# Patient Record
Sex: FEMALE | Race: WHITE | Employment: OTHER | URBAN - NONMETROPOLITAN AREA
[De-identification: names, ages, dates, MRNs, and addresses within clinical notes are randomized per-mention and may not be internally consistent; named-entity substitution may affect disease eponyms.]

---

## 2017-01-25 ENCOUNTER — TELEPHONE (OUTPATIENT)
Dept: CARDIOLOGY | Age: 82
End: 2017-01-25

## 2017-01-25 RX ORDER — IRBESARTAN 300 MG/1
300 TABLET ORAL NIGHTLY
Qty: 30 TABLET | Refills: 5 | Status: SHIPPED | OUTPATIENT
Start: 2017-01-25 | End: 2017-11-22 | Stop reason: ALTCHOICE

## 2017-02-07 ENCOUNTER — TELEPHONE (OUTPATIENT)
Dept: CARDIOLOGY | Age: 82
End: 2017-02-07

## 2017-02-16 RX ORDER — HYDROCHLOROTHIAZIDE 25 MG/1
TABLET ORAL
Qty: 30 TABLET | Refills: 3 | Status: ON HOLD | OUTPATIENT
Start: 2017-02-16 | End: 2018-02-13 | Stop reason: HOSPADM

## 2017-03-02 RX ORDER — ATENOLOL 25 MG/1
TABLET ORAL
Qty: 90 TABLET | Refills: 5 | Status: SHIPPED | OUTPATIENT
Start: 2017-03-02 | End: 2017-06-14 | Stop reason: ALTCHOICE

## 2017-05-11 ENCOUNTER — TELEPHONE (OUTPATIENT)
Dept: CARDIOLOGY | Age: 82
End: 2017-05-11

## 2017-05-11 ENCOUNTER — OFFICE VISIT (OUTPATIENT)
Dept: CARDIOLOGY | Age: 82
End: 2017-05-11
Payer: MEDICARE

## 2017-05-11 VITALS
WEIGHT: 180 LBS | DIASTOLIC BLOOD PRESSURE: 80 MMHG | BODY MASS INDEX: 26.66 KG/M2 | SYSTOLIC BLOOD PRESSURE: 138 MMHG | HEART RATE: 62 BPM | HEIGHT: 69 IN

## 2017-05-11 DIAGNOSIS — R06.02 SOB (SHORTNESS OF BREATH): ICD-10-CM

## 2017-05-11 DIAGNOSIS — R06.02 SOB (SHORTNESS OF BREATH): Primary | ICD-10-CM

## 2017-05-11 DIAGNOSIS — R94.2 ABNORMAL PFT: ICD-10-CM

## 2017-05-11 DIAGNOSIS — I35.1 MILD AORTIC INSUFFICIENCY: ICD-10-CM

## 2017-05-11 DIAGNOSIS — I10 ESSENTIAL HYPERTENSION: Primary | ICD-10-CM

## 2017-05-11 PROCEDURE — 4040F PNEUMOC VAC/ADMIN/RCVD: CPT | Performed by: NURSE PRACTITIONER

## 2017-05-11 PROCEDURE — 99213 OFFICE O/P EST LOW 20 MIN: CPT | Performed by: NURSE PRACTITIONER

## 2017-05-11 PROCEDURE — G8427 DOCREV CUR MEDS BY ELIG CLIN: HCPCS | Performed by: NURSE PRACTITIONER

## 2017-05-11 PROCEDURE — 1036F TOBACCO NON-USER: CPT | Performed by: NURSE PRACTITIONER

## 2017-05-11 PROCEDURE — 1123F ACP DISCUSS/DSCN MKR DOCD: CPT | Performed by: NURSE PRACTITIONER

## 2017-05-11 PROCEDURE — G8420 CALC BMI NORM PARAMETERS: HCPCS | Performed by: NURSE PRACTITIONER

## 2017-05-11 PROCEDURE — 1090F PRES/ABSN URINE INCON ASSESS: CPT | Performed by: NURSE PRACTITIONER

## 2017-05-11 PROCEDURE — G8399 PT W/DXA RESULTS DOCUMENT: HCPCS | Performed by: NURSE PRACTITIONER

## 2017-05-15 ENCOUNTER — TELEPHONE (OUTPATIENT)
Dept: CARDIOLOGY | Age: 82
End: 2017-05-15

## 2017-05-30 ENCOUNTER — HOSPITAL ENCOUNTER (EMERGENCY)
Age: 82
Discharge: LEFT BEFORE DISCHARGE | End: 2017-05-30
Payer: MEDICARE

## 2017-05-30 VITALS
DIASTOLIC BLOOD PRESSURE: 84 MMHG | OXYGEN SATURATION: 99 % | RESPIRATION RATE: 18 BRPM | TEMPERATURE: 98.4 F | HEART RATE: 80 BPM | SYSTOLIC BLOOD PRESSURE: 142 MMHG

## 2017-05-30 PROCEDURE — 4500000002 HC ER NO CHARGE

## 2017-06-14 ENCOUNTER — OFFICE VISIT (OUTPATIENT)
Dept: CARDIOLOGY | Age: 82
End: 2017-06-14
Payer: MEDICARE

## 2017-06-14 VITALS
HEIGHT: 69 IN | HEART RATE: 54 BPM | DIASTOLIC BLOOD PRESSURE: 92 MMHG | WEIGHT: 180 LBS | SYSTOLIC BLOOD PRESSURE: 150 MMHG | BODY MASS INDEX: 26.66 KG/M2

## 2017-06-14 DIAGNOSIS — I10 ESSENTIAL HYPERTENSION: Primary | ICD-10-CM

## 2017-06-14 PROCEDURE — 1090F PRES/ABSN URINE INCON ASSESS: CPT | Performed by: NURSE PRACTITIONER

## 2017-06-14 PROCEDURE — G8399 PT W/DXA RESULTS DOCUMENT: HCPCS | Performed by: NURSE PRACTITIONER

## 2017-06-14 PROCEDURE — 99213 OFFICE O/P EST LOW 20 MIN: CPT | Performed by: NURSE PRACTITIONER

## 2017-06-14 PROCEDURE — 1036F TOBACCO NON-USER: CPT | Performed by: NURSE PRACTITIONER

## 2017-06-14 PROCEDURE — G8427 DOCREV CUR MEDS BY ELIG CLIN: HCPCS | Performed by: NURSE PRACTITIONER

## 2017-06-14 PROCEDURE — 4040F PNEUMOC VAC/ADMIN/RCVD: CPT | Performed by: NURSE PRACTITIONER

## 2017-06-14 PROCEDURE — 93000 ELECTROCARDIOGRAM COMPLETE: CPT | Performed by: NURSE PRACTITIONER

## 2017-06-14 PROCEDURE — G8419 CALC BMI OUT NRM PARAM NOF/U: HCPCS | Performed by: NURSE PRACTITIONER

## 2017-06-14 PROCEDURE — 1123F ACP DISCUSS/DSCN MKR DOCD: CPT | Performed by: NURSE PRACTITIONER

## 2017-06-14 RX ORDER — CLONIDINE HYDROCHLORIDE 0.1 MG/1
0.1 TABLET ORAL 3 TIMES DAILY PRN
COMMUNITY
End: 2017-11-22 | Stop reason: ALTCHOICE

## 2017-06-14 RX ORDER — NEBIVOLOL 10 MG/1
10 TABLET ORAL 2 TIMES DAILY
Qty: 60 TABLET | Refills: 0
Start: 2017-06-14 | End: 2017-06-28 | Stop reason: SDUPTHER

## 2017-06-28 RX ORDER — NEBIVOLOL 10 MG/1
10 TABLET ORAL 2 TIMES DAILY
Qty: 4 TABLET | Refills: 0 | Status: ON HOLD | OUTPATIENT
Start: 2017-06-28 | End: 2018-02-13 | Stop reason: HOSPADM

## 2017-06-30 ENCOUNTER — TELEPHONE (OUTPATIENT)
Dept: CARDIOLOGY | Age: 82
End: 2017-06-30

## 2017-11-22 ENCOUNTER — OFFICE VISIT (OUTPATIENT)
Dept: CARDIOLOGY | Age: 82
End: 2017-11-22
Payer: MEDICARE

## 2017-11-22 VITALS
DIASTOLIC BLOOD PRESSURE: 70 MMHG | HEIGHT: 69 IN | WEIGHT: 178 LBS | SYSTOLIC BLOOD PRESSURE: 138 MMHG | BODY MASS INDEX: 26.36 KG/M2

## 2017-11-22 DIAGNOSIS — I10 ESSENTIAL HYPERTENSION: Primary | ICD-10-CM

## 2017-11-22 PROCEDURE — G8417 CALC BMI ABV UP PARAM F/U: HCPCS | Performed by: NURSE PRACTITIONER

## 2017-11-22 PROCEDURE — 99213 OFFICE O/P EST LOW 20 MIN: CPT | Performed by: NURSE PRACTITIONER

## 2017-11-22 PROCEDURE — 1123F ACP DISCUSS/DSCN MKR DOCD: CPT | Performed by: NURSE PRACTITIONER

## 2017-11-22 PROCEDURE — 1036F TOBACCO NON-USER: CPT | Performed by: NURSE PRACTITIONER

## 2017-11-22 PROCEDURE — G8484 FLU IMMUNIZE NO ADMIN: HCPCS | Performed by: NURSE PRACTITIONER

## 2017-11-22 PROCEDURE — 4040F PNEUMOC VAC/ADMIN/RCVD: CPT | Performed by: NURSE PRACTITIONER

## 2017-11-22 PROCEDURE — 1090F PRES/ABSN URINE INCON ASSESS: CPT | Performed by: NURSE PRACTITIONER

## 2017-11-22 PROCEDURE — G8427 DOCREV CUR MEDS BY ELIG CLIN: HCPCS | Performed by: NURSE PRACTITIONER

## 2017-11-22 PROCEDURE — G8399 PT W/DXA RESULTS DOCUMENT: HCPCS | Performed by: NURSE PRACTITIONER

## 2017-11-22 RX ORDER — AMLODIPINE BESYLATE 5 MG/1
5 TABLET ORAL DAILY
COMMUNITY
End: 2019-05-06 | Stop reason: DRUGHIGH

## 2017-11-22 NOTE — PROGRESS NOTES
[facility], [place]. [address], [address], [address] [facility]  [phone] office  [phone] fax      OFFICE VISIT:  11/22/2017    [address]: 3/26/1933    Reason For Visit:  Elaine Courtney is a 80 y.o. female who is here for 6 Month Follow-Up (Patient is here for a 6 month follow up, and recheck on Bystolic after starting on 06/14/2017) and Hypertension    The patient presents today for cardiology follow up. Overall, the patient is doing well from a cardiac standpoint without symptoms to suggest myocardial ischemia. BP is well controlled on current regimen. The patient's PCP monitors cholesterol. Francisca Wade denies exertional chest pain, shortness of breath, orthopnea, paroxysmal nocturnal dyspnea, syncope, presyncope, sustained arrythmia, edema and fatigue. The patient denies numbness or weakness to suggest cerebrovascular accident or transient ischemic attack.       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    Brain tumor ([address] D49.6    Irregular heart rate I49.9    Moderate mitral regurgitation I34.0    SOB (shortness of breath) R06.02    Pedal edema R60.0    Abnormal PFT R94.2    Essential hypertension I10    Mild aortic insufficiency I35.1     Past Medical History:   Diagnosis Date    Arthralgia     Brain tumor ([address]     meningioma removed 2009    COPD (chronic obstructive pulmonary disease) ([address]     Diverticulosis     Essential hypertension     Gout     Mild aortic regurgitation 01/04/2016    Moderate mitral regurgitation 3/13/2014    Polyarticular arthritis      Past Surgical History:   Procedure Laterality Date    BREAST BIOPSY  1992&2004    CATARACT REMOVAL      CHOLECYSTECTOMY  1996    CRANIOTOMY  2010    HYSTERECTOMY  1971    TUMOR REMOVAL  2009    brain tumor (meningioma)     Family History Problem Relation Age of Onset    Coronary Art Dis Father     Heart Attack Father     Cancer Sister     Cancer Brother     Heart Attack Mother     Stroke Mother      Social History   Substance Use Topics    Smoking status: Former Smoker     Packs/day: 1.00     Years: 8.00    Smokeless tobacco: Never Used      Comment: Quit 40+ years ago.  Alcohol use No      Comment: occasional      Current Outpatient Prescriptions   Medication Sig Dispense Refill    amLODIPine (NORVASC) 5 MG tablet Take 5 mg by mouth daily      nebivolol (BYSTOLIC) 10 MG tablet Take 1 tablet by mouth 2 times daily 4 tablet 0    hydrochlorothiazide (HYDRODIURIL) 25 MG tablet TAKE 1 TABLET BY MOUTH ONCE DAILY. 30 tablet 3    aspirin 81 MG tablet Take 81 mg by mouth daily      allopurinol (ZYLOPRIM) 100 MG tablet Take 100 mg by mouth daily. No current facility-administered medications for this visit. Allergies: Codeine and Sulfa antibiotics    Review of Systems  Constitutional  no appetite change, or unexpected weight change. No fever, chills or diaphoresis. No significant change in activity level or new onset of fatigue. HEENT  no significant rhinorrhea or epistaxis. No tinnitus or significant hearing loss. Eyes  no sudden vision change or amaurosis. No corneal arcus, xantholasma, subconjunctival hemorrhage or discharge. Respiratory  no significant wheezing, stridor, apnea or cough. No dyspnea on exertion or shortness of air. Cardiovascular  no exertional chest pain to suggest myocardial ischemia. No orthopnea or PND. No sensation of sustained arrythmia. No occurrence of slow heart rate. No palpitations. No claudication. No leg edema. Gastrointestinal  no abdominal swelling or pain. No blood in stool. No severe constipation, diarrhea, nausea, or vomiting. Genitourinary  no dysuria, frequency, or urgency. No flank pain or hematuria. Musculoskeletal  no back pain or myalgia.   No problems with

## 2017-11-22 NOTE — PATIENT INSTRUCTIONS
Continue current medications as prescribed. Continue to follow up with primary care provider for non cardiac medical problems. Call the office with any problems, questions or concerns at [phone]. Follow up as scheduled with your cardiologist.  The following educational material has been included in this after visit summary for your review: hypertension.     Additional instructions:  Coronary artery disease risk factors you can control: Smoking, high blood pressure, high cholesterol, diabetes, being overweight, lack of exercise and stress. Continue heart healthy diet. Take medications as directed. Exercise as tolerated. Strive for 15 minutes of exercise most days of the week. If asked to keep a blood pressure log, do so for 2 weeks. Call the office to report readings at [phone]. Blood pressure goal is 140/90 or less. If you are a diabetic, the goal is 130/80 or less. If you are taking cholesterol lowering medications, it is recommended that lab work be checked annually. Always keep a current medication list. Bring your medications to every office visit.        Patient Education        Learning About High Blood Pressure  What is high blood pressure? Blood pressure is a measure of how hard the blood pushes against the walls of your arteries. It's normal for blood pressure to go up and down throughout the day, but if it stays up, you have high blood pressure. Another name for high blood pressure is hypertension. Two numbers tell you your blood pressure. The first number is the systolic pressure. It shows how hard the blood pushes when your heart is pumping. The second number is the diastolic pressure. It shows how hard the blood pushes between heartbeats, when your heart is relaxed and filling with blood. A blood pressure of less than 120/80 (say \"120 over 80\") is ideal for an adult. High blood pressure is 140/90 or higher.  You have high blood pressure if your top number is 140 or higher or products. Eat less saturated and total fats. How is high blood pressure treated? · Your doctor will suggest making lifestyle changes. For example, your doctor may ask you to eat healthy foods, quit smoking, lose extra weight, and be more active. · If lifestyle changes don't help enough or your blood pressure is very high, you will have to take medicine every day. Follow-up care is a key part of your treatment and safety. Be sure to make and go to all appointments, and call your doctor if you are having problems. It's also a good idea to know your test results and keep a list of the medicines you take. Where can you learn more? Go to [email or web address] org and sign in to your [email or web address] account. Enter P501 in the [employer] box to learn more about \"Learning About High Blood Pressure. \"     If you do not have an account, please click on the \"Sign Up Now\" link. Current as of: March 23, 2016  Content Version: 11.3  © 0162-0473 [employer]. Care instructions adapted under license by [facility] ([facility]). If you have questions about a medical condition or this instruction, always ask your healthcare professional. [place] [address] any warranty or liability for your use of this information.

## 2018-02-10 ENCOUNTER — APPOINTMENT (OUTPATIENT)
Dept: GENERAL RADIOLOGY | Age: 83
DRG: 065 | End: 2018-02-10
Payer: MEDICARE

## 2018-02-10 ENCOUNTER — HOSPITAL ENCOUNTER (INPATIENT)
Age: 83
LOS: 3 days | Discharge: HOME | DRG: 065 | End: 2018-02-13
Attending: FAMILY MEDICINE | Admitting: INTERNAL MEDICINE
Payer: MEDICARE

## 2018-02-10 ENCOUNTER — APPOINTMENT (OUTPATIENT)
Dept: CT IMAGING | Age: 83
DRG: 065 | End: 2018-02-10
Payer: MEDICARE

## 2018-02-10 DIAGNOSIS — I63.9 STROKE WITH CEREBRAL ISCHEMIA (HCC): ICD-10-CM

## 2018-02-10 DIAGNOSIS — I10 ESSENTIAL HYPERTENSION: ICD-10-CM

## 2018-02-10 DIAGNOSIS — I63.9 CEREBROVASCULAR ACCIDENT (CVA), UNSPECIFIED MECHANISM (HCC): Primary | ICD-10-CM

## 2018-02-10 PROBLEM — I69.320 APHASIA DUE TO RECENT CEREBROVASCULAR ACCIDENT (CVA): Status: ACTIVE | Noted: 2018-02-10

## 2018-02-10 LAB
ALBUMIN SERPL-MCNC: 4.5 G/DL (ref 3.5–5.2)
ALP BLD-CCNC: 87 U/L (ref 35–104)
ALT SERPL-CCNC: 17 U/L (ref 5–33)
ANION GAP SERPL CALCULATED.3IONS-SCNC: 15 MMOL/L (ref 7–19)
AST SERPL-CCNC: 21 U/L (ref 5–32)
BACTERIA: ABNORMAL /HPF
BASOPHILS ABSOLUTE: 0.1 K/UL (ref 0–0.2)
BASOPHILS RELATIVE PERCENT: 0.7 % (ref 0–1)
BILIRUB SERPL-MCNC: 0.5 MG/DL (ref 0.2–1.2)
BILIRUBIN URINE: NEGATIVE
BLOOD, URINE: NEGATIVE
BUN BLDV-MCNC: 28 MG/DL (ref 8–23)
C-REACTIVE PROTEIN: 0.21 MG/DL (ref 0–0.5)
CALCIUM SERPL-MCNC: 9.3 MG/DL (ref 8.8–10.2)
CHLORIDE BLD-SCNC: 99 MMOL/L (ref 98–111)
CLARITY: ABNORMAL
CO2: 25 MMOL/L (ref 22–29)
COLOR: YELLOW
CREAT SERPL-MCNC: 0.9 MG/DL (ref 0.5–0.9)
EOSINOPHILS ABSOLUTE: 0.1 K/UL (ref 0–0.6)
EOSINOPHILS RELATIVE PERCENT: 1.2 % (ref 0–5)
EPITHELIAL CELLS, UA: 9 /HPF (ref 0–5)
GFR NON-AFRICAN AMERICAN: 60
GLUCOSE BLD-MCNC: 120 MG/DL (ref 74–109)
GLUCOSE URINE: NEGATIVE MG/DL
HCT VFR BLD CALC: 43.7 % (ref 37–47)
HEMOGLOBIN: 14.6 G/DL (ref 12–16)
HYALINE CASTS: 2 /HPF (ref 0–8)
KETONES, URINE: NEGATIVE MG/DL
LEUKOCYTE ESTERASE, URINE: ABNORMAL
LYMPHOCYTES ABSOLUTE: 1.1 K/UL (ref 1.1–4.5)
LYMPHOCYTES RELATIVE PERCENT: 14.9 % (ref 20–40)
MCH RBC QN AUTO: 30.8 PG (ref 27–31)
MCHC RBC AUTO-ENTMCNC: 33.4 G/DL (ref 33–37)
MCV RBC AUTO: 92.2 FL (ref 81–99)
MONOCYTES ABSOLUTE: 0.6 K/UL (ref 0–0.9)
MONOCYTES RELATIVE PERCENT: 7.7 % (ref 0–10)
NEUTROPHILS ABSOLUTE: 5.6 K/UL (ref 1.5–7.5)
NEUTROPHILS RELATIVE PERCENT: 75.1 % (ref 50–65)
NITRITE, URINE: POSITIVE
PDW BLD-RTO: 12.7 % (ref 11.5–14.5)
PERFORMED ON: NORMAL
PH UA: 5.5
PLATELET # BLD: 154 K/UL (ref 130–400)
PMV BLD AUTO: 9 FL (ref 9.4–12.3)
POC TROPONIN I: 0 NG/ML (ref 0–0.08)
POTASSIUM SERPL-SCNC: 3.9 MMOL/L (ref 3.5–5)
PRO-BNP: 571 PG/ML (ref 0–1800)
PROTEIN UA: NEGATIVE MG/DL
RBC # BLD: 4.74 M/UL (ref 4.2–5.4)
RBC UA: 3 /HPF (ref 0–4)
SODIUM BLD-SCNC: 139 MMOL/L (ref 136–145)
SPECIFIC GRAVITY UA: 1.01
TOTAL CK: 107 U/L (ref 26–192)
TOTAL PROTEIN: 7.3 G/DL (ref 6.6–8.7)
UROBILINOGEN, URINE: 0.2 E.U./DL
WBC # BLD: 7.5 K/UL (ref 4.8–10.8)
WBC UA: 9 /HPF (ref 0–5)

## 2018-02-10 PROCEDURE — 85025 COMPLETE CBC W/AUTO DIFF WBC: CPT

## 2018-02-10 PROCEDURE — 99285 EMERGENCY DEPT VISIT HI MDM: CPT

## 2018-02-10 PROCEDURE — 71045 X-RAY EXAM CHEST 1 VIEW: CPT

## 2018-02-10 PROCEDURE — 80053 COMPREHEN METABOLIC PANEL: CPT

## 2018-02-10 PROCEDURE — 2580000003 HC RX 258: Performed by: FAMILY MEDICINE

## 2018-02-10 PROCEDURE — 81001 URINALYSIS AUTO W/SCOPE: CPT

## 2018-02-10 PROCEDURE — 36415 COLL VENOUS BLD VENIPUNCTURE: CPT

## 2018-02-10 PROCEDURE — 70450 CT HEAD/BRAIN W/O DYE: CPT

## 2018-02-10 PROCEDURE — 93005 ELECTROCARDIOGRAM TRACING: CPT

## 2018-02-10 PROCEDURE — 83880 ASSAY OF NATRIURETIC PEPTIDE: CPT

## 2018-02-10 PROCEDURE — 1210000000 HC MED SURG R&B

## 2018-02-10 PROCEDURE — 86140 C-REACTIVE PROTEIN: CPT

## 2018-02-10 PROCEDURE — 84484 ASSAY OF TROPONIN QUANT: CPT

## 2018-02-10 PROCEDURE — 82550 ASSAY OF CK (CPK): CPT

## 2018-02-10 PROCEDURE — 6370000000 HC RX 637 (ALT 250 FOR IP): Performed by: FAMILY MEDICINE

## 2018-02-10 PROCEDURE — 99285 EMERGENCY DEPT VISIT HI MDM: CPT | Performed by: FAMILY MEDICINE

## 2018-02-10 RX ORDER — METOPROLOL TARTRATE 5 MG/5ML
5 INJECTION INTRAVENOUS ONCE
Status: DISCONTINUED | OUTPATIENT
Start: 2018-02-10 | End: 2018-02-13 | Stop reason: HOSPADM

## 2018-02-10 RX ORDER — AMLODIPINE BESYLATE 5 MG/1
5 TABLET ORAL ONCE
Status: COMPLETED | OUTPATIENT
Start: 2018-02-10 | End: 2018-02-10

## 2018-02-10 RX ORDER — SODIUM CHLORIDE 9 MG/ML
INJECTION, SOLUTION INTRAVENOUS CONTINUOUS
Status: DISCONTINUED | OUTPATIENT
Start: 2018-02-10 | End: 2018-02-11 | Stop reason: SDUPTHER

## 2018-02-10 RX ADMIN — SODIUM CHLORIDE: 9 INJECTION, SOLUTION INTRAVENOUS at 21:25

## 2018-02-10 RX ADMIN — AMLODIPINE BESYLATE 5 MG: 5 TABLET ORAL at 22:04

## 2018-02-10 ASSESSMENT — ENCOUNTER SYMPTOMS
SINUS PAIN: 0
SHORTNESS OF BREATH: 0
WHEEZING: 0
NAUSEA: 0
COUGH: 0
DIARRHEA: 0
VOMITING: 0
COLOR CHANGE: 0
CHEST TIGHTNESS: 0
ABDOMINAL PAIN: 0
BACK PAIN: 0
RHINORRHEA: 0
CONSTIPATION: 0

## 2018-02-11 ENCOUNTER — APPOINTMENT (OUTPATIENT)
Dept: MRI IMAGING | Age: 83
DRG: 065 | End: 2018-02-11
Payer: MEDICARE

## 2018-02-11 PROBLEM — N30.00 ACUTE CYSTITIS WITHOUT HEMATURIA: Status: ACTIVE | Noted: 2018-02-11

## 2018-02-11 PROBLEM — J44.9 COPD (CHRONIC OBSTRUCTIVE PULMONARY DISEASE) (HCC): Status: ACTIVE | Noted: 2018-02-11

## 2018-02-11 LAB
CHOLESTEROL, TOTAL: 201 MG/DL (ref 160–199)
HBA1C MFR BLD: 5.3 %
HCT VFR BLD CALC: 38.2 % (ref 37–47)
HDLC SERPL-MCNC: 49 MG/DL (ref 65–121)
HEMOGLOBIN: 12.7 G/DL (ref 12–16)
LDL CHOLESTEROL CALCULATED: 131 MG/DL
LV EF: 58 %
LVEF MODALITY: NORMAL
MCH RBC QN AUTO: 30.7 PG (ref 27–31)
MCHC RBC AUTO-ENTMCNC: 33.2 G/DL (ref 33–37)
MCV RBC AUTO: 92.3 FL (ref 81–99)
PDW BLD-RTO: 12.8 % (ref 11.5–14.5)
PLATELET # BLD: 140 K/UL (ref 130–400)
PMV BLD AUTO: 8.9 FL (ref 9.4–12.3)
RBC # BLD: 4.14 M/UL (ref 4.2–5.4)
TRIGL SERPL-MCNC: 105 MG/DL (ref 0–149)
TROPONIN: <0.01 NG/ML (ref 0–0.03)
TROPONIN: <0.01 NG/ML (ref 0–0.03)
WBC # BLD: 7.3 K/UL (ref 4.8–10.8)

## 2018-02-11 PROCEDURE — G8979 MOBILITY GOAL STATUS: HCPCS

## 2018-02-11 PROCEDURE — 36415 COLL VENOUS BLD VENIPUNCTURE: CPT

## 2018-02-11 PROCEDURE — 6360000004 HC RX CONTRAST MEDICATION: Performed by: INTERNAL MEDICINE

## 2018-02-11 PROCEDURE — 84484 ASSAY OF TROPONIN QUANT: CPT

## 2018-02-11 PROCEDURE — 1210000000 HC MED SURG R&B

## 2018-02-11 PROCEDURE — 99222 1ST HOSP IP/OBS MODERATE 55: CPT | Performed by: INTERNAL MEDICINE

## 2018-02-11 PROCEDURE — 6360000002 HC RX W HCPCS: Performed by: INTERNAL MEDICINE

## 2018-02-11 PROCEDURE — 99222 1ST HOSP IP/OBS MODERATE 55: CPT | Performed by: PSYCHIATRY & NEUROLOGY

## 2018-02-11 PROCEDURE — 2580000003 HC RX 258: Performed by: INTERNAL MEDICINE

## 2018-02-11 PROCEDURE — 97162 PT EVAL MOD COMPLEX 30 MIN: CPT

## 2018-02-11 PROCEDURE — 6370000000 HC RX 637 (ALT 250 FOR IP): Performed by: INTERNAL MEDICINE

## 2018-02-11 PROCEDURE — 93880 EXTRACRANIAL BILAT STUDY: CPT

## 2018-02-11 PROCEDURE — A9577 INJ MULTIHANCE: HCPCS | Performed by: INTERNAL MEDICINE

## 2018-02-11 PROCEDURE — 80061 LIPID PANEL: CPT

## 2018-02-11 PROCEDURE — 97750 PHYSICAL PERFORMANCE TEST: CPT

## 2018-02-11 PROCEDURE — 83036 HEMOGLOBIN GLYCOSYLATED A1C: CPT

## 2018-02-11 PROCEDURE — 97116 GAIT TRAINING THERAPY: CPT

## 2018-02-11 PROCEDURE — 93306 TTE W/DOPPLER COMPLETE: CPT

## 2018-02-11 PROCEDURE — 70553 MRI BRAIN STEM W/O & W/DYE: CPT

## 2018-02-11 PROCEDURE — G8978 MOBILITY CURRENT STATUS: HCPCS

## 2018-02-11 PROCEDURE — 85027 COMPLETE CBC AUTOMATED: CPT

## 2018-02-11 RX ORDER — BISACODYL 10 MG
10 SUPPOSITORY, RECTAL RECTAL DAILY PRN
Status: DISCONTINUED | OUTPATIENT
Start: 2018-02-11 | End: 2018-02-13 | Stop reason: HOSPADM

## 2018-02-11 RX ORDER — SODIUM CHLORIDE 9 MG/ML
INJECTION, SOLUTION INTRAVENOUS CONTINUOUS
Status: DISCONTINUED | OUTPATIENT
Start: 2018-02-11 | End: 2018-02-13 | Stop reason: HOSPADM

## 2018-02-11 RX ORDER — ACETAMINOPHEN 325 MG/1
650 TABLET ORAL EVERY 4 HOURS PRN
Status: DISCONTINUED | OUTPATIENT
Start: 2018-02-11 | End: 2018-02-13 | Stop reason: HOSPADM

## 2018-02-11 RX ORDER — ONDANSETRON 2 MG/ML
4 INJECTION INTRAMUSCULAR; INTRAVENOUS EVERY 6 HOURS PRN
Status: DISCONTINUED | OUTPATIENT
Start: 2018-02-11 | End: 2018-02-13 | Stop reason: HOSPADM

## 2018-02-11 RX ORDER — DOCUSATE SODIUM 100 MG/1
100 CAPSULE, LIQUID FILLED ORAL 2 TIMES DAILY
Status: DISCONTINUED | OUTPATIENT
Start: 2018-02-11 | End: 2018-02-13 | Stop reason: HOSPADM

## 2018-02-11 RX ORDER — ASPIRIN 325 MG
325 TABLET ORAL DAILY
Status: DISCONTINUED | OUTPATIENT
Start: 2018-02-11 | End: 2018-02-13 | Stop reason: HOSPADM

## 2018-02-11 RX ORDER — SODIUM CHLORIDE 0.9 % (FLUSH) 0.9 %
10 SYRINGE (ML) INJECTION PRN
Status: DISCONTINUED | OUTPATIENT
Start: 2018-02-11 | End: 2018-02-13 | Stop reason: HOSPADM

## 2018-02-11 RX ORDER — ALLOPURINOL 100 MG/1
100 TABLET ORAL DAILY
Status: DISCONTINUED | OUTPATIENT
Start: 2018-02-11 | End: 2018-02-13 | Stop reason: HOSPADM

## 2018-02-11 RX ORDER — ATORVASTATIN CALCIUM 40 MG/1
40 TABLET, FILM COATED ORAL NIGHTLY
Status: DISCONTINUED | OUTPATIENT
Start: 2018-02-11 | End: 2018-02-13 | Stop reason: HOSPADM

## 2018-02-11 RX ORDER — SODIUM CHLORIDE 0.9 % (FLUSH) 0.9 %
10 SYRINGE (ML) INJECTION EVERY 12 HOURS SCHEDULED
Status: DISCONTINUED | OUTPATIENT
Start: 2018-02-11 | End: 2018-02-13 | Stop reason: HOSPADM

## 2018-02-11 RX ADMIN — Medication 1 G: at 03:26

## 2018-02-11 RX ADMIN — ENOXAPARIN SODIUM 40 MG: 40 INJECTION SUBCUTANEOUS at 09:49

## 2018-02-11 RX ADMIN — ATORVASTATIN CALCIUM 40 MG: 40 TABLET, FILM COATED ORAL at 03:28

## 2018-02-11 RX ADMIN — DOCUSATE SODIUM 100 MG: 100 CAPSULE, LIQUID FILLED ORAL at 20:13

## 2018-02-11 RX ADMIN — DOCUSATE SODIUM 100 MG: 100 CAPSULE, LIQUID FILLED ORAL at 03:29

## 2018-02-11 RX ADMIN — ALLOPURINOL 100 MG: 100 TABLET ORAL at 09:49

## 2018-02-11 RX ADMIN — SODIUM CHLORIDE: 9 INJECTION, SOLUTION INTRAVENOUS at 01:03

## 2018-02-11 RX ADMIN — Medication 10 ML: at 09:49

## 2018-02-11 RX ADMIN — ATORVASTATIN CALCIUM 40 MG: 40 TABLET, FILM COATED ORAL at 20:13

## 2018-02-11 RX ADMIN — Medication 10 ML: at 20:13

## 2018-02-11 RX ADMIN — GADOBENATE DIMEGLUMINE 17 ML: 529 INJECTION, SOLUTION INTRAVENOUS at 12:32

## 2018-02-11 RX ADMIN — DOCUSATE SODIUM 100 MG: 100 CAPSULE, LIQUID FILLED ORAL at 09:49

## 2018-02-11 RX ADMIN — ASPIRIN 325 MG ORAL TABLET 325 MG: 325 PILL ORAL at 09:48

## 2018-02-11 NOTE — ED PROVIDER NOTES
PM              LABS:  Labs Reviewed   COMPREHENSIVE METABOLIC PANEL - Abnormal; Notable for the following:        Result Value    Glucose 120 (*)     BUN 28 (*)     GFR Non- 60 (*)     All other components within normal limits   URINALYSIS - Abnormal; Notable for the following:     Clarity, UA CLOUDY (*)     Nitrite, Urine POSITIVE (*)     Leukocyte Esterase, Urine SMALL (*)     All other components within normal limits   CBC WITH AUTO DIFFERENTIAL - Abnormal; Notable for the following:     MPV 9.0 (*)     Neutrophils % 75.1 (*)     Lymphocytes % 14.9 (*)     All other components within normal limits   MICROSCOPIC URINALYSIS - Abnormal; Notable for the following:     WBC, UA 9 (*)     All other components within normal limits   BRAIN NATRIURETIC PEPTIDE   CK   C-REACTIVE PROTEIN   POCT VENOUS       All other labs were within normal range or not returned as of this dictation. EMERGENCY DEPARTMENT COURSE and DIFFERENTIAL DIAGNOSIS/MDM:   Vitals:    Vitals:    02/10/18 2130 02/10/18 2202 02/10/18 2204 02/10/18 2232   BP: (!) 179/80 (!) 176/74 (!) 176/74 (!) 160/86   Pulse: 67 68  65   Resp:       Temp:       TempSrc:       SpO2: 92% 93%  96%   Weight:           MDM    Reassessment      CONSULTS:  IP CONSULT TO NEUROLOGY    PROCEDURES:  Unless otherwise noted below, none     Procedures    FINAL IMPRESSION      1. Cerebrovascular accident (CVA), unspecified mechanism ([address]    2.  Essential hypertension          DISPOSITION/PLAN   DISPOSITION Admitted 02/10/2018 10:39:00 PM      PATIENT REFERRED TO:  Dulce Holland MD  [address] [phone]            DISCHARGE MEDICATIONS:  New Prescriptions    No medications on file          (Please note that portions of this note were completed with a voice recognition program.  Efforts were made to edit the dictations but occasionally words are mis-transcribed.)    Shanelle Quispe MD (electronically signed)  Attending Emergency Physician        Phuc Babb MD  02/10/18 9855

## 2018-02-11 NOTE — ED NOTES
Pt daughters state that pt has been complaining for the past week about sharp pains in her head intermittently and that pt has hx of brain surgery.       Fco Sanches RN  02/10/18 2022

## 2018-02-11 NOTE — PROGRESS NOTES
Alone  Type of Home: House  Home Layout: One level  Home Access: Stairs to enter without rails  Entrance Stairs - Number of Steps: 2  Home Equipment:  (none)  Receives Help From: Family  ADL Assistance: Independent (does bathing and dressing indep)  Homemaking Assistance: Independent (does all her own cooking and cleaning)  Homemaking Responsibilities: Yes  Ambulation Assistance: Independent  Transfer Assistance: Independent  Occupation: Retired  Additional Comments: pt. with fall at home  Objective     Observation/Palpation  Posture: Fair  Observation: pt. with R sided lean    AROM RLE (degrees)  RLE AROM: WFL  AROM LLE (degrees)  LLE AROM : WFL  Strength RLE  Comment: grossly 3+/5  Strength LLE  Strength LLE: WFL  Comment: grossly 4+/5        Bed mobility  Supine to Sit: Contact guard assistance  Sit to Supine: Contact guard assistance  Comment: pt. sat on EOB x 5 mins prior to standing  Transfers  Sit to Stand: Minimal Assistance (R sided lean)  Stand to sit: Minimal Assistance  Ambulation  Ambulation?: Yes  WB Status: no restrictions  Ambulation 1  Surface: level tile  Device: Hand-Held Assist  Assistance: Minimal assistance  Quality of Gait: unsteady, narrow RED  Distance: 40'  Comments: pt. with R sided lean/LOB, holding onto railing in hallway on L, R knee buckling occasionally     Balance  Posture: Fair  Sitting - Static: Good;+  Sitting - Dynamic: Good  Standing - Static: Fair;+  Standing - Dynamic: Fair  Exercises  Comments: AP x 20, LAQ x 10 EOB     Assessment   Body structures, Functions, Activity limitations: Decreased functional mobility ; Decreased strength;Decreased safe awareness;Decreased cognition;Decreased endurance;Decreased balance;Decreased coordination  Assessment: Pt. will beenfit from skilled PT to decrease impairments. Pt. a fall risk with to decreased strength RLE, balance and safety awareness and should not attempt mobility on her own at this time. Pt. would benefit from use of RW.

## 2018-02-11 NOTE — ED NOTES
Jacobo Martins at [phone].     Dr. Renny Paniagua called back at [place] [id number]  02/10/18 6680

## 2018-02-11 NOTE — CONSULTS
Consult to Neurology  Consult performed by: [facility]  Consult ordered by: Gurpreet Cloud Neurology Consult      Patient:   Tanisha Arroyo  MR#:    [id number]  Account Number:                   [id number]      Room:    [address] [place] [address]   YOB: 1933  Date of Progress Note: 2/11/2018  Time of Note                           10:53 AM  Attending Physician:  Leopold Griffes, MD  Consulting Physician:  Justa Posadas D.O.       CHIEF COMPLAINT:  Speech difficulty, facial drooping    HISTORY OF PRESENT ILLNESS:   This is a 80 y.o. female who was admitted with worsening speech difficulty and left facial drooping. Time of symptom onset could not be readily established and she was not felt to be a thrombotic candidate emergency department. She has a prior history of a meningioma and status post craniotomy. She has no previous stroke history. No history of atrial fibrillation or carotid disease. She is noted difficulty with her speech. Some questionable dysarthria. Son noted left facial drooping. They deny any focal weakness in the upper or lower extremities. She currently denies headaches or visual changes. CT negative for anything acute. Postop changes were noted. Basic labs were largely unrevealing outside of an underlying urinary tract infection.     REVIEW OF SYSTEMS:  Limited given speech difficulty    PAST MEDICAL HISTORY:      Diagnosis Date    Arthralgia     Brain tumor [facility])     meningioma removed 2009    [facility] ([facility] disease) [address]     Diverticulosis     Essential hypertension     Gout     Mild aortic regurgitation 01/04/2016    Moderate mitral regurgitation 3/13/2014    Polyarticular arthritis        PAST SURGICAL HISTORY:      Procedure Laterality Date    BREAST BIOPSY  1992&2004    CATARACT REMOVAL      CHOLECYSTECTOMY  1996    CRANIOTOMY  2010    HYSTERECTOMY  1971    TUMOR REMOVAL  2009    brain tumor (meningioma)       SOCIAL HISTORY: TOBACCO:   reports that she has quit smoking. She has a 8.00 pack-year smoking history. She has never used smokeless tobacco.  ETOH:   reports that she does not drink alcohol.   DRUG:    History   Drug Use No       FAMILY HISTORY:       Problem Relation Age of Onset    Coronary Art Dis Father     Heart Attack Father     Cancer Sister     Cancer Brother     Heart Attack Mother     Stroke Mother        MEDICATIONS:      Current Facility-Administered Medications:     0.9 % sodium chloride infusion, , Intravenous, Continuous, Navneet De La Torre MD, Last Rate: 75 mL/hr at 02/11/18 0103    sodium chloride flush 0.9 % injection 10 mL, 10 mL, Intravenous, 2 times per day, Navneet De La Torre MD, 10 mL at 02/11/18 0949    sodium chloride flush 0.9 % injection 10 mL, 10 mL, Intravenous, PRN, Navneet De La Torre MD    acetaminophen (TYLENOL) tablet 650 mg, 650 mg, Oral, Q4H PRN, Melissa Crouch MD    docusate sodium (COLACE) capsule 100 mg, 100 mg, Oral, BID, Navneet De La Torre MD, 100 mg at 02/11/18 0949    bisacodyl (DULCOLAX) suppository 10 mg, 10 mg, Rectal, Daily PRN, Navneet De La Torre MD    ondansetron (ZOFRAN) injection 4 mg, 4 mg, Intravenous, Q6H PRN, Navneet De La Torre MD    enoxaparin (LOVENOX) injection 40 mg, 40 mg, Subcutaneous, Daily, Vergie Prudegildardo Crouch MD, 40 mg at 02/11/18 0949    aspirin tablet 325 mg, 325 mg, Oral, Daily, Navneet De La Torre MD, 325 mg at 02/11/18 0948    atorvastatin (LIPITOR) tablet 40 mg, 40 mg, Oral, Nightly, Navneet De La Torre MD, 40 mg at 02/11/18 0328    cefTRIAXone (ROCEPHIN) 1 g in sodium chloride (PF) 10 mL IV syringe, 1 g, Intravenous, Q24H, Navneet De La Torre MD, Last Rate: 0 mL/hr at 02/11/18 0326, 1 g at 02/11/18 0326    allopurinol (ZYLOPRIM) tablet 100 mg, 100 mg, Oral, Daily, Bay FANTASMA Crouch MD, 100 mg at 02/11/18 0949    metoprolol (LOPRESSOR) injection 5 mg, 5 mg, Intravenous, Once, Colby Oliva MD    ALLERGIES:    Codeine and Sulfa antibiotics    PHYSICAL EXAM:    Vitals: 2/10/2018  CT BRAIN without contrast 2/10/2018 7:45 PM HISTORY: Stroke COMPARISON: December 26, 2015 DLP: 941 mGy cm TECHNIQUE: Serial axial tomographic images of the brain were obtained without the use of intravenous contrast. FINDINGS: The midline structures are nondisplaced. There is mild cerebral and cerebellar volume loss. Postsurgical change from right frontal craniotomy and right frontal encephalomalacia is noted. This is stable and unchanged . The basilar cisterns are normal in size and configuration. There is no evidence of intracranial hemorrhage or mass-effect. There is low attenuation in the periventricular white matter, consistent with chronic ischemic change. There are no abnormal extra-axial fluid collections. There is no evidence of tonsillar herniation. The included orbits and their contents are unremarkable. The visualized paranasal sinuses, mastoid air cells and middle ear cavities are clear. Corene Loupe Postsurgical change from right frontal craniotomy. 2. Changes of aging. 3. No acute intracranial abnormality. Signed by Dr Naya Jama on 2/10/2018 8:54 PM    Xr Chest Portable    Result Date: 2/10/2018  XR CHEST PORTABLE 2/10/2018 7:45 PM HISTORY: Short of air COMPARISON: August 28, 2016. FINDINGS: The lungs are clear. The cardiomediastinal silhouette and pulmonary vascularity are within normal limits. The aorta is ectatic and tortuous The osseous structures and surrounding soft tissues demonstrate no acute abnormality. 1. No radiographic evidence of acute cardiopulmonary process.   Signed by Dr Naya Jama on 2/10/2018 8:45 PM      Recent Labs      02/10/18   2055  02/11/18   0625   WBC  7.5  7.3   HGB  14.6  12.7   PLT  154  140     Recent Labs      02/10/18   2055   NA  139   K  3.9   CL  99   CO2  25   BUN  28*   CREATININE  0.9   GLUCOSE  120*     Recent Labs      02/10/18   2055   AST  21   ALT  17   BILITOT  0.5   ALKPHOS  87     Recent Labs      02/11/18   0625   CHOL  201*   HDL  49* ASSESSMENT:  80 y.o. admitted with aphasia and facial drooping, suspect underlying stroke. Prior left frontal meningioma history as well, s/p craniotomy. UTI. PLAN:  1. MRI Brain  2. ECHO, CD, Tele  3. ASA, statin, DVT proph  4.  Permissive hypertension  5.  PT, OT, ST  6. Rocephin for UTI    Please feel free to call with any questions. [phone] (cell phone).     Frederick Rosenbaum DO  Board Certified Neurology

## 2018-02-11 NOTE — H&P
Hospital Medicine    History & Physical      CC: aphasia, fall, confusion    History Obtained From:  patient, electronic medical record    HISTORY OF PRESENT ILLNESS:    The patient is a 80 y.o. female who presents to ed with history of while she was eating they noticed she was more quiet than usual, she fell and could not speak, they noticed left facial droop, without weakness. Ct negative , and symptoms gradually improved. Neurology was called and instruction were followed. UA + for uti as well. Past Medical History:        Diagnosis Date    Arthralgia     [facility])     meningioma removed 2009    COPD (chronic obstructive [facility] disease) [facility]     Diverticulosis     Essential hypertension     Gout     Mild aortic regurgitation 01/04/2016    Moderate mitral regurgitation 3/13/2014    Polyarticular arthritis        Past Surgical History:        Procedure Laterality Date    BREAST BIOPSY  1992&2004    CATARACT REMOVAL      CHOLECYSTECTOMY  1996    CRANIOTOMY  2010    HYSTERECTOMY  1971    TUMOR REMOVAL  2009    brain tumor (meningioma)       Medications Prior to Admission:    Prescriptions Prior to Admission: amLODIPine (NORVASC) 5 MG tablet, Take 5 mg by mouth daily  nebivolol (BYSTOLIC) 10 MG tablet, Take 1 tablet by mouth 2 times daily  hydrochlorothiazide (HYDRODIURIL) 25 MG tablet, TAKE 1 TABLET BY MOUTH ONCE DAILY. aspirin 81 MG tablet, Take 81 mg by mouth daily  allopurinol (ZYLOPRIM) 100 MG tablet, Take 100 mg by mouth daily. Allergies:  Codeine and Sulfa antibiotics    Social History:   TOBACCO:   reports that she has quit smoking. She has a 8.00 pack-year smoking history. She has never used smokeless tobacco.  ETOH:   reports that she does not drink alcohol.   Patient currently lives with family     Family History:       Problem Relation Age of Onset    Coronary Art Dis Father     Heart Attack Father     Cancer Sister     Cancer Brother     Heart Attack Mother     Stroke Mother        I have personally reviewed above histories  REVIEW OF SYSTEMS:  Review of Systems   Unable to perform ROS: Mental acuity     PHYSICAL EXAM:  BP (!) 188/86   Pulse 74   Temp 98.5 °F (36.9 °C) (Temporal)   Resp 16   Ht 5' 5\" (1.651 m)   Wt 169 lb (76.7 kg)   SpO2 96%   BMI 28.12 kg/m²   Physical Exam   Constitutional: She appears well-developed and well-nourished. Non-toxic appearance. She does not have a sickly appearance. She does not appear ill. No distress. HENT:   Head: Normocephalic and atraumatic. Eyes: Conjunctivae, EOM and lids are normal. Pupils are equal, round, and reactive to light. Neck: Neck supple. No JVD present. Carotid bruit is not present. Cardiovascular: Normal rate and regular rhythm. Pulses:       Carotid pulses are 2+ on the right side, and 2+ on the left side. Radial pulses are 2+ on the right side, and 2+ on the left side. Pulmonary/Chest: She has no wheezes. She has no rhonchi. She has no rales. Abdominal: Soft. Bowel sounds are normal. There is no hepatosplenomegaly. There is no tenderness. Musculoskeletal:        Right lower leg: She exhibits no swelling and no edema. Left lower leg: She exhibits no swelling and no edema. Neurological: She is alert. She has normal strength. No cranial nerve deficit or sensory deficit. GCS eye subscore is 4. GCS verbal subscore is 5. GCS motor subscore is 6. Skin: Skin is warm and dry. Psychiatric: Her behavior is normal. Her speech is delayed. DATA:  EKG:  I have reviewed EKG with the following interpretation:  Imaging:    CT Head WO Contrast   Final Result   Postsurgical change from right frontal craniotomy. 2. Changes of aging. 3. No acute intracranial abnormality. Signed by Dr Jazmin Ibanez on 2/10/2018 8:54 PM      XR CHEST PORTABLE   Final Result   1. No radiographic evidence of acute cardiopulmonary process.        Signed by Dr Jazmin Ibanez on 2/10/2018 8:45 PM

## 2018-02-12 LAB
ANION GAP SERPL CALCULATED.3IONS-SCNC: 14 MMOL/L (ref 7–19)
BUN BLDV-MCNC: 19 MG/DL (ref 8–23)
CALCIUM SERPL-MCNC: 8.7 MG/DL (ref 8.8–10.2)
CHLORIDE BLD-SCNC: 104 MMOL/L (ref 98–111)
CO2: 25 MMOL/L (ref 22–29)
CREAT SERPL-MCNC: 0.7 MG/DL (ref 0.5–0.9)
GFR NON-AFRICAN AMERICAN: >60
GLUCOSE BLD-MCNC: 89 MG/DL (ref 74–109)
POTASSIUM SERPL-SCNC: 3.6 MMOL/L (ref 3.5–5)
SODIUM BLD-SCNC: 143 MMOL/L (ref 136–145)
TSH SERPL DL<=0.05 MIU/L-ACNC: 3.59 UIU/ML (ref 0.27–4.2)

## 2018-02-12 PROCEDURE — G8996 SWALLOW CURRENT STATUS: HCPCS

## 2018-02-12 PROCEDURE — 92610 EVALUATE SWALLOWING FUNCTION: CPT

## 2018-02-12 PROCEDURE — 6370000000 HC RX 637 (ALT 250 FOR IP): Performed by: INTERNAL MEDICINE

## 2018-02-12 PROCEDURE — G8987 SELF CARE CURRENT STATUS: HCPCS

## 2018-02-12 PROCEDURE — 99232 SBSQ HOSP IP/OBS MODERATE 35: CPT | Performed by: HOSPITALIST

## 2018-02-12 PROCEDURE — G8997 SWALLOW GOAL STATUS: HCPCS

## 2018-02-12 PROCEDURE — 84443 ASSAY THYROID STIM HORMONE: CPT

## 2018-02-12 PROCEDURE — G8998 SWALLOW D/C STATUS: HCPCS

## 2018-02-12 PROCEDURE — 92523 SPEECH SOUND LANG COMPREHEN: CPT

## 2018-02-12 PROCEDURE — 36415 COLL VENOUS BLD VENIPUNCTURE: CPT

## 2018-02-12 PROCEDURE — 6360000002 HC RX W HCPCS: Performed by: INTERNAL MEDICINE

## 2018-02-12 PROCEDURE — 99232 SBSQ HOSP IP/OBS MODERATE 35: CPT | Performed by: PSYCHIATRY & NEUROLOGY

## 2018-02-12 PROCEDURE — G8988 SELF CARE GOAL STATUS: HCPCS

## 2018-02-12 PROCEDURE — 1210000000 HC MED SURG R&B

## 2018-02-12 PROCEDURE — 97116 GAIT TRAINING THERAPY: CPT

## 2018-02-12 PROCEDURE — 80048 BASIC METABOLIC PNL TOTAL CA: CPT

## 2018-02-12 PROCEDURE — G8989 SELF CARE D/C STATUS: HCPCS

## 2018-02-12 PROCEDURE — 97165 OT EVAL LOW COMPLEX 30 MIN: CPT

## 2018-02-12 PROCEDURE — 87086 URINE CULTURE/COLONY COUNT: CPT

## 2018-02-12 PROCEDURE — 2580000003 HC RX 258: Performed by: INTERNAL MEDICINE

## 2018-02-12 RX ADMIN — DOCUSATE SODIUM 100 MG: 100 CAPSULE, LIQUID FILLED ORAL at 19:51

## 2018-02-12 RX ADMIN — ATORVASTATIN CALCIUM 40 MG: 40 TABLET, FILM COATED ORAL at 19:50

## 2018-02-12 RX ADMIN — ASPIRIN 325 MG ORAL TABLET 325 MG: 325 PILL ORAL at 08:26

## 2018-02-12 RX ADMIN — ALLOPURINOL 100 MG: 100 TABLET ORAL at 08:26

## 2018-02-12 RX ADMIN — Medication 1 G: at 02:02

## 2018-02-12 RX ADMIN — ENOXAPARIN SODIUM 40 MG: 40 INJECTION SUBCUTANEOUS at 08:26

## 2018-02-12 RX ADMIN — SODIUM CHLORIDE: 9 INJECTION, SOLUTION INTRAVENOUS at 08:49

## 2018-02-12 RX ADMIN — DOCUSATE SODIUM 100 MG: 100 CAPSULE, LIQUID FILLED ORAL at 08:26

## 2018-02-12 NOTE — PROGRESS NOTES
length. Pharyngeal Phase  Pharyngeal Phase: WFL  Pharyngeal: Patient exhibited functional laryngeal elevation during swallow initiation with no outward S/S penetration/aspiration noted with any regular solid consistency trial or thin H2O trial presented during evaluation. At this time, recommend continuation current diet. Self-feed. Will follow but for cognitive-linguistic impairments. G-Code  SLP G-Codes  Functional Limitations: Swallowing  Swallow Current Status (): At least 1 percent but less than 20 percent impaired, limited or restricted  Swallow Goal Status (): At least 1 percent but less than 20 percent impaired, limited or restricted  Swallow Discharge Status ():  At least 1 percent but less than 20 percent impaired, limited or restricted    Electronically signed by TIFFANI Diaz on 2/12/2018 at 10:23 AM

## 2018-02-12 NOTE — PROGRESS NOTES
Hospitalist Progress Note  2/12/2018 3:18 PM  Subjective:   Admit Date: 2/10/2018  PCP: Ken Araujo MD    Chief Complaint: Feeling good, question of stroke due to altered speech - much improved    Subjective: Feeling better, stronger, more enthusiastic and she's been working on her speech -- it's better, much better. She still has rare paraphasic errors with \"baby buggy\" but otherwise does very well. No HA, no other complaints.     Interval History:  80 y.o. female admitted 2/10/18 via [facility] after an episode of alteratoin while she was eating  -  family noticed she was more quiet than usual, she fell and could not speak, they noticed left facial droop, without weakness. Ct negative , and symptoms gradually improved. Neurology was called and instruction were followed. UA + for uti as well. On admission evaluation she had 7 visitors, I maked 8, she feels  and fine, denies any problems at this time. She does not notice any residuals from the spell of L facial droop, transient aphasia. She is very engaging and interactive. She cannot do \"peter piper, baby buggy bumpers\" - struggled with \"seashells, [employer] artillery\" and did fairly well with \"red leather\" but had trouble with \"th\" - she was surprised we discovered these deficiencies, she'd been talking around them. ROS: 14 point review of systems is negative except as specifically addressed above. DIET GENERAL;     Intake/Output Summary (Last 24 hours) at 02/12/18 1518  Last data filed at 02/12/18 1441   Gross per 24 hour   Intake             2916 ml   Output              250 ml   Net             2666 ml     Medications:   sodium chloride 75 mL/hr at 02/12/18 0849     Current Facility-Administered Medications   Medication Dose Route Frequency Provider Last Rate Last Dose    0.9 % sodium chloride infusion   Intravenous Continuous Bay Crouch MD 75 mL/hr at 02/12/18 0849      sodium chloride flush 0.9 % injection 10 mL  10 mL Intravenous 2 TRIG 105 02/11/2018    HDL 49 02/11/2018    LDLCALC 131 02/11/2018     TSH:    Lab Results   Component Value Date    TSH 3.590 02/12/2018    (pending)    Troponin T:   Recent Labs      02/10/18   2043  02/11/18   0016  02/11/18   0625   TROPONINI  0.00  <0.01  <0.01     INR: No results for input(s): INR in the last 72 hours. UA 2/10/18 - 1.011, cloudy, (+) nitrite, LES    CT Head 2/10/18  Impression  Postsurgical change from right frontal craniotomy. 2. Changes of aging. 3. No acute intracranial abnormality. Signed by Dr Christian Sainz on 2/10/2018 8:54 PM    MRI 2/11/18  Impression:   1. Left MCA territory acute ischemia along the insular cortex and left frontal lobe base. Basal ganglia is spared. No significant mass effect. No associated hemorrhage. 2. Large flow voids appear intact. 3. Right frontal lobe encephalomalacia and gliosis related to prior meningioma. Signed by Dr Mart Romero on 2/11/2018 12:53 PM    NICS 2/11/18  Impression   There is smooth plaque visualized in the bilateral internal carotid artery(ies).   There is no stenosis in the right internal carotid artery.   There is no stenosis of the left internal carotid artery.   There is normal antegrade flow in the bilateral vertebral artery(ies).    Electronically signed by Maria M Hood MD ([facility]) on 02/12/2018 06:22 AM        Objective:   Vitals: BP (!) 151/77   Pulse 62   Temp 97 °F (36.1 °C) (Temporal)   Resp 16   Ht 5' 5\" (1.651 m)   Wt 169 lb (76.7 kg)   SpO2 93%   BMI 28.12 kg/m²   24HR INTAKE/OUTPUT:      Intake/Output Summary (Last 24 hours) at 02/12/18 1518  Last data filed at 02/12/18 1441   Gross per 24 hour   Intake             2916 ml   Output              250 ml   Net             2666 ml     General appearance: alert and cooperative with exam  HEENT: atraumatic, eyes with clear conjunctiva and normal lids, pupils and irises normal, external ears and nose are normal, lips normal.

## 2018-02-12 NOTE — PROGRESS NOTES
present  [x] No rigidity or bradykinesia noted  COMMENTS:   Sensory  [x] Sensation intact to light touch, pin prick, vibration, and proprioception BLE  [] Sensation intact to light touch, pin prick, vibration, and proprioception BUE  COMMENTS:   Coordination [x] FTN normal bilaterally   [] HTS normal bilaterally  [] CLARIBEL normal.   COMMENTS:   Reflexes  [x] Symmetric and non-pathological  [x] Toes downgoing bilaterally  [x] No clonus present  COMMENTS:   Gait                  [] Normal steady gait    [] Ataxic    [] Spastic     [] Magnetic     [] Shuffling  [x] Not assessed  COMMENTS:        LABS/IMAGING:    Ct Head Wo Contrast    Result Date: 2/10/2018  CT BRAIN without contrast 2/10/2018 7:45 PM HISTORY: Stroke COMPARISON: December 26, 2015 DLP: 941 mGy cm TECHNIQUE: Serial axial tomographic images of the brain were obtained without the use of intravenous contrast. FINDINGS: The midline structures are nondisplaced. There is mild cerebral and cerebellar volume loss. Postsurgical change from right frontal craniotomy and right frontal encephalomalacia is noted. This is stable and unchanged . The basilar cisterns are normal in size and configuration. There is no evidence of intracranial hemorrhage or mass-effect. There is low attenuation in the periventricular white matter, consistent with chronic ischemic change. There are no abnormal extra-axial fluid collections. There is no evidence of tonsillar herniation. The included orbits and their contents are unremarkable. The visualized paranasal sinuses, mastoid air cells and middle ear cavities are clear. Brittany Aaron Postsurgical change from right frontal craniotomy. 2. Changes of aging. 3. No acute intracranial abnormality. Signed by Dr Eleonora Mccain on 2/10/2018 8:54 PM    Xr Chest Portable    Result Date: 2/10/2018  XR CHEST PORTABLE 2/10/2018 7:45 PM HISTORY: Short of air COMPARISON: August 28, 2016. FINDINGS: The lungs are clear.  The cardiomediastinal silhouette and pulmonary vascularity are within normal limits. The aorta is ectatic and tortuous The osseous structures and surrounding soft tissues demonstrate no acute abnormality. 1. No radiographic evidence of acute cardiopulmonary process. Signed by Dr Layla Ramírez on 2/10/2018 8:45 PM    Vascular Carotid Duplex Bilateral    Result Date: 2/12/2018  Vascular Carotid Procedure  Demographics   Patient Name    Ni Concepcion Age                   80   Patient Number  [id number]        Gender                Female   Visit Number    [id number]     Interpreting          Orlando Villela MD                                Physician   Date of Birth   03/26/1933    Referring Physician   Romana Sauce MD   Accession       [id number]     [facility]  Number  Procedure Type of Study:   Cerebral:Carotid, VL CAROTID BILATERAL. Indications for Study:CVA. Risk Factors   - The patient's risk factor(s) include: arterial hypertension. Impression   There is smooth plaque visualized in the bilateral internal carotid  artery(ies). There is no stenosis in the right internal carotid artery. There is no stenosis of the left internal carotid artery. There is normal antegrade flow in the bilateral vertebral artery(ies). Signature   ----------------------------------------------------------------  Electronically signed by Orlando Villela MD(Interpreting  physician) on 02/12/2018 06:22 AM  ----------------------------------------------------------------  Blood Pressure:Right arm 140/80 mmHg. Left arm 142/80 mmHg. Velocities are measured in cm/s ; Diameters are measured in mm Carotid Right Measurements +------------+-------+-------+--------+-------+------------+---------------+ ! Location    ! PSV    ! EDV    ! Angle   ! RI     !%Stenosis   ! Tortuosity     ! +------------+-------+-------+--------+-------+------------+---------------+ ! Prox CCA    !59.8   !9.38   !60      !0.84   !            !               ! +------------+-------+-------+--------+-------+------------+---------------+ ! Dist ICA    !41     !14.7   !60      !0.64   !            !               ! +------------+-------+-------+--------+-------+------------+---------------+ ! Prox ECA    !80.9   !       !60      !       !            !               ! +------------+-------+-------+--------+-------+------------+---------------+ ! Vertebral   !43.4   !15.8   !60      !0.64   !            !               ! +------------+-------+-------+--------+-------+------------+---------------+   - There is antegrade vertebral flow noted on the left side. - Additional Measurements:ICAPSV/CCAPSV 0.61. ICAEDV/CCAEDV 1.29. Mri Brain W Wo Contrast    Result Date: 2/11/2018  MRI BRAIN W WO CONTRAST 2/11/2018 11:30 AM HISTORY: Speech difficulty, suspected stroke Comparison: CT scan dated 2/10/2018  Technique: Multiplanar imaging of the brain was performed in a routine fashion before and after the intravenous injection of gadolinium contrast. Findings: Foci of ischemia are seen throughout the left insular cortex within the left MCA vascular territory. There is associated FLAIR hyperintensity reflecting a late acute time course for the ischemia. The basal ganglia is spared. No hemorrhagic transformation or petechial hemorrhage. Encephalomalacia in the right frontal lobe with surrounding gliosis reflecting changes from previously resected meningioma. Overlying craniotomy changes also identified. No intra-axial or extra-axial hemorrhage. Pachymeningeal enhancement along the right frontal convexity is not unexpected in the postoperative setting. Otherwise, no suspicious enhancement identified. Large intracranial flow voids are preserved. Normal appearing flow voids along the left MCA branching with what appears to be normal enhancing MCA branches on the postcontrast images. Chronic appearing paranasal sinus disease. Sella is unremarkable for age.  Posterior fossa structures are

## 2018-02-12 NOTE — PROGRESS NOTES
Occupational Therapy   Occupational Therapy Initial Assessment  Date: 2018   Patient Name: Seth Morel  MRN: [id number]     : 3/26/1933    Patient Diagnosis(es): The primary encounter diagnosis was Cerebrovascular accident (CVA), unspecified mechanism ([email or web address] A diagnosis of Essential hypertension was also pertinent to this visit. has a past medical history of Arthralgia; Brain tumor ([facility] COPD (chronic obstructive pulmonary disease) ([email or web address] Diverticulosis; Essential hypertension; Gout; Mild aortic regurgitation; Moderate mitral regurgitation; and Polyarticular arthritis. has a past surgical history that includes Cholecystectomy (); tumor removal (); Hysterectomy (); craniotomy ();  Breast biopsy (&); and Cataract removal.    Treatment Diagnosis: CVA with R side weakness      Restrictions  Restrictions/Precautions  Restrictions/Precautions: Fall Risk  Required Braces or Orthoses?: No    Subjective   General  Chart Reviewed: Yes  Patient assessed for rehabilitation services?: Yes  Family / Caregiver Present: No  Diagnosis: CVA with R side weakness  Pain Assessment  Patient Currently in Pain: Denies  Oxygen Therapy  SpO2: 96 %  O2 Device: None (Room air)  Social/Functional History  Social/Functional History  Lives With: Alone  Type of Home: House  Home Layout: One level  Home Access: Stairs to enter without rails  Entrance Stairs - Number of Steps: 2  Home Equipment:  (none)  Receives Help From: Family  ADL Assistance: Independent  Homemaking Assistance: Independent (does all her own cooking and cleaning)  Homemaking Responsibilities: Yes  Ambulation Assistance: Independent  Transfer Assistance: Independent  Occupation: Retired  Additional Comments: pt. with fall at home       Objective   Vision: Impaired  Vision Exceptions: Wears glasses for reading  Hearing: Exceptions to [facility]  Hearing Exceptions: Hard of hearing/hearing concerns    Orientation  Overall Orientation Status: Within

## 2018-02-12 NOTE — PROGRESS NOTES
BID Navneet De La Torre MD   100 mg at 02/11/18 4549    bisacodyl (DULCOLAX) suppository 10 mg  10 mg Rectal Daily PRN Navneet De La Torre MD        ondansetron (ZOFRAN) injection 4 mg  4 mg Intravenous Q6H PRN Bay Crouch MD        enoxaparin (LOVENOX) injection 40 mg  40 mg Subcutaneous Daily Bay Crouch MD   40 mg at 02/11/18 0949    aspirin tablet 325 mg  325 mg Oral Daily Bay Crouch MD   325 mg at 02/11/18 0948    atorvastatin (LIPITOR) tablet 40 mg  40 mg Oral Nightly Bay Crouch MD   40 mg at 02/11/18 0328    cefTRIAXone (ROCEPHIN) 1 g in sodium chloride (PF) 10 mL IV syringe  1 g Intravenous Q24H Bay Crouch MD 0 mL/hr at 02/11/18 0326 1 g at 02/11/18 0326    allopurinol (ZYLOPRIM) tablet 100 mg  100 mg Oral Daily Melissa Waterforddelroy Crouch MD   100 mg at 02/11/18 0949    metoprolol (LOPRESSOR) injection 5 mg  5 mg Intravenous Once Colby Oliva MD            Labs:     Recent Labs      02/10/18   2055  02/11/18   0625   WBC  7.5  7.3   RBC  4.74  4.14*   HGB  14.6  12.7   HCT  43.7  38.2   MCV  92.2  92.3   MCH  30.8  30.7   MCHC  33.4  33.2   PLT  154  140     Recent Labs      02/10/18   2055   NA  139   K  3.9   ANIONGAP  15   CL  99   CO2  25   BUN  28*   CREATININE  0.9   GLUCOSE  120*   CALCIUM  9.3     No results for input(s): MG, PHOS in the last 72 hours. Recent Labs      02/10/18   2055   AST  21   ALT  17   BILITOT  0.5   ALKPHOS  87     ABGs:No results for input(s): PHART, YZA7JXX, PO2ART, KQB1KDO, BEART, HGBAE, A6MNPHLE, CARBOXHGBART, 02THERAPY in the last 72 hours. HgBA1c: 5.3    FLP:    Lab Results   Component Value Date    TRIG 105 02/11/2018    HDL 49 02/11/2018    LDLCALC 131 02/11/2018     TSH:  No results found for: TSH (pending)    Troponin T:   Recent Labs      02/10/18   2043  02/11/18   0016  02/11/18   0625   TROPONINI  0.00  <0.01  <0.01     INR: No results for input(s): INR in the last 72 hours.     UA 2/10/18 - 1.011, cloudy, (+) nitrite, LES    CT Head 2/10/18  Impression  Postsurgical change from right frontal craniotomy. 2. Changes of aging. 3. No acute intracranial abnormality. Signed by Dr Claire Guerrero on 2/10/2018 8:54 PM    MRI 2/11/18  Impression:   1. Left MCA territory acute ischemia along the insular cortex and left frontal lobe base. Basal ganglia is spared. No significant mass effect. No associated hemorrhage. 2. Large flow voids appear intact. 3. Right frontal lobe encephalomalacia and gliosis related to prior meningioma. Signed by Dr Mark Gardiner on 2/11/2018 12:53 PM    NICS 2/11/18 - pending      Objective:   Vitals: /82   Pulse 60   Temp 97.1 °F (36.2 °C) (Temporal)   Resp 17   Ht 5' 5\" (1.651 m)   Wt 169 lb (76.7 kg)   SpO2 95%   BMI 28.12 kg/m²   24HR INTAKE/OUTPUT:      Intake/Output Summary (Last 24 hours) at 02/11/18 1823  Last data filed at 02/11/18 0856   Gross per 24 hour   Intake              200 ml   Output              750 ml   Net             -550 ml     General appearance: alert and cooperative with exam  HEENT: atraumatic, eyes with clear conjunctiva and normal lids, pupils and irises normal, external ears and nose are normal, lips normal.  Neck without masses, lympadenopathy, bruit, thyroid normal  Lungs: no increased work of breathing, clear to auscultation bilaterally without rales, rhonchi or wheezes  Heart: regular rate and rhythm, S1, S2 normal, no murmur, click, rub or gallop  Abdomen: soft, non-tender; bowel sounds normal; no masses,  no organomegaly  Extremities: extremities normal, atraumatic, no cyanosis or edema  Neurologic: Other than speech and getting confused with complex commands (\"hit the brake, hit the gas - with your foot- she reversed it) I find no focal neurologic deficits, normal sensation, alert and oriented, affect and mood appropriate. Skin: no rashes, nodules.     Assessment and Plan:   Principal Problem:    Stroke with cerebral ischemia - L MCA / frontal region with aphasia/ dysphasia  Active Problems:    Essential hypertension - permissive HTN, medical management    Aphasia due to recent cerebrovascular accident - SLP    Moderate mitral regurgitation - recheck Echo    Acute cystitis without hematuria - Rocephin pending Cx    COPD - no exacerbation    Advance Directive: Full Code    DVT prophylaxis: lovenox    Discharge planning: TBD    MDM: we reviewed above findings, advised OT/PT/SLP, medical management as outlined above, see orders.     Yen Alcala MD  [facility] Hospitalist  nc

## 2018-02-12 NOTE — PROGRESS NOTES
Physical Therapy  Name: Tay Sapp  MRN:  [id number]  Date of service:  2/12/2018 02/12/18 1641   Subjective   Subjective Pt. willing to walk. Bed Mobility   Supine to Sit Supervision   Sit to Supine Supervision   Scooting Supervision   Transfers   Sit to Stand Stand by assistance   Stand to sit Stand by assistance   Ambulation   WB Status no restrictions   Ambulation 1   Surface level tile   Device Rolling Walker; No Device   Assistance Stand by assistance;Contact guard assistance   Quality of Gait steady, no LOB noted   Distance 200'  (100' with walker, 100' without AD)   Comments Patient did well with gait, no LOB without AD, but did reach for handrail a couple of times. Patient Goals    Patient goals  go home, get better   Short term goals   Time Frame for Short term goals 2 wks   Short term goal 1 supine to sit indep   Short term goal 2 sit to stand indep   Short term goal 3 amb. 200' with RW SBA   Short term goal 4 up/down 2 stairs SBA   Conditions Requiring Skilled Therapeutic Intervention   Body structures, Functions, Activity limitations Decreased functional mobility ; Decreased strength;Decreased safe awareness;Decreased cognition;Decreased endurance;Decreased balance;Decreased coordination   Assessment Assisted patient back to bed and left with all needs in reach.    Treatment Diagnosis impaired gait and mobility   Prognosis Good       Electronically signed by Pat Toscano PTA on 2/12/2018 at 4:43 PM

## 2018-02-13 VITALS
HEIGHT: 65 IN | TEMPERATURE: 96.3 F | RESPIRATION RATE: 17 BRPM | HEART RATE: 69 BPM | BODY MASS INDEX: 28.16 KG/M2 | WEIGHT: 169 LBS | OXYGEN SATURATION: 93 % | SYSTOLIC BLOOD PRESSURE: 175 MMHG | DIASTOLIC BLOOD PRESSURE: 75 MMHG

## 2018-02-13 PROCEDURE — 6370000000 HC RX 637 (ALT 250 FOR IP): Performed by: NURSE PRACTITIONER

## 2018-02-13 PROCEDURE — 99232 SBSQ HOSP IP/OBS MODERATE 35: CPT | Performed by: PSYCHIATRY & NEUROLOGY

## 2018-02-13 PROCEDURE — 2580000003 HC RX 258: Performed by: INTERNAL MEDICINE

## 2018-02-13 PROCEDURE — 99239 HOSP IP/OBS DSCHRG MGMT >30: CPT | Performed by: NURSE PRACTITIONER

## 2018-02-13 PROCEDURE — 6370000000 HC RX 637 (ALT 250 FOR IP): Performed by: INTERNAL MEDICINE

## 2018-02-13 PROCEDURE — 6360000002 HC RX W HCPCS: Performed by: INTERNAL MEDICINE

## 2018-02-13 PROCEDURE — 97116 GAIT TRAINING THERAPY: CPT

## 2018-02-13 RX ORDER — AMLODIPINE BESYLATE 5 MG/1
5 TABLET ORAL DAILY
Status: DISCONTINUED | OUTPATIENT
Start: 2018-02-13 | End: 2018-02-13 | Stop reason: HOSPADM

## 2018-02-13 RX ORDER — ASPIRIN 325 MG
325 TABLET ORAL DAILY
Qty: 30 TABLET | Refills: 3 | COMMUNITY
Start: 2018-02-14

## 2018-02-13 RX ORDER — ATORVASTATIN CALCIUM 40 MG/1
40 TABLET, FILM COATED ORAL NIGHTLY
Qty: 30 TABLET | Refills: 0 | Status: SHIPPED | OUTPATIENT
Start: 2018-02-13 | End: 2018-05-23 | Stop reason: ALTCHOICE

## 2018-02-13 RX ADMIN — ASPIRIN 325 MG ORAL TABLET 325 MG: 325 PILL ORAL at 09:54

## 2018-02-13 RX ADMIN — AMLODIPINE BESYLATE 5 MG: 5 TABLET ORAL at 16:54

## 2018-02-13 RX ADMIN — ALLOPURINOL 100 MG: 100 TABLET ORAL at 09:52

## 2018-02-13 RX ADMIN — ENOXAPARIN SODIUM 40 MG: 40 INJECTION SUBCUTANEOUS at 09:52

## 2018-02-13 RX ADMIN — Medication 1 G: at 01:30

## 2018-02-13 RX ADMIN — DOCUSATE SODIUM 100 MG: 100 CAPSULE, LIQUID FILLED ORAL at 09:52

## 2018-02-13 NOTE — PROGRESS NOTES
Physical Therapy  Name: Tanisha Arroyo  MRN:  [id number]  Date of service:  2/13/2018 02/13/18 8870   Subjective   Subjective Patient agreeable to work with therapy. Bed Mobility   Supine to Sit Independent   Sit to Supine Independent   Scooting Independent   Transfers   Sit to Stand Supervision   Stand to sit Supervision   Bed to Chair Supervision   Ambulation   WB Status no restrictions   Ambulation 1   Surface level tile   Device No Device   Assistance Stand by assistance;Contact guard assistance   Quality of Gait steady, no LOB noted   Distance 375'   Patient Goals    Patient goals  go home, get better   Short term goals   Time Frame for Short term goals 2 wks   Short term goal 1 supine to sit indep   Short term goal 2 sit to stand indep   Short term goal 3 amb. 200' with RW SBA   Short term goal 4 up/down 2 stairs SBA   Conditions Requiring Skilled Therapeutic Intervention   Body structures, Functions, Activity limitations Decreased functional mobility ; Decreased strength;Decreased safe awareness;Decreased cognition;Decreased endurance;Decreased balance;Decreased coordination   Assessment Assisted patient back to bed and left with all needs in reach.    Treatment Diagnosis impaired gait and mobility   Prognosis Good       Electronically signed by Constantino Dupree PTA on 2/13/2018 at 3:58 PM

## 2018-02-13 NOTE — PROGRESS NOTES
+------------+-------+-------+--------+-------+------------+---------------+ ! Mid CCA     !65.7   !13.5   !60      !0.79   !            !               ! +------------+-------+-------+--------+-------+------------+---------------+ ! Prox ICA    !62.7   !18.8   !60      !0.7    ! !               ! +------------+-------+-------+--------+-------+------------+---------------+ ! Mid ICA     !62.1   !12.9   !60      !0.79   !            !               ! +------------+-------+-------+--------+-------+------------+---------------+ ! Dist ICA    ! 49.7   !16.4   !50      !0.67   !            !               ! +------------+-------+-------+--------+-------+------------+---------------+ ! Prox ECA    !82.1   !       !60      !       !            !               ! +------------+-------+-------+--------+-------+------------+---------------+ ! Vertebral   !52.8   !9.97   !60      !0.81   !            !               ! +------------+-------+-------+--------+-------+------------+---------------+   - There is antegrade vertebral flow noted on the right side. - Additional Measurements:ICAPSV/CCAPSV 1.05. ICAEDV/CCAEDV 2. Carotid Left Measurements +------------+-------+-------+--------+-------+------------+---------------+ ! Location    ! PSV    ! EDV    ! Angle   ! RI     !%Stenosis   ! Tortuosity     ! +------------+-------+-------+--------+-------+------------+---------------+ ! Prox CCA    !94.4   !14.1   ! 60      !0.85   !            !               ! +------------+-------+-------+--------+-------+------------+---------------+ ! Mid CCA     !66.8   !11.7   !60      !0.82   !            !               ! +------------+-------+-------+--------+-------+------------+---------------+ ! Prox ICA    !58     !18.2   ! 60      !0.69   !            !               ! +------------+-------+-------+--------+-------+------------+---------------+ ! Mid ICA     !42.8   !14.7   !60      !0.66   !            !               ! +------------+-------+-------+--------+-------+------------+---------------+ ! Dist ICA    !41     !14.7   !60      !0.64   !            !               ! +------------+-------+-------+--------+-------+------------+---------------+ ! Prox ECA    !80.9   !       !60      !       !            !               ! +------------+-------+-------+--------+-------+------------+---------------+ ! Vertebral   !43.4   !15.8   !60      !0.64   !            !               ! +------------+-------+-------+--------+-------+------------+---------------+   - There is antegrade vertebral flow noted on the left side. - Additional Measurements:ICAPSV/CCAPSV 0.61. ICAEDV/CCAEDV 1.29. Mri Brain W Wo Contrast    Result Date: 2/11/2018  MRI BRAIN W WO CONTRAST 2/11/2018 11:30 AM HISTORY: Speech difficulty, suspected stroke Comparison: CT scan dated 2/10/2018  Technique: Multiplanar imaging of the brain was performed in a routine fashion before and after the intravenous injection of gadolinium contrast. Findings: Foci of ischemia are seen throughout the left insular cortex within the left MCA vascular territory. There is associated FLAIR hyperintensity reflecting a late acute time course for the ischemia. The basal ganglia is spared. No hemorrhagic transformation or petechial hemorrhage. Encephalomalacia in the right frontal lobe with surrounding gliosis reflecting changes from previously resected meningioma. Overlying craniotomy changes also identified. No intra-axial or extra-axial hemorrhage. Pachymeningeal enhancement along the right frontal convexity is not unexpected in the postoperative setting. Otherwise, no suspicious enhancement identified. Large intracranial flow voids are preserved. Normal appearing flow voids along the left MCA branching with what appears to be normal enhancing MCA branches on the postcontrast images. Chronic appearing paranasal sinus disease. Sella is unremarkable for age.  Posterior fossa structures are unremarkable. Mastoids are clear. Impression: 1. Left MCA territory acute ischemia along the insular cortex and left frontal lobe base. Basal ganglia is spared. No significant mass effect. No associated hemorrhage. 2. Large flow voids appear intact. 3. Right frontal lobe encephalomalacia and gliosis related to prior meningioma. Signed by Dr Bambi Huang on 2/11/2018 12:53 PM      Lab Results   Component Value Date    WBC 7.3 02/11/2018    HGB 12.7 02/11/2018    HCT 38.2 02/11/2018    MCV 92.3 02/11/2018     02/11/2018     Lab Results   Component Value Date     02/12/2018    K 3.6 02/12/2018     02/12/2018    CO2 25 02/12/2018    BUN 19 02/12/2018    CREATININE 0.7 02/12/2018    GLUCOSE 89 02/12/2018    CALCIUM 8.7 (L) 02/12/2018    PROT 7.3 02/10/2018    LABALBU 4.5 02/10/2018    BILITOT 0.5 02/10/2018    ALKPHOS 87 02/10/2018    AST 21 02/10/2018    ALT 17 02/10/2018    LABGLOM >60 02/12/2018    GLOB 2.6 12/26/2015       RECORD REVIEW:   Previous medical records, medications were reviewed at today's visit. Nursing/physician notes, imaging, labs and vitals reviewed. PT,OT and/or speech notes reviewed          ASSESSMENT:  80 y.o. admitted with aphasia and facial drooping, MRI confirms underlying left MCA infarct, clinically doing better overnight, speech improved. Prior left frontal meningioma, s/p craniotomy, stable on imaging.       PLAN:  1. Follow up [address]  2. ASA, statin, DVT proph  3.  Permissive hypertension  4.  PT, OT, ST  5. Rocephin for UTI    Please feel free to call with any questions. [phone] (cell phone).     Garret Mcknight DO  Board Certified Neurology

## 2018-02-13 NOTE — DISCHARGE SUMMARY
[facility] Discharge Summary    Tanisha Freeze  :  3/26/1933  MRN:  [id number]    Admit date:  2/10/2018  Discharge date:  18    Admission Diagnoses: Stroke with cerebral ischemia [facility]) [I63.9]  Aphasia due to recent cerebrovascular accident (CVA) [I69.320]    Discharge Diagnoses:   Principal Problem:    Stroke with cerebral ischemia - L MCA / frontal region with aphasia/ dysphasia - rapid improvement. Started Statin, Full strength ASA, OP PT/ST  Active Problems:    Essential hypertension - permissive HTN, medical management resume Norvasc 18 continue to hold bystolic and hctz until seen by PCP    Aphasia due to recent cerebrovascular accident - SLP    Moderate mitral regurgitation - trace per ECHO    Acute cystitis without hematuria - Rocephin pending Cx asymptomatic treated with 3 days IV no PO    COPD - no exacerbation    Admitting Physician: Carmine Parada MD     Discharge Physician: Dr. Berna Hooper: neurology and rehabilitation medicine    Treatments: IV hydration and antibiotics: ceftriaxone    Brief History: Presented    Hospital Course: 80 y. o. female admitted 2/10/18 via [facility] after an episode of alteration while she was eating  - family noticed she was more quiet than usual, she fell and could not speak, they noticed left facial droop, without weakness. Ct negative , and symptoms gradually improved. Neurology was called and instruction were followed. UA + for uti as well. On admission evaluation she had 7 visitors, I maked 8, she feels  and fine, denies any problems at this time. Aleisha Gilmore does not notice any residuals from the spell of L facial droop, transient aphasia. Aleisha Gilmore is very engaging and interactive.  She cannot do \"peter piper, baby buggy bumpers\" - struggled with \"seashells, [place] artillery\" and did fairly well with \"red leather\" but had trouble with \"th\" - she was surprised we discovered these deficiencies, she'd been talking around them. minutes    Signed:  VINH Doe-BC

## 2018-02-14 LAB — URINE CULTURE, ROUTINE: NORMAL

## 2018-02-14 NOTE — PROGRESS NOTES
Patient visited by [facility] volunteer SCARLETT De La Torre. Received communion.

## 2018-02-15 LAB
EKG P AXIS: 90 DEGREES
EKG P-R INTERVAL: 194 MS
EKG Q-T INTERVAL: 428 MS
EKG QRS DURATION: 84 MS
EKG QTC CALCULATION (BAZETT): 435 MS
EKG T AXIS: 61 DEGREES

## 2018-02-19 ENCOUNTER — HOSPITAL ENCOUNTER (OUTPATIENT)
Dept: PHYSICAL THERAPY | Age: 83
Setting detail: THERAPIES SERIES
Discharge: HOME | End: 2018-02-19
Payer: MEDICARE

## 2018-02-19 PROCEDURE — G8982 BODY POS GOAL STATUS: HCPCS

## 2018-02-19 PROCEDURE — G8981 BODY POS CURRENT STATUS: HCPCS

## 2018-02-19 PROCEDURE — 97162 PT EVAL MOD COMPLEX 30 MIN: CPT

## 2018-02-19 NOTE — PROGRESS NOTES
Tandem ambulation on line  Exercise 6: Tandem stance  Exercise 7: SLS  Exercise 8: Stance on green pads EC  Exercise 9: Stance with balance pertubations  Exercise 10: Mini squats  Exercise 11: Standing hip abd/ext  Exercise 12: Heel raises  Exercise 13: Step ups (fwd/lateral)  Exercise 14: Marching on trampoline  Exercise 15: Leg press  Exercise 16: LAQ  Exercise 17: HS curls  Exercise 18: Treadmill ambulation  Exercise 19: Repeated sit to stand w/o use of UEs                      Assessment   Conditions Requiring Skilled Therapeutic Intervention  Body structures, Functions, Activity limitations: Decreased strength;Decreased balance;Decreased high-level IADLs  Assessment: Pt presents with decreased high level standing balance after CVA, as well as report of decreased endurance. Will benefit from skilled PT intervention to improve strength and balance. Treatment Diagnosis: CVA  Prognosis: Excellent  Decision Making: Medium Complexity  History: s/p MCA CVA  Exam: Dynamic standing balance deficitis  Clinical Presentation: Evolving  Patient Education: Plan of care  REQUIRES PT FOLLOW UP: Yes  Discharge Recommendations: Continue to assess pending progress  Activity Tolerance  Activity Tolerance: Patient Tolerated treatment well         Plan   Plan  Times per week: 2x  Plan weeks: 3 weeks  Current Treatment Recommendations: Strengthening, Balance Training, Functional Mobility Training, Endurance Training, Neuromuscular Re-education, Home Exercise Program, Safety Education & Training, Patient/Caregiver Education & Training, Equipment Evaluation, Education, & procurement    G-Code  PT G-Codes  Functional Assessment Tool Used: Miller Balance Scale  Score: 48/56= 14% impairment  Functional Limitation: Changing and maintaining body position  Changing and Maintaining Body Position Current Status ():  At least 1 percent but less than 20 percent impaired, limited or restricted  Changing and Maintaining Body Position Goal

## 2018-02-20 RX ORDER — ASPIRIN 81 MG/1
81 TABLET ORAL DAILY
COMMUNITY
End: 2018-05-01 | Stop reason: ALTCHOICE

## 2018-02-20 RX ORDER — ALLOPURINOL 100 MG/1
100 TABLET ORAL DAILY
COMMUNITY

## 2018-02-20 RX ORDER — ATORVASTATIN CALCIUM 40 MG/1
40 TABLET, FILM COATED ORAL DAILY
COMMUNITY
End: 2018-05-01 | Stop reason: ALTCHOICE

## 2018-02-20 RX ORDER — AMLODIPINE BESYLATE 2.5 MG/1
5 TABLET ORAL DAILY
COMMUNITY
End: 2019-08-07

## 2018-02-21 ENCOUNTER — APPOINTMENT (OUTPATIENT)
Dept: PHYSICAL THERAPY | Age: 83
End: 2018-02-21
Payer: MEDICARE

## 2018-02-22 ENCOUNTER — APPOINTMENT (OUTPATIENT)
Dept: PHYSICAL THERAPY | Age: 83
End: 2018-02-22
Payer: MEDICARE

## 2018-02-22 ENCOUNTER — LAB (OUTPATIENT)
Dept: LAB | Facility: HOSPITAL | Age: 83
End: 2018-02-22
Attending: PSYCHIATRY & NEUROLOGY

## 2018-02-22 ENCOUNTER — OFFICE VISIT (OUTPATIENT)
Dept: NEUROLOGY | Facility: CLINIC | Age: 83
End: 2018-02-22

## 2018-02-22 VITALS
BODY MASS INDEX: 25.92 KG/M2 | WEIGHT: 175 LBS | HEIGHT: 69 IN | SYSTOLIC BLOOD PRESSURE: 124 MMHG | RESPIRATION RATE: 18 BRPM | HEART RATE: 72 BPM | DIASTOLIC BLOOD PRESSURE: 82 MMHG

## 2018-02-22 DIAGNOSIS — I63.312 CEREBROVASCULAR ACCIDENT (CVA) DUE TO THROMBOSIS OF LEFT MIDDLE CEREBRAL ARTERY (HCC): Primary | ICD-10-CM

## 2018-02-22 LAB
ERYTHROCYTE [SEDIMENTATION RATE] IN BLOOD: <1 MM/HR (ref 0–20)
FOLATE SERPL-MCNC: 17.7 NG/ML
MAGNESIUM SERPL-MCNC: 1.8 MG/DL (ref 1.4–2.2)
VIT B12 BLD-MCNC: 428 PG/ML (ref 239–931)

## 2018-02-22 PROCEDURE — 99204 OFFICE O/P NEW MOD 45 MIN: CPT | Performed by: PSYCHIATRY & NEUROLOGY

## 2018-02-22 PROCEDURE — 82607 VITAMIN B-12: CPT | Performed by: PSYCHIATRY & NEUROLOGY

## 2018-02-22 PROCEDURE — 36415 COLL VENOUS BLD VENIPUNCTURE: CPT | Performed by: PSYCHIATRY & NEUROLOGY

## 2018-02-22 PROCEDURE — 85651 RBC SED RATE NONAUTOMATED: CPT | Performed by: PSYCHIATRY & NEUROLOGY

## 2018-02-22 PROCEDURE — 83735 ASSAY OF MAGNESIUM: CPT | Performed by: PSYCHIATRY & NEUROLOGY

## 2018-02-22 PROCEDURE — 82746 ASSAY OF FOLIC ACID SERUM: CPT | Performed by: PSYCHIATRY & NEUROLOGY

## 2018-02-22 NOTE — PROGRESS NOTES
"Subjective   Angelia Munoz, 3/26/1933, is a female who is being seen today for   Chief Complaint   Patient presents with   • Stroke       HISTORY OF PRESENT ILLNESS: Patient seen for history of stroke.  Patient 2 weeks ago had an episode where she was with friends and suddenly fell forward to the floor not remembering what was going on around her.  She had questionably been quiet shortly before that episode and by the time she got to emergency room she was \"speaking gibberish\".  Patient had at that time finding on MRI of the left MCA vascular territory insular cortex stroke.  She had had the previous right frontal encephalomalacia from a meningioma resection 8 years ago per Dr. Landeros in [place].  She had had no significant residual after that but had been on seizure medication around the time of her surgery.  She was taking 81 mg aspirin daily prior to this event and is now on full dosage aspirin daily with Lipitor.  She had her blood pressure medication Norvasc increased and Bystolic discontinued.  Patient at that time of the stroke had a carotid ultrasound that did not show any abnormalities echocardiogram that did not show any abnormalities.  She does not have any history of cardiac irregularity but is supposed to see Dr. Gauthier next 2 weeks to be evaluated for that.  Patient did have the week before this happened sharp headache pain on the left side that just lasted a day.  Patient's daughter thinks that she had some jaw drooping bilaterally in the emergency room.  Patient has noted a slight tremor in the left hand since this happened.  According to the daughter patient is still having some word finding difficulties at times    REVIEW OF SYSTEMS:   GENERAL: As above  PULMONARY: No acute distress  CVS:  No chest pain or palpitation  GASTROINTESTINAL: No acute GI distress  GENITOURINARY: No acute  distress  GYN: No acute GYN distress  MUSCULOSKELETAL: No acute musculoskeletal symptoms  HEENT:  No acute " vision or hearing change.  Patient does wear hearing aid  ENDOCRINE:  No acute endocrine change  PSYCHIATRIC: No acute psychiatric symptoms  HEMATOLOGY: No anemia  SKIN: No skin changes  Family history reviewed and otherwise noncontributory  Social history: Patient denies smoking or drug  use.  Patient does use alcohol at times    PHYSICAL EXAMINATION:    GENERAL: No acute distress  CRANIUM: Post craniotomy  HEENT: No acute fundic abnormalities.  Pupils equal round reactive to light.       EYES: EOMs intact without nystagmus and fields full to confrontation       EARS:  Tympanic membranes normal hears tuning fork bilaterally       THROAT: No oropharynx abnormalities       NECK:  No bruits/no lymphadenopathy  CHEST: No acute cardiopulmonary abnormalities by auscultation  ABDOMEN: Nondistended  EXTREMITIES: Pulses symmetrical  NEURO: Patient alert and follows commands without difficulty  SPEECH:  Mild word finding difficulties    CRANIAL NERVES:  Motor sensory about the face normal and symmetric    MOTOR STRENGTH:  Motor strength upper and lower extremities normal  STATION AND GAIT:  Gait normal/Romberg negative  CEREBELLAR:  Finger-nose and heel shin normal  SENSORY:  Normal pin and vibration upper and lower extremity except for slight decrease in vibration in the toes bilaterally  REFLEXES:  Reflexes decreased throughout upper and lower extremity without Babinski's or clonus      ASSESSMENT AND PLAN:  Recent left CVA.  Patient is to get MRA brain and EEG.  Also some further blood work and overnight continues oximetry.  Patient is to not drive until released.  Patient's MMSE is 29 of 30 today      Angelia was seen today for stroke.    Diagnoses and all orders for this visit:    Cerebrovascular accident (CVA) due to thrombosis of left middle cerebral artery  -     Ambulatory Referral to Cardiology  -     EEG; Future  -     MRI Angiogram Head Without Contrast; Future  -     Magnesium  -     Sedimentation Rate  -      Vitamin B12  -     Folate  -     Ambulatory Referral to Speech Therapy  -     Overnight Sleep Oximetry Study; Future

## 2018-02-22 NOTE — PATIENT INSTRUCTIONS
Patient not to be driving until released.  No work over hot fires or water/ no work with sharp cutting tools.

## 2018-02-26 ENCOUNTER — HOSPITAL ENCOUNTER (OUTPATIENT)
Dept: PHYSICAL THERAPY | Age: 83
Setting detail: THERAPIES SERIES
Discharge: HOME | End: 2018-02-26
Payer: MEDICARE

## 2018-02-26 PROCEDURE — 97110 THERAPEUTIC EXERCISES: CPT

## 2018-02-26 PROCEDURE — 97112 NEUROMUSCULAR REEDUCATION: CPT

## 2018-02-28 ENCOUNTER — HOSPITAL ENCOUNTER (OUTPATIENT)
Dept: PHYSICAL THERAPY | Age: 83
Setting detail: THERAPIES SERIES
Discharge: HOME | End: 2018-02-28
Payer: MEDICARE

## 2018-02-28 PROCEDURE — 97110 THERAPEUTIC EXERCISES: CPT

## 2018-03-01 ENCOUNTER — DOCUMENTATION (OUTPATIENT)
Dept: NEUROLOGY | Facility: CLINIC | Age: 83
End: 2018-03-01

## 2018-03-01 DIAGNOSIS — I63.312 CEREBROVASCULAR ACCIDENT (CVA) DUE TO THROMBOSIS OF LEFT MIDDLE CEREBRAL ARTERY (HCC): ICD-10-CM

## 2018-03-01 NOTE — PROGRESS NOTES
Spoke with patient at  request about her oxygen testing and need for a polysom.  Patient states she does not want to do that right now and will discuss this further when she comes in for her follow up.

## 2018-03-02 ENCOUNTER — HOSPITAL ENCOUNTER (OUTPATIENT)
Dept: MRI IMAGING | Facility: HOSPITAL | Age: 83
Discharge: HOME | End: 2018-03-02
Attending: PSYCHIATRY & NEUROLOGY

## 2018-03-02 ENCOUNTER — HOSPITAL ENCOUNTER (OUTPATIENT)
Dept: NEUROLOGY | Facility: HOSPITAL | Age: 83
Discharge: HOME | End: 2018-03-02
Attending: PSYCHIATRY & NEUROLOGY | Admitting: PSYCHIATRY & NEUROLOGY

## 2018-03-02 DIAGNOSIS — I63.312 CEREBROVASCULAR ACCIDENT (CVA) DUE TO THROMBOSIS OF LEFT MIDDLE CEREBRAL ARTERY (HCC): ICD-10-CM

## 2018-03-02 DIAGNOSIS — I63.312 CEREBROVASCULAR ACCIDENT (CVA) DUE TO THROMBOSIS OF LEFT MIDDLE CEREBRAL ARTERY (HCC): Primary | ICD-10-CM

## 2018-03-02 PROCEDURE — 70544 MR ANGIOGRAPHY HEAD W/O DYE: CPT

## 2018-03-02 PROCEDURE — 95816 EEG AWAKE AND DROWSY: CPT

## 2018-03-02 PROCEDURE — 95816 EEG AWAKE AND DROWSY: CPT | Performed by: PSYCHIATRY & NEUROLOGY

## 2018-03-03 ENCOUNTER — TELEPHONE (OUTPATIENT)
Dept: NEUROLOGY | Facility: CLINIC | Age: 83
End: 2018-03-03

## 2018-03-03 NOTE — TELEPHONE ENCOUNTER
I contacted the patient to discuss the results of the EEG and MRA brain as well as the oximetry.  We will try to get the 24-hour ambulatory EEG set up for Tuesday

## 2018-03-05 ENCOUNTER — DOCUMENTATION (OUTPATIENT)
Dept: NEUROLOGY | Facility: CLINIC | Age: 83
End: 2018-03-05

## 2018-03-05 ENCOUNTER — OFFICE VISIT (OUTPATIENT)
Dept: CARDIOLOGY | Age: 83
End: 2018-03-05
Payer: MEDICARE

## 2018-03-05 ENCOUNTER — HOSPITAL ENCOUNTER (OUTPATIENT)
Dept: PHYSICAL THERAPY | Age: 83
Setting detail: THERAPIES SERIES
Discharge: HOME | End: 2018-03-05
Payer: MEDICARE

## 2018-03-05 VITALS
SYSTOLIC BLOOD PRESSURE: 104 MMHG | DIASTOLIC BLOOD PRESSURE: 68 MMHG | HEART RATE: 88 BPM | BODY MASS INDEX: 26.22 KG/M2 | HEIGHT: 69 IN | WEIGHT: 177 LBS

## 2018-03-05 DIAGNOSIS — I10 ESSENTIAL HYPERTENSION: ICD-10-CM

## 2018-03-05 DIAGNOSIS — I63.89 CEREBROVASCULAR ACCIDENT (CVA) DUE TO OTHER MECHANISM (HCC): Primary | ICD-10-CM

## 2018-03-05 DIAGNOSIS — I34.0 MODERATE MITRAL REGURGITATION: ICD-10-CM

## 2018-03-05 DIAGNOSIS — I35.1 MILD AORTIC INSUFFICIENCY: ICD-10-CM

## 2018-03-05 DIAGNOSIS — R00.2 PALPITATION: ICD-10-CM

## 2018-03-05 PROCEDURE — G8427 DOCREV CUR MEDS BY ELIG CLIN: HCPCS | Performed by: CLINICAL NURSE SPECIALIST

## 2018-03-05 PROCEDURE — G8417 CALC BMI ABV UP PARAM F/U: HCPCS | Performed by: CLINICAL NURSE SPECIALIST

## 2018-03-05 PROCEDURE — G8598 ASA/ANTIPLAT THER USED: HCPCS | Performed by: CLINICAL NURSE SPECIALIST

## 2018-03-05 PROCEDURE — 1111F DSCHRG MED/CURRENT MED MERGE: CPT | Performed by: CLINICAL NURSE SPECIALIST

## 2018-03-05 PROCEDURE — 1090F PRES/ABSN URINE INCON ASSESS: CPT | Performed by: CLINICAL NURSE SPECIALIST

## 2018-03-05 PROCEDURE — G8399 PT W/DXA RESULTS DOCUMENT: HCPCS | Performed by: CLINICAL NURSE SPECIALIST

## 2018-03-05 PROCEDURE — 1036F TOBACCO NON-USER: CPT | Performed by: CLINICAL NURSE SPECIALIST

## 2018-03-05 PROCEDURE — 1123F ACP DISCUSS/DSCN MKR DOCD: CPT | Performed by: CLINICAL NURSE SPECIALIST

## 2018-03-05 PROCEDURE — 99213 OFFICE O/P EST LOW 20 MIN: CPT | Performed by: CLINICAL NURSE SPECIALIST

## 2018-03-05 PROCEDURE — 97112 NEUROMUSCULAR REEDUCATION: CPT

## 2018-03-05 PROCEDURE — G8484 FLU IMMUNIZE NO ADMIN: HCPCS | Performed by: CLINICAL NURSE SPECIALIST

## 2018-03-05 PROCEDURE — 97110 THERAPEUTIC EXERCISES: CPT

## 2018-03-05 PROCEDURE — 4040F PNEUMOC VAC/ADMIN/RCVD: CPT | Performed by: CLINICAL NURSE SPECIALIST

## 2018-03-05 ASSESSMENT — ENCOUNTER SYMPTOMS
SHORTNESS OF BREATH: 0
BLURRED VISION: 0
VOMITING: 0
ORTHOPNEA: 0
COUGH: 0
HEARTBURN: 0
NAUSEA: 0

## 2018-03-05 NOTE — PROGRESS NOTES
Patient has been notified to be at [facility] on Tues, March 6th at 1:15 for a 1:30pm 24 hour EEG.  Patient voices understanding. She is to come to  office at 12 noon to see him in follow up.  I did let Sima in EEG know that  states patient can bring the EEG monitor back early on Wednesday.   no

## 2018-03-05 NOTE — PROGRESS NOTES
Physical Therapy  Daily Treatment Note  Date: 3/5/2018  Patient Name: Izabel Lin  MRN: [id number]     :   3/26/1933    Subjective:   General  Chart Reviewed: Yes  Additional Pertinent Hx: 79 y/o F presents after CVA. PMH includes hx meningioma with craniotomy, COPD, HTN, hx aortic regurg  Response To Previous Treatment: Not applicable  Family / Caregiver Present: No  Referring Practitioner: Vladimir Olivia CNP  PT Visit Information  PT Insurance Information: Medicare (Primary) and BCBS (Secondary)  Total # of Visits Approved:  (Anticipate 6)  Total # of Visits to Date: 4  Plan of Care/Certification Expiration Date: 18  Progress Note Due Date: 18  Subjective  Subjective: Doing good today. Pain Screening  Patient Currently in Pain: No (no pain post session, just fatigue)       Treatment Activities:         Exercises  Exercise 1: Box steps cw/ccw x 8 each  Exercise 2: Sidestepping on line x 3  Exercise 3: Cone weaves x 3  Exercise 4: Figure 8s x 6  Exercise 5: Tandem ambulation on line x 2  Exercise 6: Tandem stance 3 x 15\" each    - added very minimal pertrubations when RLE forward  Exercise 7: SLS x 45 sec each side (occasssionally touching to railing to regain balance)  Exercise 8: Stance on green pads EO x 1 MIN, EC X 30 seconds  Exercise 9: Stance with balance pertubations x 1 min with min to mod challenges  Exercise 10: Mini squats x 10  Exercise 11: Standing hip abd/ext x 10 each  Exercise 12: Heel raises x 10  Exercise 13:  Step ups (fwd/lateral) x 10 each  Exercise 14: Marching on trampoline x 1 minute  Exercise 15: Leg press 72# seat 5--2/15  Exercise 16: LAQ  2 x 15 bilateral  Exercise 17: HS curls 2 x 10 red band  Exercise 18: Treadmill ambulation--1.9 mph   2' each level, uphill 1% grade, downhill 1% grade------attach safety cord  Exercise 19: Repeated sit to stand w/o use of UEs  x 8.5 in 30 secs from red mat               Assessment:   Conditions Requiring Skilled Therapeutic

## 2018-03-05 NOTE — PROGRESS NOTES
[facility], [address] [place] [address], [id number]  Phone: [phone]  Fax: [phone]    OFFICE VISIT:  3/5/2018    [address]: 3/26/1933    Reason For Visit:  Ian Cheung is a 80 y.o. female who is here for Follow-up (no cardiac symptoms) and Hypertension    HPI   Patient was recently hospitalized here at [facility] for a CVA:   Left MCA territory acute ischemia along the insular cortex and left frontal lobe base. She has been going to physical therapy and feels she is improving. She states she still has some muscle weakness but her aphasia seems to have resolved. She has been seen in our office in the past for hypertension and mild aortic insufficiency. Her hospital discharge summary did not show follow-up with a neurologist, but the patient chose to see Dr. Francoise Osgood who has treated her  in the past. Dr. Francoise Osgood suggested she come to our office for follow-up. He has done an EEG an overnight sleep study. A reason for her stroke she states was not determined. Patient denies any significant palpitations or fast heart rate to suggest atrial fibrillation. Her blood pressure is well-controlled today. She denies any chest pain, unusual dyspnea, orthopnea, PND, or edema    Fatimah Casas MD is PCP.   Izabel Lin has the following history as recorded in [facility]:    Patient Active Problem List    Diagnosis Date Noted    Acute cystitis without hematuria 02/11/2018    COPD [facility]chronic obstructive pulmonary disease) [facility] [address] 02/11/2018    C[facility] ([facility] [address] [facility]     Stroke with cerebral ischemia ([facility] [address] 02/10/2018    Aphasia due to recent cerebrovascular accident (CVA) 02/10/2018    Abnormal PFT 05/11/2017    Essential hypertension 05/11/2017    Mild aortic insufficiency 05/11/2017    SOB (shortness of breath) 08/18/2016    Pedal edema 08/18/2016    Irregular heart rate 11/05/2015    Moderate mitral regurgitation 03/13/2014    Polyarticular arthritis     Brain tumor [facility])      Past Medical History:   Diagnosis Date    Arthralgia     Brain tumor ([facility] [address]     meningioma removed 2009    COPD (chronic obstructive pulmonary disease) ([facility] [address]     Diverticulosis     Essential hypertension     Gout     Mild aortic regurgitation 01/04/2016    Moderate mitral regurgitation 3/13/2014    Polyarticular arthritis     Stroke (cerebrum) [facility])      Past Surgical History:   Procedure Laterality Date    BREAST BIOPSY  1992&2004    CATARACT REMOVAL      CHOLECYSTECTOMY  1996    CRANIOTOMY  2010    HYSTERECTOMY  1971    TUMOR REMOVAL  2009    brain tumor (meningioma)     Family History   Problem Relation Age of Onset    Coronary Art Dis Father     Heart Attack Father     Cancer Sister     Cancer Brother     Heart Attack Mother     Stroke Mother      Social History   Substance Use Topics    Smoking status: Former Smoker     Packs/day: 1.00     Years: 8.00    Smokeless tobacco: Never Used      Comment: Quit 40+ years ago.  Alcohol use No      Comment: occasional      Current Outpatient Prescriptions   Medication Sig Dispense Refill    aspirin 325 MG tablet Take 1 tablet by mouth daily 30 tablet 3    atorvastatin (LIPITOR) 40 MG tablet Take 1 tablet by mouth nightly 30 tablet 0    amLODIPine (NORVASC) 5 MG tablet Take 5 mg by mouth daily      allopurinol (ZYLOPRIM) 100 MG tablet Take 100 mg by mouth daily. No current facility-administered medications for this visit. Allergies: Codeine and Sulfa antibiotics    Review of Systems  Review of Systems   Constitutional: Negative for chills, fever and malaise/fatigue. HENT: Negative for nosebleeds. Eyes: Negative for blurred vision. Respiratory: Negative for cough and shortness of breath. Cardiovascular: Positive for palpitations (rare, brief). Negative for chest pain, orthopnea, leg swelling and PND. Gastrointestinal: Negative for heartburn, nausea and vomiting.    Musculoskeletal: Negative for falls and myalgias. Skin: Negative for rash. Neurological: Negative for dizziness, sensory change, speech change and focal weakness. Endo/Heme/Allergies: Does not bruise/bleed easily. Psychiatric/Behavioral: Negative for depression. The patient is not nervous/anxious. Objective  Vital Signs - /68   Pulse 88   Ht 5' 9\" (1.753 m)   Wt 177 lb (80.3 kg)   BMI 26.14 kg/m²   Physical Exam   Constitutional: She is oriented to person, place, and time. She appears well-developed and well-nourished. No distress. HENT:   Head: Normocephalic and atraumatic. Eyes: Pupils are equal, round, and reactive to light. Right eye exhibits no discharge. Left eye exhibits no discharge. Neck: No JVD present. No tracheal deviation present. Cardiovascular: Normal rate, regular rhythm, normal heart sounds and intact distal pulses. Exam reveals no gallop and no friction rub. No murmur heard. No carotid bruit   Pulmonary/Chest: Effort normal and breath sounds normal. No respiratory distress. She has no wheezes. She has no rales. Abdominal: Soft. There is no tenderness. Musculoskeletal: She exhibits no edema. Normal gait and station   Neurological: She is alert and oriented to person, place, and time. No cranial nerve deficit. Skin: Skin is warm and dry. No rash noted. Psychiatric: She has a normal mood and affect. Her behavior is normal. Judgment normal.   Nursing note and vitals reviewed. Assessment:    1. Cerebrovascular accident (CVA) due to other mechanism [facility])  Cardiac event monitor   2. Palpitation  Cardiac event monitor   3. Essential hypertension     4. Mild aortic insufficiency     5. Moderate mitral regurgitation       Patient is taking medications as prescribed    Echo 2/11/18  Summary   Normal left ventricular size with preserved LV function and an estimated   ejection fraction of approximately 55-60%.   No regional wall motion abnormalities identified.    E/A flow reversal pattern

## 2018-03-06 ENCOUNTER — OFFICE VISIT (OUTPATIENT)
Dept: NEUROLOGY | Facility: CLINIC | Age: 83
End: 2018-03-06

## 2018-03-06 ENCOUNTER — HOSPITAL ENCOUNTER (OUTPATIENT)
Dept: NEUROLOGY | Facility: HOSPITAL | Age: 83
Discharge: HOME | End: 2018-03-06
Attending: PSYCHIATRY & NEUROLOGY

## 2018-03-06 VITALS
HEART RATE: 72 BPM | RESPIRATION RATE: 18 BRPM | BODY MASS INDEX: 26.07 KG/M2 | SYSTOLIC BLOOD PRESSURE: 140 MMHG | HEIGHT: 69 IN | DIASTOLIC BLOOD PRESSURE: 90 MMHG | WEIGHT: 176 LBS

## 2018-03-06 DIAGNOSIS — G47.33 OSA (OBSTRUCTIVE SLEEP APNEA): Primary | ICD-10-CM

## 2018-03-06 DIAGNOSIS — I63.312 CEREBROVASCULAR ACCIDENT (CVA) DUE TO THROMBOSIS OF LEFT MIDDLE CEREBRAL ARTERY (HCC): Primary | ICD-10-CM

## 2018-03-06 DIAGNOSIS — I63.312 CEREBROVASCULAR ACCIDENT (CVA) DUE TO THROMBOSIS OF LEFT MIDDLE CEREBRAL ARTERY (HCC): ICD-10-CM

## 2018-03-06 PROCEDURE — 99213 OFFICE O/P EST LOW 20 MIN: CPT | Performed by: PSYCHIATRY & NEUROLOGY

## 2018-03-06 PROCEDURE — 95953 AMBULATORY EEG: CPT | Performed by: PSYCHIATRY & NEUROLOGY

## 2018-03-06 NOTE — PATIENT INSTRUCTIONS
Patient to go to the emergency room for any further stroke symptoms unusual headaches/episodes of altered consciousness

## 2018-03-06 NOTE — PROGRESS NOTES
Subjective   Angelia Munoz, 3/26/1933, is a female who is being seen today for   Chief Complaint   Patient presents with   • Stroke       HISTORY OF PRESENT ILLNESS: Patient seen with history of left hemispheric stroke.  Patient is had no further unusual symptoms since then.  Patient has now a Zeo patch for monitoring her cardiac situation from Dr. Gauthier.  Patient had EEG that show left temporal slowing with occasional sharp activity and patient is getting 24 hour ambulatory monitoring.  Patient had overnight continues oximetry that  qualified for oxygen and she is to wear her oxygen at night and 2 L per minute daily at bedtime nasal cannula and get overnight continues oximetry on the oxygen.  She is also to do a overnight polysomnogram.  Patient's MRA showed right anterior cerebral changes which are of uncertain etiology and significance.  Patient occasionally has some word finding difficulty and she notices some left hand tremor    REVIEW OF SYSTEMS:   GENERAL: As above  PULMONARY: As above  CVS:  As above  GASTROINTESTINAL: No acute GI distress  GENITOURINARY: No acute  distress  GYN: No acute GYN distress  MUSCULOSKELETAL: No acute musculoskeletal symptoms  HEENT:  No acute vision or hearing change  ENDOCRINE:  No acute endocrine symptoms  PSYCHIATRIC: No acute psychiatric symptoms  HEMATOLOGY: No anemia  SKIN: No skin changes  Family history reviewed and otherwise noncontributory  Social history patient does use alcohol at times but denies smoking or drug use.      PHYSICAL EXAMINATION:    GENERAL: No acute distress  CRANIUM: Normocephalic/atraumatic/atraumatic  HEENT: No acute fundic abnormalities.  Pupils equal round reactive to light.       EYES: EOMs intact without nystagmus and fields full to confrontation       EARS:  Tympanic membranes normal hears tuning fork bilaterally       THROAT: No oropharynx abnormalities       NECK:  No bruits/no lymphadenopathy  CHEST: No acute cardiopulmonary  abnormalities by auscultation  ABDOMEN: Nondistended  EXTREMITIES: Pulses symmetrical  NEURO: Patient alert and follows commands without difficulty  SPEECH:  Normal    CRANIAL NERVES:  Motor sensory about the face normal and symmetric    MOTOR STRENGTH:  Motor strength upper and lower extremities normal  STATION AND GAIT:  Gait is normal/Romberg negative  CEREBELLAR:  Finger-nose and heel shin normal  SENSORY:  Pin and vibration upper and lower extremities normal except for slight decrease in vibration in toes bilaterally  REFLEXES:  Reflexes present and symmetric but decreased throughout upper and lower extremities without Babinski's or clonus      ASSESSMENT AND PLAN:  Recent left CVA with questionable abnormalities on the EEG.  Patient continue not to drive until released.  Patient to go the emergency room immediately if further symptoms occur. Patient's BMI is within normal parameters. No follow-up required.      Angelia was seen today for stroke.    Diagnoses and all orders for this visit:    Cerebrovascular accident (CVA) due to thrombosis of left middle cerebral artery

## 2018-03-07 ENCOUNTER — HOSPITAL ENCOUNTER (OUTPATIENT)
Dept: NEUROLOGY | Facility: HOSPITAL | Age: 83
Discharge: HOME | End: 2018-03-07
Attending: PSYCHIATRY & NEUROLOGY | Admitting: PSYCHIATRY & NEUROLOGY

## 2018-03-07 ENCOUNTER — APPOINTMENT (OUTPATIENT)
Dept: PHYSICAL THERAPY | Age: 83
End: 2018-03-07
Payer: MEDICARE

## 2018-03-07 PROCEDURE — 95950 HC EEG W/O VIDEO RECORDING EACH 24 HRS: CPT

## 2018-03-08 ENCOUNTER — HOSPITAL ENCOUNTER (OUTPATIENT)
Dept: PHYSICAL THERAPY | Age: 83
Setting detail: THERAPIES SERIES
Discharge: HOME | End: 2018-03-08
Payer: MEDICARE

## 2018-03-08 PROCEDURE — G9170 MEMORY D/C STATUS: HCPCS

## 2018-03-08 PROCEDURE — 97110 THERAPEUTIC EXERCISES: CPT

## 2018-03-09 ENCOUNTER — DOCUMENTATION (OUTPATIENT)
Dept: NEUROLOGY | Facility: CLINIC | Age: 83
End: 2018-03-09

## 2018-03-09 NOTE — PROGRESS NOTES
This is a late entry.  I did speak with the patient at  request in regards to her 24 hour EEG results.  This was on 03/07/2018.  I did let her know that the EEG was okay.  She can return to driving but he does suggest she have someone in the car with her for the first couple of times .  She has agreed to this.  I also told her if she has any more stroke like symptoms to go to the ER immediately.

## 2018-03-13 ENCOUNTER — HOSPITAL ENCOUNTER (OUTPATIENT)
Dept: SPEECH THERAPY | Age: 83
Setting detail: THERAPIES SERIES
Discharge: HOME | End: 2018-03-13
Payer: MEDICARE

## 2018-03-13 PROCEDURE — G9170 MEMORY D/C STATUS: HCPCS

## 2018-03-13 PROCEDURE — G9169 MEMORY GOAL STATUS: HCPCS

## 2018-03-13 PROCEDURE — 92507 TX SP LANG VOICE COMM INDIV: CPT

## 2018-03-13 NOTE — PROGRESS NOTES
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       RECENT RESULTS  CT OF HEAD/MRI:  Narrative   MRI BRAIN W WO CONTRAST 2018 11:30 AM   HISTORY: Speech difficulty, suspected stroke   Comparison: CT scan dated 2/10/2018     Technique: Multiplanar imaging of the brain was performed in a routine   fashion before and after the intravenous injection of gadolinium   contrast.   Findings:    Foci of ischemia are seen throughout the left insular cortex within   the left MCA vascular territory. There is associated FLAIR   hyperintensity reflecting a late acute time course for the ischemia. The basal ganglia is spared. No hemorrhagic transformation or   petechial hemorrhage. Encephalomalacia in the right frontal lobe with   surrounding gliosis reflecting changes from previously resected   meningioma. Overlying craniotomy changes also identified. No   intra-axial or extra-axial hemorrhage. Pachymeningeal enhancement   along the right frontal convexity is not unexpected in the   postoperative setting. Otherwise, no suspicious enhancement   identified. Large intracranial flow voids are preserved. Normal   appearing flow voids along the left MCA branching with what appears to   be normal enhancing MCA branches on the postcontrast images.  Chronic

## 2018-03-27 NOTE — PROGRESS NOTES
[facility] Outpatient Physical Therapy  Discharge Summary        Date:3/27/2018    Patient Name:Carol Maron Ahumada    FJQ:[id number]    :3/26/1933    Diagnosis:Diagnosis: CVA           Total # of Visits to Date: 5   Subjective: Doing good today the Dr let me start driving today. Denies any pain             Assessment: Patient did well with session today. She is eager to participate and motivated. Tolerated increased reps today on several exercises again today. LOB several times performing SLS requiring her to grab hand rail to correct balance and one LOB to the rear during perturbations again having to grab hand rail to correct balance     D/C ASSESSMENT: Unable to assess d/c G-Code or goals due to unanticipated d/c. Pt high level after after CVA with good strength and activity tolerance. Last visit 3/8 and pt has no further visits scheduled. Will d/c at this time. GOALS:    Short term goals  Time Frame for Short term goals: 2-3 weeks  Short term goal 1: Independent with HEP  Short term goal 2: Improve bilat LE strength to 5/5  Short term goal 3: Perform 10 sit to  30\" without use of UEs--PROGRESS (8.5 x in 30 secs)      Long term goals  Time Frame for Long term goals : 3-4 weeks  Long term goal 1: Pt to stand for showers instead of using shower chair. Long term goal 2: Normalize gait (improved RED, speed, and control during turns)  Long term goal 3: Report less feeling of fatigue. Long term goal 4: Improve Miller Balance Score to at least 53/56. PLAN:  D/C with HEP.       Electronically signed by Joaquim Roberto PT on 3/27/2018 at 10:30 AM
Patient did well with session today. She is eager to participate and motivated. Tolerated increased reps today on several exercises again today. LOB several times performing SLS requiring her to grab hand rail to correct balance and one LOB to the rear during perturbations again having to grab hand rail to correct balance  Treatment Diagnosis: CVA  REQUIRES PT FOLLOW UP: Yes      G-Code:    Goals:  Short term goals  Time Frame for Short term goals: 2-3 weeks  Short term goal 1: Independent with HEP  Short term goal 2: Improve bilat LE strength to 5/5  Short term goal 3: Perform 10 sit to  30\" without use of UEs--PROGRESS (8.5 x in 30 secs)  Long term goals  Time Frame for Long term goals : 3-4 weeks  Long term goal 1: Pt to stand for showers instead of using shower chair. Long term goal 2: Normalize gait (improved RED, speed, and control during turns)  Long term goal 3: Report less feeling of fatigue. Long term goal 4: Improve Miller Balance Score to at least 53/56.     Plan:    Plan  Times per week: 2x  Plan weeks: 3 weeks  Current Treatment Recommendations: Strengthening, Balance Training, Functional Mobility Training, Endurance Training, Neuromuscular Re-education, Home Exercise Program, Safety Education & Training, Patient/Caregiver Education & Training, Equipment Evaluation, Education, & procurement        Therapy Time   Individual Concurrent Group Co-treatment   Time In [phone]         Time Out 4562         Minutes 62            Danielle Daniels PTA    Electronically signed by Danielle Daniels PTA on 3/8/2018 at 2:32 PM

## 2018-03-28 ENCOUNTER — OFFICE VISIT (OUTPATIENT)
Dept: NEUROLOGY | Facility: CLINIC | Age: 83
End: 2018-03-28

## 2018-03-28 VITALS
SYSTOLIC BLOOD PRESSURE: 124 MMHG | HEART RATE: 72 BPM | WEIGHT: 176 LBS | HEIGHT: 69 IN | RESPIRATION RATE: 18 BRPM | DIASTOLIC BLOOD PRESSURE: 62 MMHG | BODY MASS INDEX: 26.07 KG/M2

## 2018-03-28 DIAGNOSIS — I63.312 CEREBROVASCULAR ACCIDENT (CVA) DUE TO THROMBOSIS OF LEFT MIDDLE CEREBRAL ARTERY (HCC): Primary | ICD-10-CM

## 2018-03-28 PROCEDURE — 99213 OFFICE O/P EST LOW 20 MIN: CPT | Performed by: PSYCHIATRY & NEUROLOGY

## 2018-03-28 NOTE — PROGRESS NOTES
Subjective   Angelia Munoz, 3/26/1933, is a female who is being seen today for   Chief Complaint   Patient presents with   • Stroke       HISTORY OF PRESENT ILLNESS: Patient seen for history of CVA.  Patient sometimes feels also that her left hand shakes intermittently.  Patient says she feels anxious when these episodes occur.  Patient having some further left upper extremity tremor which comes and goes.  Patient has held off on scheduling her PSG but is wearing her oxygen overnight continuous oximetry on the oxygen looks improved.  Patient is had no unusual headaches and episodes of increased weakness or numbness or speech difficulty.  She says that sometimes her legs just feel weak when she gets anxious    REVIEW OF SYSTEMS:   GENERAL: As above  PULMONARY: As above  CVS:  Patient is awaiting her cardiac workup per Dr. Gauthier.  Echocardiogram was reviewed  GASTROINTESTINAL: No acute GI distress  GENITOURINARY: No acute  distress  GYN: No acute GYN distress  MUSCULOSKELETAL: No acute musculoskeletal symptoms  HEENT:  No acute vision or hearing change  ENDOCRINE:  No acute endocrine symptoms  PSYCHIATRIC: As above  HEMATOLOGY: No anemia  SKIN: No skin changes  Family history reviewed and otherwise noncontributory  Social history: Patient does use alcohol at times but denies smoking or drug use      PHYSICAL EXAMINATION:    GENERAL: No acute distress.  Patient does have some intentional tremor of the left upper extremity but no cogwheeling or rigidity  CRANIUM: Normocephalic/atraumatic/atraumatic  HEENT: No acute fundic abnormalities.  Pupils equal round reactive to light.       EYES: EOMs intact without nystagmus and fields full to confrontation       EARS:  Tympanic membranes normal hears tuning fork bilaterally.  Patient does wear hearing aid on the right ear       THROAT: No oropharynx abnormalities.       NECK:  No bruits/no lymphadenopathy  CHEST: No acute cardiopulmonary abnormalities by  auscultation  ABDOMEN: Nondistended  EXTREMITIES: Pulses symmetrical  NEURO: Patient alert and follows commands without difficulty  SPEECH:  I hear no word substitutions today    CRANIAL NERVES:  Motor sensory about the face normal and symmetric    MOTOR STRENGTH:  Motor strength upper and lower extremities normal  STATION AND GAIT:  Gait normal/Romberg negative  CEREBELLAR:  Finger-nose and heel shin normal  SENSORY:  Normal pin and vibration upper and lower extremities except for slight decrease in vibration in the toes bilaterally  REFLEXES:  Reflexes present and symmetric upper and lower extremity but decreased throughout without Babinski's or clonus      ASSESSMENT AND PLAN:  Patient with history of CVA.  Patient to continue oxygen.  BMI is within normal parameters. No follow-up required.      Angelia was seen today for stroke.    Diagnoses and all orders for this visit:    Cerebrovascular accident (CVA) due to thrombosis of left middle cerebral artery

## 2018-03-30 ENCOUNTER — TELEPHONE (OUTPATIENT)
Dept: CARDIOLOGY | Age: 83
End: 2018-03-30

## 2018-03-30 NOTE — TELEPHONE ENCOUNTER
Called patient, advised of results. Patient states she has had problems with Toprol in the past and would really like to come into the office to discuss results and medication changes prior to making them. Advised patient we are completely booked for next week, patient will call Monday to see if there have been any cancellations. Yes

## 2018-04-02 ENCOUNTER — OFFICE VISIT (OUTPATIENT)
Dept: CARDIOLOGY | Age: 83
End: 2018-04-02
Payer: MEDICARE

## 2018-04-02 VITALS
BODY MASS INDEX: 26.22 KG/M2 | WEIGHT: 177 LBS | SYSTOLIC BLOOD PRESSURE: 128 MMHG | HEART RATE: 82 BPM | DIASTOLIC BLOOD PRESSURE: 78 MMHG | HEIGHT: 69 IN

## 2018-04-02 DIAGNOSIS — I47.1 PSVT (PAROXYSMAL SUPRAVENTRICULAR TACHYCARDIA) (HCC): Primary | ICD-10-CM

## 2018-04-02 DIAGNOSIS — I10 ESSENTIAL HYPERTENSION: ICD-10-CM

## 2018-04-02 DIAGNOSIS — Z86.73 HISTORY OF CVA (CEREBROVASCULAR ACCIDENT): ICD-10-CM

## 2018-04-02 PROCEDURE — 1090F PRES/ABSN URINE INCON ASSESS: CPT | Performed by: NURSE PRACTITIONER

## 2018-04-02 PROCEDURE — 1036F TOBACCO NON-USER: CPT | Performed by: NURSE PRACTITIONER

## 2018-04-02 PROCEDURE — 1123F ACP DISCUSS/DSCN MKR DOCD: CPT | Performed by: NURSE PRACTITIONER

## 2018-04-02 PROCEDURE — G8427 DOCREV CUR MEDS BY ELIG CLIN: HCPCS | Performed by: NURSE PRACTITIONER

## 2018-04-02 PROCEDURE — G8417 CALC BMI ABV UP PARAM F/U: HCPCS | Performed by: NURSE PRACTITIONER

## 2018-04-02 PROCEDURE — G8399 PT W/DXA RESULTS DOCUMENT: HCPCS | Performed by: NURSE PRACTITIONER

## 2018-04-02 PROCEDURE — G8598 ASA/ANTIPLAT THER USED: HCPCS | Performed by: NURSE PRACTITIONER

## 2018-04-02 PROCEDURE — 99213 OFFICE O/P EST LOW 20 MIN: CPT | Performed by: NURSE PRACTITIONER

## 2018-04-02 PROCEDURE — 4040F PNEUMOC VAC/ADMIN/RCVD: CPT | Performed by: NURSE PRACTITIONER

## 2018-04-02 RX ORDER — NEBIVOLOL 5 MG/1
0.5 TABLET ORAL DAILY
Qty: 30 TABLET | Refills: 0
Start: 2018-04-02 | End: 2018-04-19 | Stop reason: ALTCHOICE

## 2018-04-03 PROBLEM — I47.10 PSVT (PAROXYSMAL SUPRAVENTRICULAR TACHYCARDIA): Status: ACTIVE | Noted: 2018-04-03

## 2018-04-03 PROBLEM — I47.1 PSVT (PAROXYSMAL SUPRAVENTRICULAR TACHYCARDIA) (HCC): Status: ACTIVE | Noted: 2018-04-03

## 2018-04-03 PROBLEM — Z86.73 HISTORY OF CVA (CEREBROVASCULAR ACCIDENT): Status: ACTIVE | Noted: 2018-04-03

## 2018-04-19 ENCOUNTER — OFFICE VISIT (OUTPATIENT)
Dept: CARDIOLOGY | Age: 83
End: 2018-04-19
Payer: MEDICARE

## 2018-04-19 VITALS
HEIGHT: 69 IN | WEIGHT: 178 LBS | SYSTOLIC BLOOD PRESSURE: 162 MMHG | HEART RATE: 63 BPM | BODY MASS INDEX: 26.36 KG/M2 | DIASTOLIC BLOOD PRESSURE: 84 MMHG

## 2018-04-19 DIAGNOSIS — R29.898 LEG FATIGUE: ICD-10-CM

## 2018-04-19 DIAGNOSIS — I47.1 PSVT (PAROXYSMAL SUPRAVENTRICULAR TACHYCARDIA) (HCC): ICD-10-CM

## 2018-04-19 DIAGNOSIS — I10 ESSENTIAL HYPERTENSION: Primary | ICD-10-CM

## 2018-04-19 PROCEDURE — G8417 CALC BMI ABV UP PARAM F/U: HCPCS | Performed by: NURSE PRACTITIONER

## 2018-04-19 PROCEDURE — 1036F TOBACCO NON-USER: CPT | Performed by: NURSE PRACTITIONER

## 2018-04-19 PROCEDURE — 99213 OFFICE O/P EST LOW 20 MIN: CPT | Performed by: NURSE PRACTITIONER

## 2018-04-19 PROCEDURE — 1090F PRES/ABSN URINE INCON ASSESS: CPT | Performed by: NURSE PRACTITIONER

## 2018-04-19 PROCEDURE — 1123F ACP DISCUSS/DSCN MKR DOCD: CPT | Performed by: NURSE PRACTITIONER

## 2018-04-19 PROCEDURE — 4040F PNEUMOC VAC/ADMIN/RCVD: CPT | Performed by: NURSE PRACTITIONER

## 2018-04-19 PROCEDURE — G8399 PT W/DXA RESULTS DOCUMENT: HCPCS | Performed by: NURSE PRACTITIONER

## 2018-04-19 PROCEDURE — G8598 ASA/ANTIPLAT THER USED: HCPCS | Performed by: NURSE PRACTITIONER

## 2018-04-19 PROCEDURE — G8427 DOCREV CUR MEDS BY ELIG CLIN: HCPCS | Performed by: NURSE PRACTITIONER

## 2018-04-19 PROCEDURE — 93000 ELECTROCARDIOGRAM COMPLETE: CPT | Performed by: NURSE PRACTITIONER

## 2018-04-19 RX ORDER — NEBIVOLOL 5 MG/1
5 TABLET ORAL DAILY
COMMUNITY
End: 2018-07-05 | Stop reason: SDUPTHER

## 2018-05-01 ENCOUNTER — OFFICE VISIT (OUTPATIENT)
Dept: NEUROLOGY | Facility: CLINIC | Age: 83
End: 2018-05-01

## 2018-05-01 VITALS
SYSTOLIC BLOOD PRESSURE: 120 MMHG | WEIGHT: 175 LBS | HEART RATE: 72 BPM | BODY MASS INDEX: 25.92 KG/M2 | RESPIRATION RATE: 18 BRPM | DIASTOLIC BLOOD PRESSURE: 70 MMHG | HEIGHT: 69 IN

## 2018-05-01 DIAGNOSIS — I63.312 CEREBROVASCULAR ACCIDENT (CVA) DUE TO THROMBOSIS OF LEFT MIDDLE CEREBRAL ARTERY (HCC): Primary | ICD-10-CM

## 2018-05-01 PROCEDURE — 99213 OFFICE O/P EST LOW 20 MIN: CPT | Performed by: PSYCHIATRY & NEUROLOGY

## 2018-05-01 RX ORDER — ASPIRIN 325 MG
325 TABLET, DELAYED RELEASE (ENTERIC COATED) ORAL DAILY
COMMUNITY
End: 2019-08-03 | Stop reason: HOSPADM

## 2018-05-01 RX ORDER — NEBIVOLOL 10 MG/1
5 TABLET ORAL 2 TIMES DAILY
COMMUNITY

## 2018-05-01 NOTE — PROGRESS NOTES
Subjective   Angelia Munoz, 3/26/1933, is a female who is being seen today for   Chief Complaint   Patient presents with   • Stroke       HISTORY OF PRESENT ILLNESS: Patient seen for history of CVA and hypoxia.  Patient is had no further stroke symptoms and no unusual headaches.  Patient using her oxygen night nasal cannula.  Patient feels like she is gaining strength.  Patient feels like her tremor in her left upper extremity is better.    REVIEW OF SYSTEMS:   GENERAL: As above  PULMONARY: As above  CVS:  No acute chest pain or palpitation.  Patient continues her undergoing workup by Dr. Gauthier.  Patient is now off her Lipitor her PCP as it seemed to be causing side effects.  Patient continues 325 mg aspirin enteric-coated.    GASTROINTESTINAL: No acute GI distress  GENITOURINARY: No acute  distress  GYN: No acute GYN distress  MUSCULOSKELETAL: No acute musculoskeletal symptoms  HEENT:  No acute vision or hearing change.  Patient wears hearing aid right ear  ENDOCRINE:  No acute endocrine symptoms  PSYCHIATRIC: No acute psychiatric symptoms  HEMATOLOGY: No anemia  SKIN: No skin changes  Family history reviewed and otherwise noncontributory  Social history: Patient does use alcohol at times but denies smoking or drug use      PHYSICAL EXAMINATION:    GENERAL: No acute distress  CRANIUM: Normocephalic/atraumatic/atraumatic  HEENT: No acute fundic abnormalities.  Pupils equal round reactive to light.       EYES: EOMs intact without nystagmus and fields full to confrontation       EARS:  Tympanic membranes normal but partially obscured by wax/ hears tuning fork bilaterally       THROAT: No oropharynx abnormalities       NECK:  No bruits/no lymphadenopathy    CHEST: Motor sensory about the face normal and symmetric  ABDOMEN: Nondistended  EXTREMITIES: Pulses symmetrical  NEURO: Patient alert and follows commands without difficulty  SPEECH:  Normal    CRANIAL NERVES:  Motor sensory about the face normal and  symmetric    MOTOR STRENGTH:  Motor strength upper and lower extremities normal  STATION AND GAIT:  Gait normal/Romberg negative  CEREBELLAR:  Finger-nose and heel shin normal  SENSORY:  Normal pin and vibration upper and lower extremity except for slight decrease in vibration in toes.  REFLEXES:  Reflexes present and symmetric upper and lower extremity but decreased throughout without Babinski's or clonus    ASSESSMENT AND PLAN:  Patient with history of CVA and hypoxia.  Patient to come to emergency room immediately if further stroke symptoms and her DME is following her use of nasal O2.Patient's Body mass index is 25.84 kg/m². BMI is within normal parameters. No follow-up required. Fall Risk Assessment  Fallen in past 6 months: 0--> No  Mental Status: 0--> no mental status change  Mobility: 0--> No mobility issues  Medications: 0--> No meds  Total Fall Risk Score: 2      Angelia was seen today for stroke.    Diagnoses and all orders for this visit:    Cerebrovascular accident (CVA) due to thrombosis of left middle cerebral artery

## 2018-05-01 NOTE — PATIENT INSTRUCTIONS
Patient has been to get to emergency room immediately if further stroke symptoms occur.  Continue oxygen nasally

## 2018-05-23 ENCOUNTER — OFFICE VISIT (OUTPATIENT)
Dept: CARDIOLOGY | Age: 83
End: 2018-05-23
Payer: MEDICARE

## 2018-05-23 VITALS
HEART RATE: 70 BPM | HEIGHT: 69 IN | DIASTOLIC BLOOD PRESSURE: 70 MMHG | WEIGHT: 179 LBS | SYSTOLIC BLOOD PRESSURE: 130 MMHG | BODY MASS INDEX: 26.51 KG/M2

## 2018-05-23 DIAGNOSIS — I10 ESSENTIAL HYPERTENSION: Primary | ICD-10-CM

## 2018-05-23 DIAGNOSIS — I38 VALVULAR HEART DISEASE: ICD-10-CM

## 2018-05-23 PROCEDURE — G8427 DOCREV CUR MEDS BY ELIG CLIN: HCPCS | Performed by: INTERNAL MEDICINE

## 2018-05-23 PROCEDURE — 1090F PRES/ABSN URINE INCON ASSESS: CPT | Performed by: INTERNAL MEDICINE

## 2018-05-23 PROCEDURE — 1123F ACP DISCUSS/DSCN MKR DOCD: CPT | Performed by: INTERNAL MEDICINE

## 2018-05-23 PROCEDURE — G8417 CALC BMI ABV UP PARAM F/U: HCPCS | Performed by: INTERNAL MEDICINE

## 2018-05-23 PROCEDURE — 99213 OFFICE O/P EST LOW 20 MIN: CPT | Performed by: INTERNAL MEDICINE

## 2018-05-23 PROCEDURE — G8399 PT W/DXA RESULTS DOCUMENT: HCPCS | Performed by: INTERNAL MEDICINE

## 2018-05-23 PROCEDURE — 4040F PNEUMOC VAC/ADMIN/RCVD: CPT | Performed by: INTERNAL MEDICINE

## 2018-05-23 PROCEDURE — G8598 ASA/ANTIPLAT THER USED: HCPCS | Performed by: INTERNAL MEDICINE

## 2018-05-23 PROCEDURE — 1036F TOBACCO NON-USER: CPT | Performed by: INTERNAL MEDICINE

## 2018-05-23 RX ORDER — ASPIRIN 325 MG
325 TABLET, DELAYED RELEASE (ENTERIC COATED) ORAL
COMMUNITY
End: 2018-05-23 | Stop reason: ALTCHOICE

## 2018-05-23 RX ORDER — NEBIVOLOL 5 MG/1
5 TABLET ORAL
COMMUNITY
End: 2018-05-23 | Stop reason: ALTCHOICE

## 2018-07-05 RX ORDER — NEBIVOLOL 5 MG/1
5 TABLET ORAL DAILY
Qty: 30 TABLET | Refills: 5 | Status: SHIPPED | OUTPATIENT
Start: 2018-07-05 | End: 2018-11-07 | Stop reason: DRUGHIGH

## 2018-11-07 ENCOUNTER — OFFICE VISIT (OUTPATIENT)
Dept: CARDIOLOGY | Age: 83
End: 2018-11-07
Payer: MEDICARE

## 2018-11-07 VITALS
HEART RATE: 61 BPM | HEIGHT: 69 IN | WEIGHT: 182 LBS | DIASTOLIC BLOOD PRESSURE: 74 MMHG | SYSTOLIC BLOOD PRESSURE: 126 MMHG | BODY MASS INDEX: 26.96 KG/M2

## 2018-11-07 DIAGNOSIS — I34.0 MODERATE MITRAL REGURGITATION: Primary | ICD-10-CM

## 2018-11-07 DIAGNOSIS — I10 ESSENTIAL HYPERTENSION: ICD-10-CM

## 2018-11-07 PROCEDURE — G8484 FLU IMMUNIZE NO ADMIN: HCPCS | Performed by: INTERNAL MEDICINE

## 2018-11-07 PROCEDURE — 4040F PNEUMOC VAC/ADMIN/RCVD: CPT | Performed by: INTERNAL MEDICINE

## 2018-11-07 PROCEDURE — 1090F PRES/ABSN URINE INCON ASSESS: CPT | Performed by: INTERNAL MEDICINE

## 2018-11-07 PROCEDURE — 1101F PT FALLS ASSESS-DOCD LE1/YR: CPT | Performed by: INTERNAL MEDICINE

## 2018-11-07 PROCEDURE — G8417 CALC BMI ABV UP PARAM F/U: HCPCS | Performed by: INTERNAL MEDICINE

## 2018-11-07 PROCEDURE — G8399 PT W/DXA RESULTS DOCUMENT: HCPCS | Performed by: INTERNAL MEDICINE

## 2018-11-07 PROCEDURE — 1036F TOBACCO NON-USER: CPT | Performed by: INTERNAL MEDICINE

## 2018-11-07 PROCEDURE — G8598 ASA/ANTIPLAT THER USED: HCPCS | Performed by: INTERNAL MEDICINE

## 2018-11-07 PROCEDURE — 99213 OFFICE O/P EST LOW 20 MIN: CPT | Performed by: INTERNAL MEDICINE

## 2018-11-07 PROCEDURE — 93000 ELECTROCARDIOGRAM COMPLETE: CPT | Performed by: INTERNAL MEDICINE

## 2018-11-07 PROCEDURE — 1123F ACP DISCUSS/DSCN MKR DOCD: CPT | Performed by: INTERNAL MEDICINE

## 2018-11-07 PROCEDURE — G8427 DOCREV CUR MEDS BY ELIG CLIN: HCPCS | Performed by: INTERNAL MEDICINE

## 2018-11-07 RX ORDER — NEBIVOLOL 10 MG/1
10 TABLET ORAL DAILY
COMMUNITY
End: 2019-01-07 | Stop reason: DRUGHIGH

## 2018-11-07 ASSESSMENT — ENCOUNTER SYMPTOMS
CHEST TIGHTNESS: 0
SHORTNESS OF BREATH: 0
APNEA: 0
COUGH: 0
WHEEZING: 0

## 2018-11-07 NOTE — LETTER
Dear Guevara Huynh MD,    Thank you for allowing me to participate in the care of Ms. Kayla Sellers. She presents today at the [address] [place] [address] in the [facility]. 79 y/o lady followed for hypertension, SVT and prior CVA. Recovered fully from her CVA with no source found - remains on  mg/day alone with no recurrent symptoms. SVT on holter - longest 14 seconds with no significant symptoms coupled. Hypertension controlled and lipids apparently acceptable in the wake of discontinuing statin because of suspected myalgias. No cardiovascular complaints. 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 Hypertension - controlled  2 SVT - inconsequential  3. Dyslipidemia - apparently acceptable off statin  4.  Valvular Disease - only abnormality on 2/18 ECHO - Grade I diastolic dysfunction      Sincerely yours,    Justyn Pryor MD  [facility]

## 2018-11-07 NOTE — PROGRESS NOTES
listed but none significant  2/18     Endocrine:        Past statin/DC'd ? Cramps - subsequent lipids OK   Musculoskeletal:        DJD   Skin: Negative for rash. Neurological: Negative for dizziness, syncope, weakness and light-headedness. Past CVA - left facial paresis - resolved       /74   Pulse 61   Ht 5' 9\" (1.753 m)   Wt 182 lb (82.6 kg)   BMI 26.88 kg/m²     Last BP Reading:  BP Readings from Last 3 Encounters:   11/07/18 126/74   05/23/18 130/70   04/19/18 (!) 162/84        Physical Exam   Constitutional: She appears well-developed and well-nourished. No distress. HENT:   Head: Normocephalic. Eyes: EOM are normal.   Neck: No JVD present. Cardiovascular: Normal rate, regular rhythm and normal heart sounds. Exam reveals no gallop. No murmur heard. Pulmonary/Chest: Effort normal and breath sounds normal. She has no rales. Abdominal: Soft. Bowel sounds are normal.   Musculoskeletal: She exhibits no edema. Skin: She is not diaphoretic. Psychiatric: She has a normal mood and affect. Her behavior is normal. Judgment and thought content normal.   Vitals reviewed. Orders Placed This Encounter   Procedures    EKG 12 lead     Order Specific Question:   Reason for Exam?     Answer: Other     No orders of the defined types were placed in this encounter. ECG interpretation from today:   Normal sinus rhythm with first degree AVB (204 msec) and NSSTTWCs. Assessment / Plan:  1. Hypertension - controlled  2 SVT - inconsequential  3. Dyslipidemia - apparently acceptable off statin  4.  Valvular Disease - only abnormality on 2/18 ECHO - Grade I diastolic dysfunction      Electronically sign by Jen Mcclain MD 11/7/2018 1:30 PM

## 2018-12-31 ENCOUNTER — NURSE TRIAGE (OUTPATIENT)
Dept: CALL CENTER | Facility: HOSPITAL | Age: 83
End: 2018-12-31

## 2018-12-31 NOTE — TELEPHONE ENCOUNTER
"Caller states that she has had a cough for past 3 weeks. States cough produces white sputum. States no fever. States some wheezing-has hx of COPD. Instructed per Care Advice-states will go to [facility] today since PCP office closed.    Reason for Disposition  • Wheezing is present    Additional Information  • Negative: Severe difficulty breathing (e.g., struggling for each breath, speaks in single words)  • Negative: Bluish (or gray) lips or face now  • Negative: [1] Difficulty breathing AND [2] exposure to flames, smoke, or fumes  • Negative: [1] Stridor AND [2] difficulty breathing  • Negative: Sounds like a life-threatening emergency to the triager  • Negative: [1] Previous asthma attacks AND [2] this feels like asthma attack  • Negative: Dry (non-productive) cough (i.e., no sputum or minimal clear sputum)  • Negative: Chest pain  (Exception: MILD central chest pain, present only when coughing)  • Negative: Difficulty breathing  • Negative: Patient sounds very sick or weak to the triager  • Negative: [1] Coughed up blood AND [2] > 1 tablespoon (15 ml) (Exception: blood-tinged sputum)  • Negative: Fever > 103 F (39.4 C)  • Negative: [1] Fever > 101 F (38.3 C) AND [2] age > 60  • Negative: [1] Fever > 100.0 F (37.8 C) AND [2] bedridden (e.g., nursing home patient, CVA, chronic illness, recovering from surgery)  • Negative: [1] Fever > 100.0 F (37.8 C) AND [2] diabetes mellitus or weak immune system (e.g., HIV positive, cancer chemo, splenectomy, chronic steroids)    Answer Assessment - Initial Assessment Questions  1. ONSET: \"When did the cough begin?\"       3 weeks  2. SEVERITY: \"How bad is the cough today?\"       Not coughing as much, but productive.  3. RESPIRATORY DISTRESS: \"Describe your breathing.\"        Wears oxygen at night.  4. FEVER: \"Do you have a fever?\" If so, ask: \"What is your temperature, how was it measured, and when did it start?\"      No fever.  5. SPUTUM: \"Describe the color of your sputum\" " "(clear, white, yellow, green)      white  6. HEMOPTYSIS: \"Are you coughing up any blood?\" If so ask: \"How much?\" (flecks, streaks, tablespoons, etc.)      No blood.  7. CARDIAC HISTORY: \"Do you have any history of heart disease?\" (e.g., heart attack, congestive heart failure)       No   8. LUNG HISTORY: \"Do you have any history of lung disease?\"  (e.g., pulmonary embolus, asthma, emphysema)      COPD hx  9. PE RISK FACTORS: \"Do you have a history of blood clots?\" (or: recent major surgery, recent prolonged travel, bedridden )      Has had a stroke in February.  10. OTHER SYMPTOMS: \"Do you have any other symptoms?\" (e.g., runny nose, wheezing, chest pain)        Some wheezing.  11. PREGNANCY: \"Is there any chance you are pregnant?\" \"When was your last menstrual period?\"         no  12. TRAVEL: \"Have you traveled out of the country in the last month?\" (e.g., travel history, exposures)        no    Protocols used: COUGH - ACUTE PRODUCTIVE-ADULT-AH      "

## 2019-01-07 RX ORDER — NEBIVOLOL HYDROCHLORIDE 5 MG/1
TABLET ORAL
Qty: 30 TABLET | Refills: 5 | Status: SHIPPED | OUTPATIENT
Start: 2019-01-07 | End: 2019-07-03

## 2019-01-29 ENCOUNTER — OFFICE VISIT (OUTPATIENT)
Dept: NEUROLOGY | Facility: CLINIC | Age: 84
End: 2019-01-29

## 2019-01-29 VITALS
HEIGHT: 69 IN | RESPIRATION RATE: 18 BRPM | SYSTOLIC BLOOD PRESSURE: 130 MMHG | DIASTOLIC BLOOD PRESSURE: 80 MMHG | BODY MASS INDEX: 26.36 KG/M2 | HEART RATE: 72 BPM | WEIGHT: 178 LBS

## 2019-01-29 DIAGNOSIS — R09.02 HYPOXIA: ICD-10-CM

## 2019-01-29 DIAGNOSIS — I63.312 CEREBROVASCULAR ACCIDENT (CVA) DUE TO THROMBOSIS OF LEFT MIDDLE CEREBRAL ARTERY (HCC): Primary | ICD-10-CM

## 2019-01-29 PROCEDURE — 99213 OFFICE O/P EST LOW 20 MIN: CPT | Performed by: PSYCHIATRY & NEUROLOGY

## 2019-01-29 NOTE — PATIENT INSTRUCTIONS
Patient to get to emergency room immediately if stroke symptoms occur.  Patient to do overnight continues oximetry on room air

## 2019-01-29 NOTE — PROGRESS NOTES
Subjective   Angelia Munoz, 3/26/1933, is a female who is being seen today for   Chief Complaint   Patient presents with   • Stroke       HISTORY OF PRESENT ILLNESS: Patient seen for history of CVA and hypoxia.  Patient  had no further stroke symptoms.  Patient is due for recertification of her oxygen by doing overnight continuous oximetry on room air.  Patient is wearing her oxygen at night.   follows the patient's history of meningioma and has a recent MRI of the brain done at [facility] which is read as showing previous right frontal craniotomy for resection of meningioma with no sign of recurrence.  There was an area consistent with small vessel infarction left frontal lobe that is nonacute but patient had also mild right sphenoid sinusitis.    REVIEW OF SYSTEMS:   GENERAL: Blood pressure today 130/80 left arm seated and same standing with pulse 72  PULMONARY: As above  CVS:  No acute chest pain or palpitation  GASTROINTESTINAL: No acute GI distress  GENITOURINARY: No acute  distress  GYN: No acute GYN distress  MUSCULOSKELETAL: No acute musculoskeletal symptoms  HEENT:  No acute vision change.  Patient wears bilateral hearing aids.  ENDOCRINE:  No acute endocrine symptoms  PSYCHIATRIC: No acute psychiatric symptoms  HEMATOLOGY: No anemia  SKIN: Patient has dry skin around the ears and is checking with her audiologist about that  Family history reviewed and otherwise noncontributory  Social history: Patient does use alcohol.  Patient denies smoking or drug use    PHYSICAL EXAMINATION:    GENERAL: No acute distress  CRANIUM: Normocephalic/atraumatic  HEENT: No acute fundic abnormalities.  Pupils equal round reactive to light.       EYES: EOMs intact without nystagmus and fields full to confrontation       EARS:  Tympanic membranes normal hears tuning fork bilaterally.  Right tympanic membrane partially obscured by wax       THROAT: No oropharynx abnormalities       NECK:  No bruits/no  lymphadenopathy  CHEST: No acute cardiopulmonary abnormalities by auscultation  ABDOMEN: Nondistended  EXTREMITIES: Pulses symmetrical  NEURO: Patient alert and follows commands without difficulty  SPEECH:  Normal    CRANIAL NERVES:  Motor sensory about the face normal and symmetric    MOTOR STRENGTH:  Motor strength upper and lower extremities normal  STATION AND GAIT:  Gait normal/Romberg negative  CEREBELLAR:  Finger-nose and heel shin normal  SENSORY:  Slight decrease in vibration in toes otherwise normal pin and vibration throughout  REFLEXES:  Reflexes decreased throughout without clonus or Babinski    ASSESSMENT AND PLAN:  Patient with CVA stable.  Patient is to get overnight continues oximetry on room air.  Patient to get to emergency room immediately if further stroke symptoms occur.Patient's Body mass index is 26.29 kg/m². BMI is above normal parameters. Recommendations include: no follow-up required.      Angelia was seen today for stroke.    Diagnoses and all orders for this visit:    Cerebrovascular accident (CVA) due to thrombosis of left middle cerebral artery (CMS/HCC)    Hypoxia  -     Overnight Sleep Oximetry Study; Future

## 2019-02-14 DIAGNOSIS — R09.02 HYPOXIA: ICD-10-CM

## 2019-03-27 ENCOUNTER — OFFICE VISIT (OUTPATIENT)
Dept: PULMONOLOGY | Facility: CLINIC | Age: 84
End: 2019-03-27

## 2019-03-27 VITALS
DIASTOLIC BLOOD PRESSURE: 86 MMHG | OXYGEN SATURATION: 98 % | HEART RATE: 67 BPM | BODY MASS INDEX: 25.92 KG/M2 | HEIGHT: 69 IN | WEIGHT: 175 LBS | SYSTOLIC BLOOD PRESSURE: 140 MMHG

## 2019-03-27 DIAGNOSIS — R05.9 COUGH: ICD-10-CM

## 2019-03-27 DIAGNOSIS — J44.1 COPD WITH EXACERBATION (HCC): Primary | ICD-10-CM

## 2019-03-27 DIAGNOSIS — J44.9 STAGE 2 MODERATE COPD BY GOLD CLASSIFICATION (HCC): Primary | ICD-10-CM

## 2019-03-27 DIAGNOSIS — R06.02 SHORTNESS OF BREATH: ICD-10-CM

## 2019-03-27 LAB
FEV1/FVC: NORMAL %
FEV1: NORMAL LITERS
FVC VOL RESPIRATORY: NORMAL LITERS

## 2019-03-27 PROCEDURE — 99214 OFFICE O/P EST MOD 30 MIN: CPT | Performed by: INTERNAL MEDICINE

## 2019-03-27 PROCEDURE — 94010 BREATHING CAPACITY TEST: CPT | Performed by: INTERNAL MEDICINE

## 2019-03-27 RX ORDER — AMOXICILLIN AND CLAVULANATE POTASSIUM 875; 125 MG/1; MG/1
1 TABLET, FILM COATED ORAL 2 TIMES DAILY
COMMUNITY
End: 2019-06-26

## 2019-03-27 NOTE — PROGRESS NOTES
Performed PFT. See scanned results for additional information. Pt recovering from bronchitis only FVL done.

## 2019-03-27 NOTE — PATIENT INSTRUCTIONS
I did advise the patient that her pulmonary functions had shown some decline but that was not unexpected with a recent exacerbation of her COPD.  Samples of Stiolto Respimat were provided and I will see her back in follow-up in 3 months and get a repeat flow volume loop at that time.

## 2019-03-28 NOTE — PROGRESS NOTES
Subjective   Angelia Munoz is a 86 y.o. female.     Chief Complaint   Patient presents with   • Cough   • Shortness of Breath   • Wheezing      No My Sticky Note on file.    History of Present Illness   The patient returns today for follow-up to include a flow volume loop.  Complete pulmonary functions were to be performed but she currently is just finished up treatment for an exacerbation of her COPD so a flow volume loop lung was performed today.  She has been on antibiotics which she is finishing up and also received a steroid injection.  She has used the Stiolto Respimat but not at present until we will get her back on this.  Medical/Family/Social History   has a past medical history of Stroke (CMS/[facility]).   has a past surgical history that includes Gallbladder surgery; Hysterectomy; Craniotomy; Breast biopsy; and Eye surgery.  family history is not on file.   reports that she has never smoked. She has never used smokeless tobacco. She reports that she drinks alcohol. She reports that she does not use drugs.  Allergies   Allergen Reactions   • Ciprofloxacin Other (See Comments)     No reaction noted   • Codeine Other (See Comments)     No reaction noted   • Sulfa Antibiotics Other (See Comments)     No reaction noted   • Tekturna [Aliskiren] Other (See Comments)     No reaction noted     Medications    Current Outpatient Medications:   •  allopurinol (ZYLOPRIM) 100 MG tablet, Take 100 mg by mouth Daily., Disp: , Rfl:   •  amLODIPine (NORVASC) 2.5 MG tablet, Take 5 mg by mouth Daily., Disp: , Rfl:   •  amoxicillin-clavulanate (AUGMENTIN) 875-125 MG per tablet, Take 1 tablet by mouth 2 (Two) Times a Day., Disp: , Rfl:   •  aspirin  MG tablet, Take 325 mg by mouth Daily., Disp: , Rfl:   •  nebivolol (BYSTOLIC) 5 MG tablet, Take 5 mg by mouth Daily., Disp: , Rfl:   •  O2 (OXYGEN), Inhale 2 L/min 1 (One) Time., Disp: , Rfl:   •  tiotropium bromide-olodaterol (STIOLTO RESPIMAT) 2.5-2.5 MCG/ACT aerosol solution  "inhaler, Inhale 1 puff Daily., Disp: 4 inhaler, Rfl: 0    Review of Systems   Constitutional: Negative for chills and fever.   HENT: Negative for congestion.    Eyes: Negative for visual disturbance.   Respiratory: Positive for cough, shortness of breath and wheezing.    Cardiovascular: Negative for chest pain.   Gastrointestinal: Negative for diarrhea, nausea and vomiting.   Genitourinary: Negative for difficulty urinating.   Musculoskeletal: Negative for arthralgias.   Skin: Negative for rash.   Neurological: Negative for dizziness and speech difficulty.   Hematological: Negative for adenopathy.   Psychiatric/Behavioral: The patient is not nervous/anxious.      ------------------------------------  Objective   /86   Pulse 67   Ht 175.3 cm (69\")   Wt 79.4 kg (175 lb)   SpO2 98% Comment: RA  Breastfeeding? No   BMI 25.84 kg/m²   Physical Exam   Constitutional: She is oriented to person, place, and time. She appears well-developed and well-nourished.   HENT:   Head: Normocephalic and atraumatic.   Eyes: EOM are normal. Pupils are equal, round, and reactive to light.   Neck: Normal range of motion. Neck supple.   Cardiovascular: Normal rate, regular rhythm and normal heart sounds.   Pulmonary/Chest: Effort normal.   A few minimal expiratory rhonchi and end-expiratory wheezes are present at the bases.   Abdominal: Soft.   Musculoskeletal: Normal range of motion.   Lymphadenopathy:   No adenopathy is palpated.   Neurological: She is alert and oriented to person, place, and time.   Skin: Skin is warm and dry. No rash noted.   Psychiatric: She has a normal mood and affect.   Nursing note and vitals reviewed.          Pulmonary Functions Testing Results:  FEV1   Date Value Ref Range Status   03/27/2019 50% liters Final     FVC   Date Value Ref Range Status   03/27/2019 61% liters Final     FEV1/FVC   Date Value Ref Range Status   03/27/2019 61.17% % Final      My interpretation of PFT:  1.  Spirometry is " consistent with a moderate bordering on severe obstructive ventilatory defect.  This does show worsening compared to previous but that is not unexpected with her currently being treated for an acute exacerbation of her COPD.  Assessment/Plan   Angelia was seen today for cough, shortness of breath and wheezing.    Diagnoses and all orders for this visit:    COPD with exacerbation (CMS/[facility])  -     Pulmonary Function Test  -     tiotropium bromide-olodaterol (STIOLTO RESPIMAT) 2.5-2.5 MCG/ACT aerosol solution inhaler; Inhale 1 puff Daily.    Cough    Shortness of breath      Patient's Body mass index is 25.84 kg/m². BMI is within normal parameters. No follow-up required..      I will see her back in follow-up in about 3 months with a flow volume loop on return and samples of the Stiolto Respimat were provided.

## 2019-05-06 ENCOUNTER — OFFICE VISIT (OUTPATIENT)
Dept: CARDIOLOGY | Age: 84
End: 2019-05-06
Payer: MEDICARE

## 2019-05-06 VITALS
SYSTOLIC BLOOD PRESSURE: 128 MMHG | BODY MASS INDEX: 25.92 KG/M2 | WEIGHT: 175 LBS | HEIGHT: 69 IN | DIASTOLIC BLOOD PRESSURE: 79 MMHG | HEART RATE: 68 BPM

## 2019-05-06 DIAGNOSIS — I35.1 MILD AORTIC INSUFFICIENCY: ICD-10-CM

## 2019-05-06 DIAGNOSIS — I47.1 PSVT (PAROXYSMAL SUPRAVENTRICULAR TACHYCARDIA) (HCC): ICD-10-CM

## 2019-05-06 DIAGNOSIS — I34.0 MODERATE MITRAL REGURGITATION: Primary | ICD-10-CM

## 2019-05-06 DIAGNOSIS — I10 ESSENTIAL HYPERTENSION: ICD-10-CM

## 2019-05-06 PROCEDURE — 1090F PRES/ABSN URINE INCON ASSESS: CPT | Performed by: NURSE PRACTITIONER

## 2019-05-06 PROCEDURE — G8599 NO ASA/ANTIPLAT THER USE RNG: HCPCS | Performed by: NURSE PRACTITIONER

## 2019-05-06 PROCEDURE — G8417 CALC BMI ABV UP PARAM F/U: HCPCS | Performed by: NURSE PRACTITIONER

## 2019-05-06 PROCEDURE — 4040F PNEUMOC VAC/ADMIN/RCVD: CPT | Performed by: NURSE PRACTITIONER

## 2019-05-06 PROCEDURE — 1123F ACP DISCUSS/DSCN MKR DOCD: CPT | Performed by: NURSE PRACTITIONER

## 2019-05-06 PROCEDURE — G8427 DOCREV CUR MEDS BY ELIG CLIN: HCPCS | Performed by: NURSE PRACTITIONER

## 2019-05-06 PROCEDURE — 99213 OFFICE O/P EST LOW 20 MIN: CPT | Performed by: NURSE PRACTITIONER

## 2019-05-06 PROCEDURE — 1036F TOBACCO NON-USER: CPT | Performed by: NURSE PRACTITIONER

## 2019-05-06 PROCEDURE — 93000 ELECTROCARDIOGRAM COMPLETE: CPT | Performed by: NURSE PRACTITIONER

## 2019-05-06 RX ORDER — CLONIDINE HYDROCHLORIDE 0.1 MG/1
0.1 TABLET ORAL 2 TIMES DAILY PRN
COMMUNITY
End: 2019-05-06 | Stop reason: SDUPTHER

## 2019-05-06 RX ORDER — CLONIDINE HYDROCHLORIDE 0.1 MG/1
0.1 TABLET ORAL 2 TIMES DAILY PRN
Qty: 60 TABLET | Refills: 3 | Status: SHIPPED | OUTPATIENT
Start: 2019-05-06

## 2019-05-06 RX ORDER — AMLODIPINE BESYLATE 2.5 MG/1
2.5 TABLET ORAL DAILY
COMMUNITY
End: 2019-05-20 | Stop reason: ALTCHOICE

## 2019-05-06 NOTE — PROGRESS NOTES
Dear Guevara Larson MD & Yang Hrenandez MD,    Thank you for allowing me to participate in the care of Ms. Anabell Molina. She presents today at the [address] [place] [address] in the [facility]. As you know, Ms. Brandon Orellana is a 80 y.o. female with history of hypertension, SVT and prior CVA. who presents with the chief complaint of six-month follow-up of chronic cardiac conditions. She is a patient of Dr. Oswaldo Lopez. HTN-blood pressures at home have been 150-190s/80-90s. Increase on Norvasc made her feet/ ankles swell. Bystolic is currently 5 mg daily but has taken 10 before. Cuff was checked today and is accurate. HR 50-70s. SVT-no palpitations. CVA- last Feb 2018. Has oxygen nightly     She otherwise denies chest pain, SOA, FARNSWORTH, PND, orthopnea, syncope or near syncope. She has no other complaints. Review of Systems    Constitutional: Negative for fever, chills, diaphoresis, activity change, appetite change, fatigue and unexpected weight change. Eyes: Negative for photophobia, pain, redness and visual disturbance. Respiratory: Negative for apnea, cough, chest tightness, shortness of breath, wheezing and stridor. Cardiovascular: Negative for chest pain, palpitations and leg swelling. Gastrointestinal: Negative for abdominal distention. Genitourinary: Negative for dysuria, urgency and frequency. Musculoskeletal: Negative for myalgias, arthralgias and gait problem. Skin: Negative for color change, pallor, rash and wound. Neurological: Negative for dizziness, tremors, speech difficulty, weakness and numbness. Hematological: Does not bruise/bleed easily. Psychiatric/Behavioral: Negative.         Past Medical History:   Diagnosis Date    Arthralgia     Brain tumor [facility])     meningioma removed 2009    COPD (chronic obstructive pulmonary disease) (HCC)     Diverticulosis     Essential hypertension     Gout     Mild aortic regurgitation 01/04/2016    Moderate mitral regurgitation 3/13/2014    Polyarticular arthritis     Stroke (cerebrum) ([facility] [address] 02/10/2018       Past Surgical History:   Procedure Laterality Date    BREAST BIOPSY  1992&2004    CATARACT REMOVAL      CHOLECYSTECTOMY  1996    CRANIOTOMY  2010    DIAGNOSTIC CARDIAC CATH LAB PROCEDURE      HYSTERECTOMY  1971    TUMOR REMOVAL  2009    brain tumor (meningioma)       Family History   Problem Relation Age of Onset    Coronary Art Dis Father     Heart Attack Father     Cancer Sister     Cancer Brother     Heart Attack Mother     Stroke Mother        Social History     Socioeconomic History    Marital status:      Spouse name: Not on file    Number of children: Not on file    Years of education: Not on file    Highest education level: Not on file   Occupational History     Comment: retired   Social Needs    Financial resource strain: Not on file    Food insecurity:     Worry: Not on file     Inability: Not on file   [employer] needs:     Medical: Not on file     Non-medical: Not on file   Tobacco Use    Smoking status: Former Smoker     Packs/day: 1.00     Years: 8.00     Pack years: 8.00    Smokeless tobacco: Never Used    Tobacco comment: Quit 40+ years ago.    Substance and Sexual Activity    Alcohol use: No     Comment: occasional    Drug use: No    Sexual activity: Not on file   Lifestyle    Physical activity:     Days per week: Not on file     Minutes per session: Not on file    Stress: Not on file   Relationships    Social connections:     Talks on phone: Not on file     Gets together: Not on file     Attends Buddhist service: Not on file     Active member of club or organization: Not on file     Attends meetings of clubs or organizations: Not on file     Relationship status: Not on file    Intimate partner violence:     Fear of current or ex partner: Not on file     Emotionally abused: Not on file     Physically abused: Not on file     Forced sexual activity: Not on file   Other Topics Concern    Not on file   Social History Narrative    Not on file       Allergies   Allergen Reactions    Aliskiren Other (See Comments)     No reaction noted    Ciprofibrate     Ciprofloxacin Other (See Comments)     No reaction noted    Codeine     Sulfa Antibiotics          Current Outpatient Medications:     amLODIPine (NORVASC) 2.5 MG tablet, Take 2.5 mg by mouth daily, Disp: , Rfl:     cloNIDine (CATAPRES) 0.1 MG tablet, Take 1 tablet by mouth 2 times daily as needed for High Blood Pressure (for BP > 160/90), Disp: 60 tablet, Rfl: 3    BYSTOLIC 5 MG tablet, TAKE 1 TABLET BY MOUTH ONCE DAILY. , Disp: 30 tablet, Rfl: 5    aspirin 325 MG tablet, Take 1 tablet by mouth daily, Disp: 30 tablet, Rfl: 3    allopurinol (ZYLOPRIM) 100 MG tablet, Take 100 mg by mouth daily. , Disp: , Rfl:     PE:  Vitals:    05/06/19 1415   BP: 128/79   Pulse:        Estimated body mass index is 25.84 kg/m² as calculated from the following:    Height as of this encounter: 5' 9\" (1.753 m). Weight as of this encounter: 175 lb (79.4 kg). Constitutional: She is oriented to person, place, and time. She appears well-developed and well-nourished in no acute distress. Head: Normocephalic and atraumatic. Neck:  Neck supple without JVD present. Cardiovascular: Normal rate, regular rhythm, normal heart sounds. no murmur ascultated. No gallop and no friction rub.  no carotid bruits. no peripheral edema. Pulmonary/Chest:  Lungs clear to auscultation bilaterally without evidence of respiratory distress. She without wheezes. She without rales or ronchi. Musculoskeletal: Normal range of motion. Gait is normal no assitive device. Neurological: She is alert and oriented to person, place, and time. Skin: Skin is warm and dry without rash or pallor. Psychiatric: She has a normal mood and affect.  Her behavior is normal. Thought content normal.     Lab Results   Component Value Date

## 2019-05-06 NOTE — PATIENT INSTRUCTIONS
Continue to take BP log taking readings 2 hours after taking medications  Keep track for the next week and if staying elevated, start taking Bystolic 10 mg daily. Take Clonidine as needed for /90 or greater. Completing a Blood Pressure Log    Your doctor has recommended completing a blood pressure log to see what your blood pressure runs at home. Managing your blood pressure can be very beneficial in your overall health. Listed below are a few tips and instructions on how to check your blood pressure correctly. · Always check your blood pressure AFTER taking all blood pressure medications. Waiting about 2 hours gives the best results on how your medication is working. · Before checking your blood pressure come inside, sit comfortably for 5 to 10 minutes and then check your blood pressure. Your arm should rest comfortably, sit up straight with your back against the chair, legs uncrossed and feet touching the floor. · Your blood pressure will typically run higher when you have recently been excercising, smoking, or drinking caffiene and can give inaccurate readings, try to wait 30 minutes before checking your blood pressure. · Check your blood pressure in the mornings and at night for  3 days a week and write it down. After 2 to 3 weeks of gathering readings you can call, e-mail, or fax in your results. Our office number is [phone], our fax number is [phone], and you can always e-mail us via [employer] to send your results directly to your Doctor.

## 2019-05-20 ENCOUNTER — OFFICE VISIT (OUTPATIENT)
Dept: CARDIOLOGY | Age: 84
End: 2019-05-20
Payer: MEDICARE

## 2019-05-20 VITALS
BODY MASS INDEX: 25.77 KG/M2 | SYSTOLIC BLOOD PRESSURE: 140 MMHG | HEIGHT: 69 IN | DIASTOLIC BLOOD PRESSURE: 86 MMHG | WEIGHT: 174 LBS | HEART RATE: 70 BPM

## 2019-05-20 DIAGNOSIS — I10 UNCONTROLLED HYPERTENSION: Primary | ICD-10-CM

## 2019-05-20 PROCEDURE — G8427 DOCREV CUR MEDS BY ELIG CLIN: HCPCS | Performed by: NURSE PRACTITIONER

## 2019-05-20 PROCEDURE — 1090F PRES/ABSN URINE INCON ASSESS: CPT | Performed by: NURSE PRACTITIONER

## 2019-05-20 PROCEDURE — 1036F TOBACCO NON-USER: CPT | Performed by: NURSE PRACTITIONER

## 2019-05-20 PROCEDURE — 1123F ACP DISCUSS/DSCN MKR DOCD: CPT | Performed by: NURSE PRACTITIONER

## 2019-05-20 PROCEDURE — 4040F PNEUMOC VAC/ADMIN/RCVD: CPT | Performed by: NURSE PRACTITIONER

## 2019-05-20 PROCEDURE — G8417 CALC BMI ABV UP PARAM F/U: HCPCS | Performed by: NURSE PRACTITIONER

## 2019-05-20 PROCEDURE — G8599 NO ASA/ANTIPLAT THER USE RNG: HCPCS | Performed by: NURSE PRACTITIONER

## 2019-05-20 PROCEDURE — 99213 OFFICE O/P EST LOW 20 MIN: CPT | Performed by: NURSE PRACTITIONER

## 2019-05-20 RX ORDER — NIFEDIPINE 60 MG/1
60 TABLET, EXTENDED RELEASE ORAL DAILY
Qty: 90 TABLET | Refills: 1 | Status: SHIPPED | OUTPATIENT
Start: 2019-05-20 | End: 2019-05-31 | Stop reason: ALTCHOICE

## 2019-05-20 NOTE — PROGRESS NOTES
Dear Guevara Goldberg MD & Keaton Mullen MD,    Thank you for allowing me to participate in the care of Ms. Mookie Mcclellan. She presents today at the [address] [place] [address] in the [facility]. As you know, Ms. Shelby Redman is a 80 y.o. female with history of hypertension, SVT and prior CVA. who presents with the chief complaint of hypertension. She is a patient of Dr. Elizabeth Pang. HTN-on Norvasc 2.5 and Bystolic 5 mg. BP still elevated. Increase in Norvasc made feet/ankles swell. BP still around 136-184/ . Has had to take clonidine 3 times since last seen. HR in the 65-73 range. She otherwise denies chest pain, SOA, FARNSWORTH, PND, orthopnea, syncope or near syncope. She has no other complaints. Review of Systems    Constitutional: Negative for fever, chills, diaphoresis, activity change, appetite change, fatigue and unexpected weight change. Eyes: Negative for photophobia, pain, redness and visual disturbance. Respiratory: Negative for apnea, cough, chest tightness, shortness of breath, wheezing and stridor. Cardiovascular: Negative for chest pain, palpitations and leg swelling. Gastrointestinal: Negative for abdominal distention. Genitourinary: Negative for dysuria, urgency and frequency. Musculoskeletal: Negative for myalgias, arthralgias and gait problem. Skin: Negative for color change, pallor, rash and wound. Neurological: Negative for dizziness, tremors, speech difficulty, weakness and numbness. Hematological: Does not bruise/bleed easily. Psychiatric/Behavioral: Negative.         Past Medical History:   Diagnosis Date    Arthralgia     Brain tumor [facility])     meningioma removed 2009    COPD (chronic obstructive pulmonary disease) ([facility] [address]     Diverticulosis     Essential hypertension     Gout     Mild aortic regurgitation 01/04/2016    Moderate mitral regurgitation 3/13/2014    Polyarticular arthritis     Stroke (cerebrum) ([facility] [address] 02/10/2018       Past Surgical History:   Procedure Laterality Date    BREAST BIOPSY  1992&2004    CATARACT REMOVAL      CHOLECYSTECTOMY  1996    CRANIOTOMY  2010    DIAGNOSTIC CARDIAC CATH LAB PROCEDURE      HYSTERECTOMY  1971    TUMOR REMOVAL  2009    brain tumor (meningioma)       Family History   Problem Relation Age of Onset    Coronary Art Dis Father     Heart Attack Father     Cancer Sister     Cancer Brother     Heart Attack Mother     Stroke Mother        Social History     Socioeconomic History    Marital status:      Spouse name: Not on file    Number of children: Not on file    Years of education: Not on file    Highest education level: Not on file   Occupational History     Comment: retired   Social Needs    Financial resource strain: Not on file    Food insecurity:     Worry: Not on file     Inability: Not on file   [employer] needs:     Medical: Not on file     Non-medical: Not on file   Tobacco Use    Smoking status: Former Smoker     Packs/day: 1.00     Years: 8.00     Pack years: 8.00    Smokeless tobacco: Never Used    Tobacco comment: Quit 40+ years ago.    Substance and Sexual Activity    Alcohol use: No     Comment: occasional    Drug use: No    Sexual activity: Not on file   Lifestyle    Physical activity:     Days per week: Not on file     Minutes per session: Not on file    Stress: Not on file   Relationships    Social connections:     Talks on phone: Not on file     Gets together: Not on file     Attends Lutheran service: Not on file     Active member of club or organization: Not on file     Attends meetings of clubs or organizations: Not on file     Relationship status: Not on file    Intimate partner violence:     Fear of current or ex partner: Not on file     Emotionally abused: Not on file     Physically abused: Not on file     Forced sexual activity: Not on file   Other Topics Concern    Not on file   Social History Narrative    Not on file Allergies   Allergen Reactions    Aliskiren Other (See Comments)     No reaction noted    Ciprofibrate     Ciprofloxacin Other (See Comments)     No reaction noted    Codeine     Sulfa Antibiotics          Current Outpatient Medications:     NIFEdipine (PROCARDIA XL) 60 MG extended release tablet, Take 1 tablet by mouth daily, Disp: 90 tablet, Rfl: 1    cloNIDine (CATAPRES) 0.1 MG tablet, Take 1 tablet by mouth 2 times daily as needed for High Blood Pressure (for BP > 160/90), Disp: 60 tablet, Rfl: 3    BYSTOLIC 5 MG tablet, TAKE 1 TABLET BY MOUTH ONCE DAILY. , Disp: 30 tablet, Rfl: 5    aspirin 325 MG tablet, Take 1 tablet by mouth daily, Disp: 30 tablet, Rfl: 3    allopurinol (ZYLOPRIM) 100 MG tablet, Take 100 mg by mouth daily. , Disp: , Rfl:     PE:  Vitals:    05/20/19 1421   BP: (!) 140/86   Pulse:        Estimated body mass index is 25.7 kg/m² as calculated from the following:    Height as of this encounter: 5' 9\" (1.753 m). Weight as of this encounter: 174 lb (78.9 kg). Constitutional: She is oriented to person, place, and time. She appears well-developed and well-nourished in no acute distress. Head: Normocephalic and atraumatic. Neck:  Neck supple without JVD present. Cardiovascular: Normal rate, regular rhythm, normal heart sounds. no murmur ascultated. No gallop and no friction rub.  no carotid bruits. no peripheral edema. Pulmonary/Chest:  Lungs clear to auscultation bilaterally without evidence of respiratory distress. She without wheezes. She without rales or ronchi. Musculoskeletal: Normal range of motion. Gait is normal no assitive device. Neurological: She is alert and oriented to person, place, and time. Skin: Skin is warm and dry without rash or pallor. Psychiatric: She has a normal mood and affect.  Her behavior is normal. Thought content normal.     Lab Results   Component Value Date    CREATININE 0.7 02/12/2018    CREATININE 0.9 02/10/2018    CREATININE 0.9 12/26/2015    HGB 12.7 02/11/2018    HGB 14.6 02/10/2018    HGB 13.6 12/26/2015    PROBNP 571 02/10/2018       Assessment    1. Uncontrolled hypertension          Plan:    HTN-discontinue the Norvasc and begin Nifedipine 60 mg daily. Continue Bystolic 5 mg daily. Disposition - RTC in 1 month or sooner if needed    Please do not hesitate to contact me for any questions or concerns. Sincerely yours,    Maya Li, APRN    This dictation was generated by voice recognition computer software. Although all attempts are made to edit dictation for accuracy, there may be errors in the transcription that are not intended.

## 2019-05-30 ENCOUNTER — TELEPHONE (OUTPATIENT)
Dept: CARDIOLOGY | Age: 84
End: 2019-05-30

## 2019-05-30 NOTE — TELEPHONE ENCOUNTER
Have her stop the Nifedipine if it is causing significant swelling. Have her start HCTZ 12.5 mg daily. Heart rates are normal and should not cause fatigue. She should discuss with her PCP. Have they checked labs on her for her fatigue.

## 2019-05-31 RX ORDER — HYDROCHLOROTHIAZIDE 12.5 MG/1
12.5 TABLET ORAL EVERY MORNING
Qty: 30 TABLET | Refills: 2 | Status: SHIPPED | OUTPATIENT
Start: 2019-05-31 | End: 2019-06-04 | Stop reason: DRUGHIGH

## 2019-06-04 ENCOUNTER — OFFICE VISIT (OUTPATIENT)
Dept: CARDIOLOGY | Age: 84
End: 2019-06-04
Payer: MEDICARE

## 2019-06-04 VITALS
SYSTOLIC BLOOD PRESSURE: 140 MMHG | WEIGHT: 174 LBS | DIASTOLIC BLOOD PRESSURE: 90 MMHG | HEIGHT: 69 IN | HEART RATE: 68 BPM | BODY MASS INDEX: 25.77 KG/M2

## 2019-06-04 DIAGNOSIS — I10 ESSENTIAL HYPERTENSION: Primary | ICD-10-CM

## 2019-06-04 PROCEDURE — 1123F ACP DISCUSS/DSCN MKR DOCD: CPT | Performed by: NURSE PRACTITIONER

## 2019-06-04 PROCEDURE — 1036F TOBACCO NON-USER: CPT | Performed by: NURSE PRACTITIONER

## 2019-06-04 PROCEDURE — G8417 CALC BMI ABV UP PARAM F/U: HCPCS | Performed by: NURSE PRACTITIONER

## 2019-06-04 PROCEDURE — 1090F PRES/ABSN URINE INCON ASSESS: CPT | Performed by: NURSE PRACTITIONER

## 2019-06-04 PROCEDURE — G8599 NO ASA/ANTIPLAT THER USE RNG: HCPCS | Performed by: NURSE PRACTITIONER

## 2019-06-04 PROCEDURE — 99213 OFFICE O/P EST LOW 20 MIN: CPT | Performed by: NURSE PRACTITIONER

## 2019-06-04 PROCEDURE — G8427 DOCREV CUR MEDS BY ELIG CLIN: HCPCS | Performed by: NURSE PRACTITIONER

## 2019-06-04 PROCEDURE — 4040F PNEUMOC VAC/ADMIN/RCVD: CPT | Performed by: NURSE PRACTITIONER

## 2019-06-04 RX ORDER — HYDROCHLOROTHIAZIDE 25 MG/1
25 TABLET ORAL EVERY MORNING
Qty: 30 TABLET | Refills: 1 | Status: SHIPPED | OUTPATIENT
Start: 2019-06-04 | End: 2019-06-05 | Stop reason: SDUPTHER

## 2019-06-04 NOTE — PATIENT INSTRUCTIONS
Call with BP readings in two weeks. Take Clonidine as needed for /90 or >       Completing a Blood Pressure Log    Your doctor has recommended completing a blood pressure log to see what your blood pressure runs at home. Managing your blood pressure can be very beneficial in your overall health. Listed below are a few tips and instructions on how to check your blood pressure correctly. · Always check your blood pressure AFTER taking all blood pressure medications. Waiting about 2 hours gives the best results on how your medication is working. · Before checking your blood pressure come inside, sit comfortably for 5 to 10 minutes and then check your blood pressure. Your arm should rest comfortably, sit up straight with your back against the chair, legs uncrossed and feet touching the floor. · Your blood pressure will typically run higher when you have recently been excercising, smoking, or drinking caffiene and can give inaccurate readings, try to wait 30 minutes before checking your blood pressure. · Check your blood pressure in the mornings and at night for  3 days a week and write it down. After 2 to 3 weeks of gathering readings you can call, e-mail, or fax in your results. Our office number is [phone], our fax number is [phone], and you can always e-mail us via [employer] to send your results directly to your Doctor.

## 2019-06-04 NOTE — PROGRESS NOTES
(chronic obstructive pulmonary disease) (HCC)     Diverticulosis     Essential hypertension     Gout     Mild aortic regurgitation 01/04/2016    Moderate mitral regurgitation 3/13/2014    Polyarticular arthritis     Stroke (cerebrum) ([address] 02/10/2018       Past Surgical History:   Procedure Laterality Date    BREAST BIOPSY  1992&2004    CATARACT REMOVAL      CHOLECYSTECTOMY  1996    CRANIOTOMY  2010    DIAGNOSTIC CARDIAC CATH LAB PROCEDURE      HYSTERECTOMY  1971    TUMOR REMOVAL  2009    brain tumor (meningioma)       Family History   Problem Relation Age of Onset    Coronary Art Dis Father     Heart Attack Father     Cancer Sister     Cancer Brother     Heart Attack Mother     Stroke Mother        Social History     Socioeconomic History    Marital status:      Spouse name: Not on file    Number of children: Not on file    Years of education: Not on file    Highest education level: Not on file   Occupational History     Comment: retired   Social Needs    Financial resource strain: Not on file    Food insecurity:     Worry: Not on file     Inability: Not on file   [facility] needs:     Medical: Not on file     Non-medical: Not on file   Tobacco Use    Smoking status: Former Smoker     Packs/day: 1.00     Years: 8.00     Pack years: 8.00    Smokeless tobacco: Never Used    Tobacco comment: Quit 40+ years ago.    Substance and Sexual Activity    Alcohol use: No     Comment: occasional    Drug use: No    Sexual activity: Not on file   Lifestyle    Physical activity:     Days per week: Not on file     Minutes per session: Not on file    Stress: Not on file   Relationships    Social connections:     Talks on phone: Not on file     Gets together: Not on file     Attends Je[employer]ah's witness service: Not on file     Active member of club or organization: Not on file     Attends meetings of clubs or organizations: Not on file     Relationship status: Not on file    Intimate partner violence:     Fear of current or ex partner: Not on file     Emotionally abused: Not on file     Physically abused: Not on file     Forced sexual activity: Not on file   Other Topics Concern    Not on file   Social History Narrative    Not on file       Allergies   Allergen Reactions    Aliskiren Other (See Comments)     No reaction noted    Ciprofibrate     Ciprofloxacin Other (See Comments)     No reaction noted    Codeine     Sulfa Antibiotics          Current Outpatient Medications:     hydrochlorothiazide (HYDRODIURIL) 25 MG tablet, Take 1 tablet by mouth every morning, Disp: 30 tablet, Rfl: 1    cloNIDine (CATAPRES) 0.1 MG tablet, Take 1 tablet by mouth 2 times daily as needed for High Blood Pressure (for BP > 160/90), Disp: 60 tablet, Rfl: 3    BYSTOLIC 5 MG tablet, TAKE 1 TABLET BY MOUTH ONCE DAILY. , Disp: 30 tablet, Rfl: 5    aspirin 325 MG tablet, Take 1 tablet by mouth daily, Disp: 30 tablet, Rfl: 3    allopurinol (ZYLOPRIM) 100 MG tablet, Take 100 mg by mouth daily. , Disp: , Rfl:     PE:  Vitals:    06/04/19 1505   BP: (!) 140/90   Pulse:        Estimated body mass index is 25.7 kg/m² as calculated from the following:    Height as of this encounter: 5' 9\" (1.753 m). Weight as of this encounter: 174 lb (78.9 kg). Constitutional: She is oriented to person, place, and time. She appears well-developed and well-nourished in no acute distress. Head: Normocephalic and atraumatic. Neck:  Neck supple without JVD present. Cardiovascular: Normal rate, regular rhythm, normal heart sounds. no murmur ascultated. No gallop and no friction rub.  no carotid bruits. no peripheral edema. Pulmonary/Chest:  Lungs clear to auscultation bilaterally without evidence of respiratory distress. She without wheezes. She without rales or ronchi. Musculoskeletal: Normal range of motion. Gait is normal no assitive device. Neurological: She is alert and oriented to person, place, and time.    Skin: Skin is warm and dry without rash or pallor. Psychiatric: She has a normal mood and affect. Her behavior is normal. Thought content normal.     Lab Results   Component Value Date    CREATININE 0.7 02/12/2018    CREATININE 0.9 02/10/2018    CREATININE 0.9 12/26/2015    HGB 12.7 02/11/2018    HGB 14.6 02/10/2018    HGB 13.6 12/26/2015    PROBNP 571 02/10/2018       Assessment    1. Essential hypertension          Plan:    HTN-increase HCTZ to 25 mg daily, continue the Bystolic. HR in the 60s. Sees Dr. David Villeda in the next two weeks and will get labs at that time. Call with readings in 2 weeks or if you have to take clonidine. Disposition - RTC in 1 month or sooner if needed    Please do not hesitate to contact me for any questions or concerns. Sincerely yours,    Chere Goldberg, APRN    This dictation was generated by voice recognition computer software. Although all attempts are made to edit dictation for accuracy, there may be errors in the transcription that are not intended.

## 2019-06-05 RX ORDER — HYDROCHLOROTHIAZIDE 25 MG/1
25 TABLET ORAL EVERY MORNING
Qty: 30 TABLET | Refills: 5 | Status: SHIPPED | OUTPATIENT
Start: 2019-06-05 | End: 2019-09-06 | Stop reason: SDUPTHER

## 2019-06-08 ENCOUNTER — HOSPITAL ENCOUNTER (EMERGENCY)
Age: 84
Discharge: HOME | End: 2019-06-08
Payer: MEDICARE

## 2019-06-08 VITALS
TEMPERATURE: 97.6 F | HEIGHT: 69 IN | BODY MASS INDEX: 24.88 KG/M2 | OXYGEN SATURATION: 96 % | WEIGHT: 168 LBS | SYSTOLIC BLOOD PRESSURE: 168 MMHG | HEART RATE: 56 BPM | DIASTOLIC BLOOD PRESSURE: 91 MMHG | RESPIRATION RATE: 17 BRPM

## 2019-06-08 DIAGNOSIS — I10 ESSENTIAL HYPERTENSION: Primary | ICD-10-CM

## 2019-06-08 PROCEDURE — 93005 ELECTROCARDIOGRAM TRACING: CPT

## 2019-06-08 PROCEDURE — 99282 EMERGENCY DEPT VISIT SF MDM: CPT | Performed by: NURSE PRACTITIONER

## 2019-06-08 PROCEDURE — 99283 EMERGENCY DEPT VISIT LOW MDM: CPT

## 2019-06-08 ASSESSMENT — ENCOUNTER SYMPTOMS: ABDOMINAL PAIN: 0

## 2019-06-08 NOTE — ED PROVIDER NOTES
[facility]  eMERGENCY dEPARTMENT eNCOUnter      Pt Name: Jerman Rosas  MRN: [id number]  [place] 3/26/1933  Date of evaluation: 6/8/2019  Provider: NATHALIE Verma    CHIEF COMPLAINT       Chief Complaint   Patient presents with    Hypertension     214/100 at home. pt took a clonidine around midnight and retook it and SBP was still in the 200's. HISTORY OF PRESENT ILLNESS   (Location/Symptom, Timing/Onset,Context/Setting, Quality, Duration, Modifying Factors, Severity)  Note limiting factors. Jerman Rosas is a 80 y.o. female who presents to the emergency department  with elevated blood pressure. She states she was taken off her Norvasc and placed on HCTZ. She states that is not helping her blood pressure. Her provider doubled the dose of HCTZ and gave her clonidine to use when necessary. She came home from being out tonight and decided to take her blood pressure and was surprised that was 214/100. She denies any chest pain she denies any headache. She's not had a change in vision. this was concerning for her because she had a stroke last year. She waited several hours and was still elevated so she came in    The history is provided by the patient. Hypertension   Severity:  Moderate  Onset quality:  Sudden  Duration:  2 hours  Timing:  Constant  Progression:  Unchanged  Chronicity:  New  Relieved by:  Central alpha agonists  Associated symptoms: no abdominal pain, no fever and no headaches    Risk factors: prior stroke        NursingNotes were reviewed. REVIEW OF SYSTEMS    (2-9 systems for level 4, 10 or more for level 5)     Review of Systems   Constitutional: Negative for fever. Gastrointestinal: Negative for abdominal pain. Neurological: Negative for headaches. Except as noted above the remainder of the review of systems was reviewed and negative.        PAST MEDICAL HISTORY     Past Medical History:   Diagnosis Date    Arthralgia     Brain tumor [facility])     meningioma removed 2009    COPD (chronic obstructive pulmonary disease) (HCC)     Diverticulosis     Essential hypertension     Gout     Mild aortic regurgitation 01/04/2016    Moderate mitral regurgitation 3/13/2014    Polyarticular arthritis     Stroke (cerebrum) ([address] 02/10/2018         SURGICALHISTORY       Past Surgical History:   Procedure Laterality Date    BREAST BIOPSY  1992&2004    CATARACT REMOVAL      CHOLECYSTECTOMY  1996    CRANIOTOMY  2010    DIAGNOSTIC CARDIAC CATH LAB PROCEDURE      HYSTERECTOMY  1971    TUMOR REMOVAL  2009    brain tumor (meningioma)         CURRENT MEDICATIONS       Previous Medications    ALLOPURINOL (ZYLOPRIM) 100 MG TABLET    Take 100 mg by mouth daily. ASPIRIN 325 MG TABLET    Take 1 tablet by mouth daily    BYSTOLIC 5 MG TABLET    TAKE 1 TABLET BY MOUTH ONCE DAILY. CLONIDINE (CATAPRES) 0.1 MG TABLET    Take 1 tablet by mouth 2 times daily as needed for High Blood Pressure (for BP > 160/90)    HYDROCHLOROTHIAZIDE (HYDRODIURIL) 25 MG TABLET    Take 1 tablet by mouth every morning       ALLERGIES     Aliskiren; Ciprofibrate; Ciprofloxacin; Codeine; and Sulfa antibiotics    FAMILY HISTORY       Family History   Problem Relation Age of Onset    Coronary Art Dis Father     Heart Attack Father     Cancer Sister     Cancer Brother     Heart Attack Mother     Stroke Mother           SOCIAL HISTORY       Social History     Socioeconomic History    Marital status:       Spouse name: None    Number of children: None    Years of education: None    Highest education level: None   Occupational History     Comment: retired   Social Needs    Financial resource strain: None    Food insecurity:     Worry: None     Inability: None    Transportation needs:     Medical: None     Non-medical: None   Tobacco Use    Smoking status: Former Smoker     Packs/day: 1.00     Years: 8.00     Pack years: 8.00    Smokeless tobacco: Never Used    Tobacco comment: Quit 40+ years ago. Substance and Sexual Activity    Alcohol use: No     Comment: occasional    Drug use: No    Sexual activity: None   Lifestyle    Physical activity:     Days per week: None     Minutes per session: None    Stress: None   Relationships    Social connections:     Talks on phone: None     Gets together: None     Attends [employer] service: None     Active member of club or organization: None     Attends meetings of clubs or organizations: None     Relationship status: None    Intimate partner violence:     Fear of current or ex partner: None     Emotionally abused: None     Physically abused: None     Forced sexual activity: None   Other Topics Concern    None   Social History Narrative    None       SCREENINGS    Siri Coma Scale  Eye Opening: Spontaneous  Best Verbal Response: Oriented  Best Motor Response: Obeys commands  Siri Coma Scale Score: 15 @FLOW([id number])@      PHYSICAL EXAM    (up to 7 for level 4, 8 or more for level 5)     ED Triage Vitals   BP Temp Temp Source Pulse Resp SpO2 Height Weight   06/08/19 0117 06/08/19 0115 06/08/19 0115 06/08/19 0115 06/08/19 0115 06/08/19 0115 06/08/19 0112 06/08/19 0112   (!) 204/111 97.8 °F (36.6 °C) Oral 72 16 95 % 5' 9\" (1.753 m) 168 lb (76.2 kg)       Physical Exam   Constitutional: She is oriented to person, place, and time. She appears well-developed and well-nourished. HENT:   Head: Normocephalic and atraumatic. Eyes: Right eye exhibits no discharge. Left eye exhibits no discharge. No scleral icterus. Neck: Normal range of motion. Neck supple. Cardiovascular: Normal rate, regular rhythm and normal heart sounds. Pulmonary/Chest: Effort normal and breath sounds normal. No respiratory distress. Neurological: She is alert and oriented to person, place, and time. Psychiatric: She has a normal mood and affect. Her behavior is normal.   Nursing note and vitals reviewed.       DIAGNOSTIC RESULTS     EKG: All EKG's are interpreted by the APRN  06/08/19 [id number]

## 2019-06-09 LAB
EKG P AXIS: 62 DEGREES
EKG P-R INTERVAL: 208 MS
EKG Q-T INTERVAL: 458 MS
EKG QRS DURATION: 80 MS
EKG QTC CALCULATION (BAZETT): 453 MS
EKG T AXIS: 61 DEGREES

## 2019-06-12 ENCOUNTER — OFFICE VISIT (OUTPATIENT)
Dept: CARDIOLOGY | Age: 84
End: 2019-06-12
Payer: MEDICARE

## 2019-06-12 VITALS
DIASTOLIC BLOOD PRESSURE: 90 MMHG | HEART RATE: 60 BPM | WEIGHT: 175 LBS | SYSTOLIC BLOOD PRESSURE: 142 MMHG | HEIGHT: 69 IN | BODY MASS INDEX: 25.92 KG/M2

## 2019-06-12 DIAGNOSIS — I34.0 MODERATE MITRAL REGURGITATION: ICD-10-CM

## 2019-06-12 DIAGNOSIS — I10 ESSENTIAL HYPERTENSION: Primary | ICD-10-CM

## 2019-06-12 DIAGNOSIS — I35.1 MILD AORTIC INSUFFICIENCY: ICD-10-CM

## 2019-06-12 DIAGNOSIS — I47.1 PSVT (PAROXYSMAL SUPRAVENTRICULAR TACHYCARDIA) (HCC): ICD-10-CM

## 2019-06-12 PROCEDURE — 99213 OFFICE O/P EST LOW 20 MIN: CPT | Performed by: NURSE PRACTITIONER

## 2019-06-12 PROCEDURE — 4040F PNEUMOC VAC/ADMIN/RCVD: CPT | Performed by: NURSE PRACTITIONER

## 2019-06-12 PROCEDURE — 1123F ACP DISCUSS/DSCN MKR DOCD: CPT | Performed by: NURSE PRACTITIONER

## 2019-06-12 PROCEDURE — 1090F PRES/ABSN URINE INCON ASSESS: CPT | Performed by: NURSE PRACTITIONER

## 2019-06-12 PROCEDURE — G8599 NO ASA/ANTIPLAT THER USE RNG: HCPCS | Performed by: NURSE PRACTITIONER

## 2019-06-12 PROCEDURE — 1036F TOBACCO NON-USER: CPT | Performed by: NURSE PRACTITIONER

## 2019-06-12 PROCEDURE — G8417 CALC BMI ABV UP PARAM F/U: HCPCS | Performed by: NURSE PRACTITIONER

## 2019-06-12 PROCEDURE — G8427 DOCREV CUR MEDS BY ELIG CLIN: HCPCS | Performed by: NURSE PRACTITIONER

## 2019-06-12 NOTE — PATIENT INSTRUCTIONS
Increase Bystolic 5 mg to (1) tab twice daily. Continue other current medications as prescribed. Continue to follow up with primary care provider for non cardiac medical problems. Call the office with any problems, questions or concerns at [phone]. Follow up as scheduled with your cardiologist. As scheduled. The following educational material has been included in this after visit summary for your review: Life simple 7. Heart health. Hypertension.     Additional instructions:  Coronary artery disease risk factors you can control: Smoking, high blood pressure, high cholesterol, diabetes, being overweight, lack of exercise and stress. Continue heart healthy diet. Take medications as directed. Exercise as tolerated. Strive for 15 minutes of exercise most days of the week. If asked to keep a blood pressure log, do so for 2 weeks. Call the office to report readings at [phone]. Blood pressure goal  is less than 120/70. Elevated blood pressure at 120-129/80 or less. High blood pressure at 130-139/80-89. If you are taking cholesterol lowering medications, it is recommended that lab work be checked annually. Always keep a current medication list. Bring your medications to every office visit. Life simple 7  1) Manage blood pressure - high blood pressure is a major risk factor for heart disease and stroke. Keeping blood pressure in health range reduces strain on your heart, arteries and kidneys. 2) Control cholesterol - contributes to plaque, which can clog arteries and lead to heart disease and stroke. When you control your cholesterol you are giving your arteries their best chance to remain clear. 3) Reduce blood sugar - most of the food we eat is turning into glucose or blood sugar that our body uses for energy. Over time, high levels of blood sugar can damage your heart, kidneys, eyes and nerves.   4) Get active - living an active life is one of the most rewarding gifts you can give yourself and those you love. Simply put, daily physical activity increases your length and quality of life. 5)  Eat better - A healthy diet is one of your best weapons for fighting cardiovascular disease. When you eat a heart healthy diet, you improve your chances for feeling good and staying healthy for life. 6)  Lose weight - when you shed extra fat an unnecessary pounds, you reduce the burden on your hear, lungs, blood vessels and skeleton. You give yourself the gift of active living, you lower your blood pressure and help yourself feel better. 7) Stop smoking - cigarette smokers have a higher risk of developing cardiovascular disease. If  You smoke, quitting is the best thing you can do for your health. Check American Heart Association on line for more information on Life's Simple 7 and tips for healthy living.       Patient Education        Learning About High Blood Pressure  What is high blood pressure? Blood pressure is a measure of how hard the blood pushes against the walls of your arteries. It's normal for blood pressure to go up and down throughout the day, but if it stays up, you have high blood pressure. Another name for high blood pressure is hypertension. Two numbers tell you your blood pressure. The first number is the systolic pressure. It shows how hard the blood pushes when your heart is pumping. The second number is the diastolic pressure. It shows how hard the blood pushes between heartbeats, when your heart is relaxed and filling with blood. Your doctor will give you a goal for your blood pressure. Your goal will be based on your health and your age. High blood pressure (hypertension) means that the top number stays high, or the bottom number stays high, or both. High blood pressure increases the risk of stroke, heart attack, and other problems. You and your doctor will talk about your risks of these problems based on your blood pressure.   What happens when you have high blood pressure? · Blood flows through your arteries with too much force. Over time, this damages the walls of your arteries. But you can't feel it. High blood pressure usually doesn't cause symptoms. · Fat and calcium start to build up in your arteries. This buildup is called plaque. Plaque makes your arteries narrower and stiffer. Blood can't flow through them as easily. · This lack of good blood flow starts to damage some of the organs in your body. This can lead to problems such as coronary artery disease and heart attack, heart failure, stroke, kidney failure, and eye damage. How can you prevent high blood pressure? · Stay at a healthy weight. · Try to limit how much sodium you eat to less than 2,300 milligrams (mg) a day. If you limit your sodium to 1,500 mg a day, you can lower your blood pressure even more. ? Buy foods that are labeled \"unsalted,\" \"sodium-free,\" or \"low-sodium. \" Foods labeled \"reduced-sodium\" and \"light sodium\" may still have too much sodium. ? Flavor your food with garlic, lemon juice, onion, vinegar, herbs, and spices instead of salt. Do not use soy sauce, steak sauce, onion salt, garlic salt, mustard, or ketchup on your food. ? Use less salt (or none) when recipes call for it. You can often use half the salt a recipe calls for without losing flavor. · Be physically active. Get at least 30 minutes of exercise on most days of the week. Walking is a good choice. You also may want to do other activities, such as running, swimming, cycling, or playing tennis or team sports. · Limit alcohol to 2 drinks a day for men and 1 drink a day for women. · Eat plenty of fruits, vegetables, and low-fat dairy products. Eat less saturated and total fats. How is high blood pressure treated? · Your doctor will suggest making lifestyle changes to help your heart. For example, your doctor may ask you to eat healthy foods, quit smoking, lose extra weight, and be more active.   · If lifestyle changes don't help enough, your doctor may recommend that you take medicine. · When blood pressure is very high, medicines are needed to lower it. Follow-up care is a key part of your treatment and safety. Be sure to make and go to all appointments, and call your doctor if you are having problems. It's also a good idea to know your test results and keep a list of the medicines you take. Where can you learn more? Go to https://[email or web address].comeb.AutoRef.com. org and sign in to your [employer] account. Enter P501 in the [employer] box to learn more about \"Learning About High Blood Pressure. \"     If you do not have an account, please click on the \"Sign Up Now\" link. Current as of: July 22, 2018  Content Version: 12.0  © 6855-7973 [employer]. Care instructions adapted under license by [facility] ([facility]). If you have questions about a medical condition or this instruction, always ask your healthcare professional. [place] [address] any warranty or liability for your use of this information.

## 2019-06-12 NOTE — PROGRESS NOTES
[facility] [place], [place]. [address], [place] [address] [facility]  [phone] office  [phone] fax      OFFICE VISIT:  6/12/2019    [address]: 3/26/1933    Reason For Visit:  Yang Marquez is a 80 y.o. female who is here for Follow-Up from Hospital (Patient was in ED on 06/08/2019 for HTN. BP still remains high) and Hypertension  Patient followed for:  Essential hypertension    PSVT (paroxysmal supraventricular tachycardia) ([facility])    Mild aortic insufficiency    Moderate mitral regurgitation      The patient presents today with BP control issues. She has been able to take amlodipine and nifedipine in the past due to swelling and increased heart rate. She is currently taking Bystolic 5 mg daily, HCTZ 25 mg daily and Clonidine 0.1 mg prn. Otherwise, the patient is doing well from a cardiac standpoint without symptoms to suggest myocardial ischemia. No problems with anemia. The patient's PCP monitors cholesterol. Esteban Castleman denies exertional chest pain, shortness of breath, orthopnea, paroxysmal nocturnal dyspnea, syncope, presyncope, sustained arrythmia, edema and fatigue. The patient denies numbness or weakness to suggest cerebrovascular accident or transient ischemic attack.       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 I47.1    Leg fatigue R29.898     Past Medical History:   Diagnosis Date    Arthralgia     Brain tumor ([facility]     meningioma removed 2009    COPD (chronic obstructive pulmonary disease) ([facility]     Diverticulosis     Essential hypertension     Gout     Mild aortic regurgitation 01/04/2016    Moderate mitral regurgitation 3/13/2014    Polyarticular arthritis     Stroke (cerebrum) ([facility] 02/10/2018     Past Surgical History:   Procedure Laterality Date    BREAST BIOPSY  1992&2004    CATARACT REMOVAL      CHOLECYSTECTOMY  1996    CRANIOTOMY  2010    DIAGNOSTIC CARDIAC CATH LAB PROCEDURE      HYSTERECTOMY  1971    TUMOR REMOVAL  2009    brain tumor (meningioma)     Family History   Problem Relation Age of Onset    Coronary Art Dis Father     Heart Attack Father     Cancer Sister     Heart Attack Mother     Stroke Mother     Cancer Sister     Cancer Sister      Social History     Tobacco Use    Smoking status: Former Smoker     Packs/day: 1.00     Years: 8.00     Pack years: 8.00    Smokeless tobacco: Never Used    Tobacco comment: Quit 40+ years ago. Substance Use Topics    Alcohol use: No     Comment: occasional      Current Outpatient Medications   Medication Sig Dispense Refill    hydrochlorothiazide (HYDRODIURIL) 25 MG tablet Take 1 tablet by mouth every morning 30 tablet 5    cloNIDine (CATAPRES) 0.1 MG tablet Take 1 tablet by mouth 2 times daily as needed for High Blood Pressure (for BP > 160/90) 60 tablet 3    BYSTOLIC 5 MG tablet TAKE 1 TABLET BY MOUTH ONCE DAILY. 30 tablet 5    aspirin 325 MG tablet Take 1 tablet by mouth daily 30 tablet 3    allopurinol (ZYLOPRIM) 100 MG tablet Take 100 mg by mouth daily. No current facility-administered medications for this visit.       Allergies: Aliskiren; Amlodipine; Ciprofibrate; Ciprofloxacin; Codeine; and Sulfa antibiotics    Review of Systems  Constitutional - no appetite change, or unexpected weight change. No fever, chills or diaphoresis. No significant change in activity level or new onset of fatigue. HEENT - no significant rhinorrhea or epistaxis. No tinnitus or significant hearing loss. Eyes - no sudden vision change or amaurosis. No corneal arcus, xantholasma, subconjunctival hemorrhage or discharge. Respiratory - no significant wheezing, stridor, apnea or cough. No dyspnea on exertion or shortness of air. Cardiovascular - no exertional chest pain to suggest myocardial ischemia. No orthopnea or PND. No sensation of sustained arrythmia. No occurrence of slow heart rate. No palpitations. No claudication. No leg edema. Gastrointestinal - no abdominal swelling or pain. No blood in stool. No severe constipation, diarrhea, nausea, or vomiting. Genitourinary - no dysuria, frequency, or urgency. No flank pain or hematuria. Musculoskeletal - no back pain or myalgia. No problems with gait. Extremities - no clubbing, cyanosis or edema. Skin - no color change or rash. No pallor. No new surgical incision. Neurologic - no speech difficulty, facial asymmetry or lateralizing weakness. No seizures, presyncope or syncope. No significant dizziness. Hematologic - no easy bruising or excessive bleeding. Psychiatric - no severe anxiety or insomnia. No confusion. All other review of systems are negative. Objective  Vital Signs - BP (!) 142/90   Pulse 60   Ht 5' 9\" (1.753 m)   Wt 175 lb (79.4 kg)   BMI 25.84 kg/m²   General - [facility] is alert, cooperative, and pleasant. Well groomed. No acute distress. Body habitus - Body mass index is 25.84 kg/m². HEENT - Head is normocephalic. No circumoral cyanosis. Dentition is normal.  EYES -   Lids normal without ptosis. No discharge, edema or subconjunctival hemorrhage. Neck - Symmetrical without apparent mass or lymphadenopathy.    Respiratory - Normal respiratory effort without use of accessory muscles. Ausculatation reveals vesicular breath sounds without crackles, wheezes, rub or rhonchi. Cardiovascular - No jugular venous distention. Auscultation reveals regular rate and rhythm. No audible clicks, gallop or rub. No murmur. No lower extremity varicosities. No carotid bruits. Abdominal -  No visible distention, mass or pulsations. Extremities - No clubbing or cyanosis. No statis dermatitis or ulcers. No edema. Musculoskeletal -   No Osler's nodes. No kyphosis or scoliosis. Gait is even and regular without limp or shuffle. Ambulates without assistance. Skin -  Warm and dry; no rash or pallor. No new surgical wound. Neurological - No focal neurological deficits. Thought processes coherent. No apparent tremor. Oriented to person, place and time. Psychiatric -  Appropriate affect and mood. Assessment:     Diagnosis Orders   1. Essential hypertension     2. PSVT (paroxysmal supraventricular tachycardia) ([address]     3. Mild aortic insufficiency     4. Moderate mitral regurgitation       Stable CV status without symptoms of overt heart failure, arrhythmia or angina. BP elevated on current regimen. Increase Bystolic to 5 mg BID. Decrease HCTZ to 25 mg 1/2 daily. Continue Clonidine prn. Log BP and call back in one week. Patient is compliant with medication regimen.     BP Readings from Last 3 Encounters:   06/12/19 (!) 142/90   06/08/19 (!) 168/91   06/04/19 (!) 140/90    Pulse Readings from Last 3 Encounters:   06/12/19 60   06/08/19 56   06/04/19 68        Wt Readings from Last 3 Encounters:   06/12/19 175 lb (79.4 kg)   06/08/19 168 lb (76.2 kg)   06/04/19 174 lb (78.9 kg)       Recent Results (from the past 1008 hour(s))   EKG 12 Lead    Collection Time: 06/08/19  2:26 AM   Result Value Ref Range    P-R Interval 208 ms    QRS Duration 80 ms    Q-T Interval 458 ms    QTc Calculation (Bazett) 453 ms    P Axis 62 degrees

## 2019-06-26 ENCOUNTER — OFFICE VISIT (OUTPATIENT)
Dept: PULMONOLOGY | Facility: CLINIC | Age: 84
End: 2019-06-26

## 2019-06-26 VITALS
OXYGEN SATURATION: 97 % | SYSTOLIC BLOOD PRESSURE: 138 MMHG | HEIGHT: 69 IN | BODY MASS INDEX: 25.92 KG/M2 | HEART RATE: 70 BPM | WEIGHT: 175 LBS | DIASTOLIC BLOOD PRESSURE: 80 MMHG

## 2019-06-26 DIAGNOSIS — J41.0 SIMPLE CHRONIC BRONCHITIS (HCC): Primary | ICD-10-CM

## 2019-06-26 DIAGNOSIS — J44.9 STAGE 2 MODERATE COPD BY GOLD CLASSIFICATION (HCC): ICD-10-CM

## 2019-06-26 PROBLEM — J41.8 MIXED SIMPLE AND MUCOPURULENT CHRONIC BRONCHITIS (HCC): Status: ACTIVE | Noted: 2019-06-26

## 2019-06-26 LAB
FEV1/FVC: NORMAL %
FEV1: NORMAL LITERS
FVC VOL RESPIRATORY: NORMAL LITERS

## 2019-06-26 PROCEDURE — 94010 BREATHING CAPACITY TEST: CPT | Performed by: INTERNAL MEDICINE

## 2019-06-26 PROCEDURE — 99213 OFFICE O/P EST LOW 20 MIN: CPT | Performed by: INTERNAL MEDICINE

## 2019-06-26 RX ORDER — HYDROCHLOROTHIAZIDE 12.5 MG/1
12.5 CAPSULE, GELATIN COATED ORAL DAILY
Refills: 1 | COMMUNITY
Start: 2019-05-31

## 2019-06-26 RX ORDER — CLONIDINE HYDROCHLORIDE 0.1 MG/1
0.1 TABLET ORAL 2 TIMES DAILY PRN
COMMUNITY
Start: 2019-05-06 | End: 2019-11-15

## 2019-06-26 NOTE — PATIENT INSTRUCTIONS
The patient is doing much better at this time.  Her pulmonary function studies show improvement as well.  I will see her back in follow-up in about 4 months.

## 2019-06-27 NOTE — PROGRESS NOTES
Subjective   Angelia Munoz is a 86 y.o. female.     Chief Complaint   Patient presents with   • COPD      No My Sticky Note on file.    History of Present Illness   The patient had a recent exacerbation for COPD comes today for follow-up.  She is doing much better at this time.    Medical/Family/Social History   has a past medical history of Stroke (CMS/[facility]).   has a past surgical history that includes Gallbladder surgery; Hysterectomy; Craniotomy; Breast biopsy; and Eye surgery.  family history is not on file.   reports that she has never smoked. She has never used smokeless tobacco. She reports that she drinks alcohol. She reports that she does not use drugs.  Allergies   Allergen Reactions   • Ciprofloxacin Other (See Comments)     No reaction noted   • Codeine Other (See Comments)     No reaction noted   • Sulfa Antibiotics Other (See Comments)     No reaction noted   • Tekturna [Aliskiren] Other (See Comments)     No reaction noted     Medications    Current Outpatient Medications:   •  allopurinol (ZYLOPRIM) 100 MG tablet, Take 100 mg by mouth Daily., Disp: , Rfl:   •  amLODIPine (NORVASC) 2.5 MG tablet, Take 5 mg by mouth Daily., Disp: , Rfl:   •  aspirin  MG tablet, Take 325 mg by mouth Daily., Disp: , Rfl:   •  CloNIDine (CATAPRES) 0.1 MG tablet, Take 0.1 mg by mouth., Disp: , Rfl:   •  hydrochlorothiazide (MICROZIDE) 12.5 MG capsule, , Disp: , Rfl: 1  •  nebivolol (BYSTOLIC) 10 MG tablet, Take 5 mg by mouth Daily., Disp: , Rfl:   •  O2 (OXYGEN), Inhale 2 L/min Every Night., Disp: , Rfl:   •  tiotropium bromide-olodaterol (STIOLTO RESPIMAT) 2.5-2.5 MCG/ACT aerosol solution inhaler, Inhale 1 puff Daily., Disp: 4 inhaler, Rfl: 0    Review of Systems   Constitutional: Negative for chills and fever.   HENT: Negative for congestion.    Eyes: Negative for visual disturbance.   Respiratory: Negative for cough and shortness of breath.    Cardiovascular: Negative for chest pain.   Gastrointestinal: Negative  "for diarrhea, nausea and vomiting.   Genitourinary: Negative for difficulty urinating.   Musculoskeletal: Negative for arthralgias.   Skin: Negative for rash.   Neurological: Negative for dizziness and speech difficulty.   Hematological: Negative for adenopathy.   Psychiatric/Behavioral: The patient is not nervous/anxious.        Objective   /80   Pulse 70   Ht 175.3 cm (69\")   Wt 79.4 kg (175 lb)   SpO2 97% Comment: RA  Breastfeeding? No   BMI 25.84 kg/m²   Physical Exam   Constitutional: She is oriented to person, place, and time. She appears well-developed and well-nourished.   HENT:   Head: Normocephalic and atraumatic.   Eyes: EOM are normal. Pupils are equal, round, and reactive to light.   Neck: Normal range of motion. Neck supple.   Cardiovascular: Normal rate, regular rhythm and normal heart sounds.   Pulmonary/Chest: Effort normal and breath sounds normal.   Abdominal: Soft.   Musculoskeletal: Normal range of motion.   Neurological: She is alert and oriented to person, place, and time.   Skin: Skin is warm and dry.   Psychiatric: She has a normal mood and affect.   Nursing note and vitals reviewed.          Pulmonary Functions Testing Results:  FEV1   Date Value Ref Range Status   06/26/2019 60% liters Final     FVC   Date Value Ref Range Status   06/26/2019 67% liters Final     FEV1/FVC   Date Value Ref Range Status   06/26/2019 66.35% % Final      My interpretation of PFT:  1.  Spirometry is consistent with a moderate obstructive ventilatory defect.  2.  When current studies are compared to studies from March of this year she has had significant improvement in both her FVC and FEV1 compared to previous.  Assessment/Plan   Angelia was seen today for copd.    Diagnoses and all orders for this visit:    Simple chronic bronchitis (CMS/[facility])    Stage 2 moderate COPD by GOLD classification (CMS/[facility])  -     Pulmonary Function Test      Patient's Body mass index is 25.84 kg/m². BMI is within normal " parameters. No follow-up required..      The patient has recovered nicely from her recent episode of acute bronchitis causing an exacerbation of her COPD.  I will see her back in several months.

## 2019-07-03 ENCOUNTER — OFFICE VISIT (OUTPATIENT)
Dept: CARDIOLOGY | Age: 84
End: 2019-07-03
Payer: MEDICARE

## 2019-07-03 VITALS
BODY MASS INDEX: 26.07 KG/M2 | HEIGHT: 69 IN | WEIGHT: 176 LBS | DIASTOLIC BLOOD PRESSURE: 60 MMHG | HEART RATE: 66 BPM | SYSTOLIC BLOOD PRESSURE: 130 MMHG

## 2019-07-03 DIAGNOSIS — I34.0 MODERATE MITRAL REGURGITATION: ICD-10-CM

## 2019-07-03 DIAGNOSIS — I10 ESSENTIAL HYPERTENSION: Primary | ICD-10-CM

## 2019-07-03 DIAGNOSIS — I47.1 PSVT (PAROXYSMAL SUPRAVENTRICULAR TACHYCARDIA) (HCC): ICD-10-CM

## 2019-07-03 PROCEDURE — 1036F TOBACCO NON-USER: CPT | Performed by: NURSE PRACTITIONER

## 2019-07-03 PROCEDURE — 1090F PRES/ABSN URINE INCON ASSESS: CPT | Performed by: NURSE PRACTITIONER

## 2019-07-03 PROCEDURE — 4040F PNEUMOC VAC/ADMIN/RCVD: CPT | Performed by: NURSE PRACTITIONER

## 2019-07-03 PROCEDURE — G8599 NO ASA/ANTIPLAT THER USE RNG: HCPCS | Performed by: NURSE PRACTITIONER

## 2019-07-03 PROCEDURE — 99213 OFFICE O/P EST LOW 20 MIN: CPT | Performed by: NURSE PRACTITIONER

## 2019-07-03 PROCEDURE — G8427 DOCREV CUR MEDS BY ELIG CLIN: HCPCS | Performed by: NURSE PRACTITIONER

## 2019-07-03 PROCEDURE — G8417 CALC BMI ABV UP PARAM F/U: HCPCS | Performed by: NURSE PRACTITIONER

## 2019-07-03 PROCEDURE — 1123F ACP DISCUSS/DSCN MKR DOCD: CPT | Performed by: NURSE PRACTITIONER

## 2019-07-03 RX ORDER — NEBIVOLOL 5 MG/1
5 TABLET ORAL 2 TIMES DAILY
COMMUNITY
End: 2019-07-09 | Stop reason: SDUPTHER

## 2019-07-03 RX ORDER — OLMESARTAN MEDOXOMIL 20 MG/1
20 TABLET, FILM COATED ORAL 2 TIMES DAILY
Qty: 60 TABLET | Refills: 5 | Status: SHIPPED | OUTPATIENT
Start: 2019-07-03 | End: 2020-01-07

## 2019-07-03 NOTE — PROGRESS NOTES
[facility] [place], [place]. [address], [place] [address] [facility]  [phone] office  [phone] fax      OFFICE VISIT:  7/3/2019    [address]: 3/26/1933    Reason For Visit:  Guillermina Cordova is a 80 y.o. female who is here for 1 Month Follow-Up (follow up on HTN. Still having to take Clonidine. BP elevates in the pm hrs.) and Hypertension  History:  Essential hypertension    Moderate mitral regurgitation    PSVT (paroxysmal supraventricular tachycardia) ([facility])      The patient presents today for cardiology follow up regarding HTN after increasing Bystolic 2 weeks ago. BP continues to be elevated. BP log reviewed. Continues to take Clonidine prn. Overall, the patient is doing well from a cardiac standpoint without symptoms to suggest myocardial ischemia. .  The patient's PCP monitors cholesterol. Amita Gravely denies exertional chest pain, shortness of breath, orthopnea, paroxysmal nocturnal dyspnea, syncope, presyncope, sustained arrythmia, edema and fatigue. The patient denies numbness or weakness to suggest cerebrovascular accident or transient ischemic attack.       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 Call the office with any problems, questions or concerns at [phone]. Follow up as scheduled with your cardiologist. 2 weeks. The following educational material has been included in this after visit summary for your review: Life simple 7. Hypertension.     Additional instructions:  Coronary artery disease risk factors you can control: Smoking, high blood pressure, high cholesterol, diabetes, being overweight, lack of exercise and stress. Continue heart healthy diet. Take medications as directed. Exercise as tolerated. Strive for 15 minutes of exercise most days of the week. If asked to keep a blood pressure log, do so for 2 weeks. Call the office to report readings at [phone]. Blood pressure goal  is less than 120/70. Elevated blood pressure at 120-129/80 or less. High blood pressure at 130-139/80-89. If you are taking cholesterol lowering medications, it is recommended that lab work be checked annually. Always keep a current medication list. Bring your medications to every office visit.       NATHALIE Zamora

## 2019-07-03 NOTE — PATIENT INSTRUCTIONS
daily physical activity increases your length and quality of life. 5)  Eat better - A healthy diet is one of your best weapons for fighting cardiovascular disease. When you eat a heart healthy diet, you improve your chances for feeling good and staying healthy for life. 6)  Lose weight - when you shed extra fat an unnecessary pounds, you reduce the burden on your hear, lungs, blood vessels and skeleton. You give yourself the gift of active living, you lower your blood pressure and help yourself feel better. 7) Stop smoking - cigarette smokers have a higher risk of developing cardiovascular disease. If  You smoke, quitting is the best thing you can do for your health. Check American Heart Association on line for more information on Life's Simple 7 and tips for healthy living.       Patient Education        Learning About High Blood Pressure  What is high blood pressure? Blood pressure is a measure of how hard the blood pushes against the walls of your arteries. It's normal for blood pressure to go up and down throughout the day, but if it stays up, you have high blood pressure. Another name for high blood pressure is hypertension. Two numbers tell you your blood pressure. The first number is the systolic pressure. It shows how hard the blood pushes when your heart is pumping. The second number is the diastolic pressure. It shows how hard the blood pushes between heartbeats, when your heart is relaxed and filling with blood. Your doctor will give you a goal for your blood pressure. Your goal will be based on your health and your age. High blood pressure (hypertension) means that the top number stays high, or the bottom number stays high, or both. High blood pressure increases the risk of stroke, heart attack, and other problems. You and your doctor will talk about your risks of these problems based on your blood pressure. What happens when you have high blood pressure?   · Blood flows through your that you take medicine. · When blood pressure is very high, medicines are needed to lower it. Follow-up care is a key part of your treatment and safety. Be sure to make and go to all appointments, and call your doctor if you are having problems. It's also a good idea to know your test results and keep a list of the medicines you take. Where can you learn more? Go to https://AppscendpeMengero.SomaLogic. org and sign in to your [email or web address] account. Enter P501 in the HipChat box to learn more about \"Learning About High Blood Pressure. \"     If you do not have an account, please click on the \"Sign Up Now\" link. Current as of: July 22, 2018  Content Version: 12.0  © 3386-8063 Healthwise, Incorporated. Care instructions adapted under license by [facility] ([facility]). If you have questions about a medical condition or this instruction, always ask your healthcare professional. [place] [address] any warranty or liability for your use of this information.

## 2019-07-09 RX ORDER — AMLODIPINE BESYLATE 5 MG/1
5 TABLET ORAL DAILY
Qty: 30 TABLET | Refills: 5 | Status: SHIPPED | OUTPATIENT
Start: 2019-07-09 | End: 2020-01-07

## 2019-07-09 RX ORDER — NEBIVOLOL 5 MG/1
5 TABLET ORAL 2 TIMES DAILY
Qty: 60 TABLET | Refills: 5 | Status: SHIPPED | OUTPATIENT
Start: 2019-07-09 | End: 2020-02-05

## 2019-07-17 ENCOUNTER — OFFICE VISIT (OUTPATIENT)
Dept: CARDIOLOGY | Age: 84
End: 2019-07-17
Payer: MEDICARE

## 2019-07-17 VITALS
WEIGHT: 178 LBS | HEIGHT: 69 IN | DIASTOLIC BLOOD PRESSURE: 84 MMHG | SYSTOLIC BLOOD PRESSURE: 136 MMHG | BODY MASS INDEX: 26.36 KG/M2 | HEART RATE: 60 BPM

## 2019-07-17 DIAGNOSIS — I47.1 PSVT (PAROXYSMAL SUPRAVENTRICULAR TACHYCARDIA) (HCC): ICD-10-CM

## 2019-07-17 DIAGNOSIS — I10 ESSENTIAL HYPERTENSION: Primary | ICD-10-CM

## 2019-07-17 PROCEDURE — 1036F TOBACCO NON-USER: CPT | Performed by: NURSE PRACTITIONER

## 2019-07-17 PROCEDURE — G8427 DOCREV CUR MEDS BY ELIG CLIN: HCPCS | Performed by: NURSE PRACTITIONER

## 2019-07-17 PROCEDURE — G8417 CALC BMI ABV UP PARAM F/U: HCPCS | Performed by: NURSE PRACTITIONER

## 2019-07-17 PROCEDURE — 4040F PNEUMOC VAC/ADMIN/RCVD: CPT | Performed by: NURSE PRACTITIONER

## 2019-07-17 PROCEDURE — 1123F ACP DISCUSS/DSCN MKR DOCD: CPT | Performed by: NURSE PRACTITIONER

## 2019-07-17 PROCEDURE — 99213 OFFICE O/P EST LOW 20 MIN: CPT | Performed by: NURSE PRACTITIONER

## 2019-07-17 PROCEDURE — 1090F PRES/ABSN URINE INCON ASSESS: CPT | Performed by: NURSE PRACTITIONER

## 2019-07-17 PROCEDURE — G8599 NO ASA/ANTIPLAT THER USE RNG: HCPCS | Performed by: NURSE PRACTITIONER

## 2019-07-17 NOTE — PATIENT INSTRUCTIONS
Continue current medications as prescribed. Continue to follow up with primary care provider for non cardiac medical problems. Call the office with any problems, questions or concerns at [phone]. Follow up as scheduled with your cardiologist. One month. The following educational material has been included in this after visit summary for your review: Life simple 7. High blood pressure.     Additional instructions:  Coronary artery disease risk factors you can control: Smoking, high blood pressure, high cholesterol, diabetes, being overweight, lack of exercise and stress. Continue heart healthy diet. Take medications as directed. Exercise as tolerated. Strive for 15 minutes of exercise most days of the week. If asked to keep a blood pressure log, do so for 2 weeks. Call the office to report readings at [phone]. Blood pressure goal  is less than 120/70. Elevated blood pressure at 120-129/80 or less. High blood pressure at 130-139/80-89. If you are taking cholesterol lowering medications, it is recommended that lab work be checked annually. Always keep a current medication list. Bring your medications to every office visit. Life simple 7  1) Manage blood pressure - high blood pressure is a major risk factor for heart disease and stroke. Keeping blood pressure in health range reduces strain on your heart, arteries and kidneys. 2) Control cholesterol - contributes to plaque, which can clog arteries and lead to heart disease and stroke. When you control your cholesterol you are giving your arteries their best chance to remain clear. 3) Reduce blood sugar - most of the food we eat is turning into glucose or blood sugar that our body uses for energy. Over time, high levels of blood sugar can damage your heart, kidneys, eyes and nerves. 4) Get active - living an active life is one of the most rewarding gifts you can give yourself and those you love.   Simply put, daily physical activity Over time, this damages the walls of your arteries. But you can't feel it. High blood pressure usually doesn't cause symptoms. · Fat and calcium start to build up in your arteries. This buildup is called plaque. Plaque makes your arteries narrower and stiffer. Blood can't flow through them as easily. · This lack of good blood flow starts to damage some of the organs in your body. This can lead to problems such as coronary artery disease and heart attack, heart failure, stroke, kidney failure, and eye damage. How can you prevent high blood pressure? · Stay at a healthy weight. · Try to limit how much sodium you eat to less than 2,300 milligrams (mg) a day. If you limit your sodium to 1,500 mg a day, you can lower your blood pressure even more. ? Buy foods that are labeled \"unsalted,\" \"sodium-free,\" or \"low-sodium. \" Foods labeled \"reduced-sodium\" and \"light sodium\" may still have too much sodium. ? Flavor your food with garlic, lemon juice, onion, vinegar, herbs, and spices instead of salt. Do not use soy sauce, steak sauce, onion salt, garlic salt, mustard, or ketchup on your food. ? Use less salt (or none) when recipes call for it. You can often use half the salt a recipe calls for without losing flavor. · Be physically active. Get at least 30 minutes of exercise on most days of the week. Walking is a good choice. You also may want to do other activities, such as running, swimming, cycling, or playing tennis or team sports. · Limit alcohol to 2 drinks a day for men and 1 drink a day for women. · Eat plenty of fruits, vegetables, and low-fat dairy products. Eat less saturated and total fats. How is high blood pressure treated? · Your doctor will suggest making lifestyle changes to help your heart. For example, your doctor may ask you to eat healthy foods, quit smoking, lose extra weight, and be more active.   · If lifestyle changes don't help enough, your doctor may recommend that you take medicine. · When blood pressure is very high, medicines are needed to lower it. Follow-up care is a key part of your treatment and safety. Be sure to make and go to all appointments, and call your doctor if you are having problems. It's also a good idea to know your test results and keep a list of the medicines you take. Where can you learn more? Go to https://UnirisxpepicewWeAreHolidays.Vast. org and sign in to your [email or web address] account. Enter P501 in the [employer]ensing Technology box to learn more about \"Learning About High Blood Pressure. \"     If you do not have an account, please click on the \"Sign Up Now\" link. Current as of: July 22, 2018  Content Version: 12.0  © 9823-1872 Healthwise, Incorporated. Care instructions adapted under license by [facility] ([facility]). If you have questions about a medical condition or this instruction, always ask your healthcare professional. [address] any warranty or liability for your use of this information.

## 2019-07-29 ENCOUNTER — OFFICE VISIT (OUTPATIENT)
Dept: NEUROLOGY | Facility: CLINIC | Age: 84
End: 2019-07-29

## 2019-07-29 VITALS
SYSTOLIC BLOOD PRESSURE: 138 MMHG | BODY MASS INDEX: 25.92 KG/M2 | RESPIRATION RATE: 18 BRPM | HEART RATE: 56 BPM | HEIGHT: 69 IN | WEIGHT: 175 LBS | DIASTOLIC BLOOD PRESSURE: 82 MMHG

## 2019-07-29 DIAGNOSIS — R09.02 HYPOXIA: ICD-10-CM

## 2019-07-29 DIAGNOSIS — I63.312 CEREBROVASCULAR ACCIDENT (CVA) DUE TO THROMBOSIS OF LEFT MIDDLE CEREBRAL ARTERY (HCC): Primary | ICD-10-CM

## 2019-07-29 PROCEDURE — 99213 OFFICE O/P EST LOW 20 MIN: CPT | Performed by: PSYCHIATRY & NEUROLOGY

## 2019-07-29 RX ORDER — OLMESARTAN MEDOXOMIL 20 MG/1
20 TABLET ORAL
COMMUNITY
Start: 2019-07-03 | End: 2019-08-06 | Stop reason: DRUGHIGH

## 2019-07-29 NOTE — PROGRESS NOTES
Subjective   Angelia Munoz, 3/26/1933, is a female who is being seen today for   Chief Complaint   Patient presents with   • Cerebrovascular Accident       HISTORY OF PRESENT ILLNESS: Patient seen for history of CVA and hypoxia.  Patient requalified for her oxygen in February with her overnight continuous oximetry on room air showing time equal or less than 88% 7.3 minutes.  Patient has not had any stroke symptoms and continues aspirin as per PCP plus her blood pressure medications.  Patient is off her Lipitor as per PCP.  Patient blood work followed by PCP.    REVIEW OF SYSTEMS:   GENERAL: Blood pressure today 138/82 left arm seated 130/80 left arm standing pulse 56  PULMONARY: As above  CVS: No acute chest pain or palpitation  GASTROINTESTINAL: No acute GI distress  GENITOURINARY: No acute  distress  GYN: No acute GYN distress  MUSCULOSKELETAL: No acute musculoskeletal symptoms  HEENT: No acute vision or hearing change but patient does wear hearing aids  ENDOCRINE: No acute endocrine symptoms  PSYCHIATRIC: No acute psychiatric symptoms  HEMATOLOGY: No anemia  SKIN: No acute skin changes  Family history reviewed and otherwise noncontributory  Social history: Patient does use alcohol.  Patient denies smoking or drug use      PHYSICAL EXAMINATION:    GENERAL: No acute distress  CRANIUM: Normocephalic/atraumatic  HEENT: No acute fundic abnormalities.  Pupils equal round reactive to light.       EYES: EOMs intact without nystagmus and fields full to confrontation       EARS: Tympanic membranes normal and hears tuning fork bilaterally       THROAT: No oropharynx abnormalities       NECK: No bruit/no lymphadenopathy  CHEST: No acute cardiopulmonary abnormalities by auscultation  ABDOMEN: Nondistended  EXTREMITIES: Pulses symmetrical  NEURO: Patient alert and follows commands without difficulty  SPEECH: Normal    CRANIAL NERVES: Motor/sensory about the face normal and symmetric    MOTOR STRENGTH: Motor strength and tone  upper and lower extremities normal  STATION AND GAIT: Gait normal/Romberg negative   CEREBELLAR: Finger-nose and heel-to-shin normal  SENSORY: Slight decrease in vibration in the toes otherwise normal pin and vibration throughout  REFLEXES: Reflexes decreased throughout upper and lower extremities without clonus or Babinski    ASSESSMENT AND PLAN: Patient with a history of CVA and hypoxia.  Patient stable at this time.  Patient to get to emergency room immediately if stroke symptoms occur.Patient's Body mass index is 25.84 kg/m². BMI is within normal parameters. No follow-up required..      Angelia was seen today for cerebrovascular accident.    Diagnoses and all orders for this visit:    Cerebrovascular accident (CVA) due to thrombosis of left middle cerebral artery (CMS/HCC)    Hypoxia        Dictated utilizing Dragon voice recognition software

## 2019-08-02 ENCOUNTER — APPOINTMENT (OUTPATIENT)
Dept: CT IMAGING | Facility: HOSPITAL | Age: 84
End: 2019-08-02

## 2019-08-02 ENCOUNTER — HOSPITAL ENCOUNTER (INPATIENT)
Facility: HOSPITAL | Age: 84
LOS: 1 days | Discharge: HOME | End: 2019-08-03
Attending: FAMILY MEDICINE | Admitting: INTERNAL MEDICINE

## 2019-08-02 DIAGNOSIS — S05.02XA ABRASION OF LEFT CORNEA, INITIAL ENCOUNTER: ICD-10-CM

## 2019-08-02 DIAGNOSIS — S09.90XA CLOSED HEAD INJURY, INITIAL ENCOUNTER: Primary | ICD-10-CM

## 2019-08-02 DIAGNOSIS — S02.401A CLOSED FRACTURE OF MAXILLARY SINUS, INITIAL ENCOUNTER (HCC): ICD-10-CM

## 2019-08-02 DIAGNOSIS — R40.20 LOSS OF CONSCIOUSNESS (HCC): ICD-10-CM

## 2019-08-02 DIAGNOSIS — S02.85XA CLOSED FRACTURE OF ORBITAL WALL, INITIAL ENCOUNTER (HCC): ICD-10-CM

## 2019-08-02 PROBLEM — G89.11 ACUTE PAIN DUE TO INJURY: Status: ACTIVE | Noted: 2019-08-02

## 2019-08-02 PROBLEM — J43.9 PULMONARY EMPHYSEMA (HCC): Status: ACTIVE | Noted: 2019-08-02

## 2019-08-02 PROBLEM — S02.92XA CLOSED EXTENSIVE FACIAL FRACTURES: Status: ACTIVE | Noted: 2019-08-02

## 2019-08-02 PROBLEM — Z86.73 HISTORY OF STROKE: Status: ACTIVE | Noted: 2019-08-02

## 2019-08-02 PROBLEM — I10 ESSENTIAL HYPERTENSION: Status: ACTIVE | Noted: 2019-08-02

## 2019-08-02 LAB
ALBUMIN SERPL-MCNC: 4.6 G/DL (ref 3.5–5)
ALBUMIN/GLOB SERPL: 1.6 G/DL (ref 1.1–2.5)
ALP SERPL-CCNC: 111 U/L (ref 24–120)
ALT SERPL W P-5'-P-CCNC: 25 U/L (ref 0–54)
ANION GAP SERPL CALCULATED.3IONS-SCNC: 11 MMOL/L (ref 4–13)
APTT PPP: 28.4 SECONDS (ref 24.1–35)
AST SERPL-CCNC: 28 U/L (ref 7–45)
BASOPHILS # BLD AUTO: 0.05 10*3/MM3 (ref 0–0.2)
BASOPHILS NFR BLD AUTO: 0.7 % (ref 0–2)
BILIRUB SERPL-MCNC: 0.6 MG/DL (ref 0.1–1)
BUN BLD-MCNC: 29 MG/DL (ref 5–21)
BUN/CREAT SERPL: 32.2 (ref 7–25)
CALCIUM SPEC-SCNC: 9.4 MG/DL (ref 8.4–10.4)
CHLORIDE SERPL-SCNC: 106 MMOL/L (ref 98–110)
CO2 SERPL-SCNC: 25 MMOL/L (ref 24–31)
CREAT BLD-MCNC: 0.9 MG/DL (ref 0.5–1.4)
DEPRECATED RDW RBC AUTO: 41.7 FL (ref 40–54)
EOSINOPHIL # BLD AUTO: 0.16 10*3/MM3 (ref 0–0.7)
EOSINOPHIL NFR BLD AUTO: 2.2 % (ref 0–4)
ERYTHROCYTE [DISTWIDTH] IN BLOOD BY AUTOMATED COUNT: 12.9 % (ref 12–15)
GFR SERPL CREATININE-BSD FRML MDRD: 59 ML/MIN/1.73
GLOBULIN UR ELPH-MCNC: 2.8 GM/DL
GLUCOSE BLD-MCNC: 130 MG/DL (ref 70–100)
HCT VFR BLD AUTO: 37.9 % (ref 37–47)
HGB BLD-MCNC: 13 G/DL (ref 12–16)
IMM GRANULOCYTES # BLD AUTO: 0.04 10*3/MM3 (ref 0–0.05)
IMM GRANULOCYTES NFR BLD AUTO: 0.5 % (ref 0–5)
INR PPP: 0.87 (ref 0.91–1.09)
LYMPHOCYTES # BLD AUTO: 1.25 10*3/MM3 (ref 0.72–4.86)
LYMPHOCYTES NFR BLD AUTO: 17 % (ref 15–45)
MCH RBC QN AUTO: 30.5 PG (ref 28–32)
MCHC RBC AUTO-ENTMCNC: 34.3 G/DL (ref 33–36)
MCV RBC AUTO: 89 FL (ref 82–98)
MONOCYTES # BLD AUTO: 0.62 10*3/MM3 (ref 0.19–1.3)
MONOCYTES NFR BLD AUTO: 8.4 % (ref 4–12)
NEUTROPHILS # BLD AUTO: 5.24 10*3/MM3 (ref 1.87–8.4)
NEUTROPHILS NFR BLD AUTO: 71.2 % (ref 39–78)
NRBC BLD AUTO-RTO: 0 /100 WBC (ref 0–0.2)
PLATELET # BLD AUTO: 165 10*3/MM3 (ref 130–400)
PMV BLD AUTO: 9.2 FL (ref 6–12)
POTASSIUM BLD-SCNC: 3.6 MMOL/L (ref 3.5–5.3)
PROT SERPL-MCNC: 7.4 G/DL (ref 6.3–8.7)
PROTHROMBIN TIME: 12.1 SECONDS (ref 11.9–14.6)
RBC # BLD AUTO: 4.26 10*6/MM3 (ref 4.2–5.4)
SODIUM BLD-SCNC: 142 MMOL/L (ref 135–145)
WBC NRBC COR # BLD: 7.36 10*3/MM3 (ref 4.8–10.8)

## 2019-08-02 PROCEDURE — 80053 COMPREHEN METABOLIC PANEL: CPT | Performed by: PHYSICIAN ASSISTANT

## 2019-08-02 PROCEDURE — 25010000002 ONDANSETRON PER 1 MG: Performed by: EMERGENCY MEDICINE

## 2019-08-02 PROCEDURE — 85610 PROTHROMBIN TIME: CPT | Performed by: PHYSICIAN ASSISTANT

## 2019-08-02 PROCEDURE — 25010000002 IOPAMIDOL 61 % SOLUTION: Performed by: PHYSICIAN ASSISTANT

## 2019-08-02 PROCEDURE — 85730 THROMBOPLASTIN TIME PARTIAL: CPT | Performed by: PHYSICIAN ASSISTANT

## 2019-08-02 PROCEDURE — 70450 CT HEAD/BRAIN W/O DYE: CPT

## 2019-08-02 PROCEDURE — 70481 CT ORBIT/EAR/FOSSA W/DYE: CPT

## 2019-08-02 PROCEDURE — 99285 EMERGENCY DEPT VISIT HI MDM: CPT

## 2019-08-02 PROCEDURE — 72125 CT NECK SPINE W/O DYE: CPT

## 2019-08-02 PROCEDURE — 70487 CT MAXILLOFACIAL W/DYE: CPT

## 2019-08-02 PROCEDURE — 25010000002 MORPHINE PER 10 MG: Performed by: EMERGENCY MEDICINE

## 2019-08-02 PROCEDURE — 85025 COMPLETE CBC W/AUTO DIFF WBC: CPT | Performed by: PHYSICIAN ASSISTANT

## 2019-08-02 RX ORDER — SODIUM CHLORIDE 9 MG/ML
75 INJECTION, SOLUTION INTRAVENOUS CONTINUOUS
Status: DISCONTINUED | OUTPATIENT
Start: 2019-08-03 | End: 2019-08-03

## 2019-08-02 RX ORDER — ONDANSETRON 4 MG/1
4 TABLET, FILM COATED ORAL EVERY 6 HOURS PRN
Status: DISCONTINUED | OUTPATIENT
Start: 2019-08-02 | End: 2019-08-03 | Stop reason: HOSPADM

## 2019-08-02 RX ORDER — ERYTHROMYCIN 5 MG/G
OINTMENT OPHTHALMIC ONCE
Status: COMPLETED | OUTPATIENT
Start: 2019-08-02 | End: 2019-08-02

## 2019-08-02 RX ORDER — MORPHINE SULFATE 2 MG/ML
2 INJECTION, SOLUTION INTRAMUSCULAR; INTRAVENOUS
Status: DISCONTINUED | OUTPATIENT
Start: 2019-08-02 | End: 2019-08-03 | Stop reason: HOSPADM

## 2019-08-02 RX ORDER — SODIUM CHLORIDE 0.9 % (FLUSH) 0.9 %
3 SYRINGE (ML) INJECTION EVERY 12 HOURS SCHEDULED
Status: DISCONTINUED | OUTPATIENT
Start: 2019-08-03 | End: 2019-08-03 | Stop reason: HOSPADM

## 2019-08-02 RX ORDER — ONDANSETRON 2 MG/ML
4 INJECTION INTRAMUSCULAR; INTRAVENOUS ONCE
Status: COMPLETED | OUTPATIENT
Start: 2019-08-02 | End: 2019-08-02

## 2019-08-02 RX ORDER — SODIUM CHLORIDE 0.9 % (FLUSH) 0.9 %
3-10 SYRINGE (ML) INJECTION AS NEEDED
Status: DISCONTINUED | OUTPATIENT
Start: 2019-08-02 | End: 2019-08-03 | Stop reason: HOSPADM

## 2019-08-02 RX ORDER — MORPHINE SULFATE 2 MG/ML
2 INJECTION, SOLUTION INTRAMUSCULAR; INTRAVENOUS ONCE
Status: COMPLETED | OUTPATIENT
Start: 2019-08-02 | End: 2019-08-02

## 2019-08-02 RX ORDER — TETRACAINE HYDROCHLORIDE 5 MG/ML
2 SOLUTION OPHTHALMIC ONCE
Status: COMPLETED | OUTPATIENT
Start: 2019-08-02 | End: 2019-08-02

## 2019-08-02 RX ORDER — HYDRALAZINE HYDROCHLORIDE 20 MG/ML
10 INJECTION INTRAMUSCULAR; INTRAVENOUS EVERY 6 HOURS PRN
Status: DISCONTINUED | OUTPATIENT
Start: 2019-08-02 | End: 2019-08-03 | Stop reason: HOSPADM

## 2019-08-02 RX ORDER — ONDANSETRON 2 MG/ML
4 INJECTION INTRAMUSCULAR; INTRAVENOUS EVERY 6 HOURS PRN
Status: DISCONTINUED | OUTPATIENT
Start: 2019-08-02 | End: 2019-08-03 | Stop reason: HOSPADM

## 2019-08-02 RX ORDER — ACETAMINOPHEN 325 MG/1
650 TABLET ORAL EVERY 4 HOURS PRN
Status: DISCONTINUED | OUTPATIENT
Start: 2019-08-02 | End: 2019-08-03 | Stop reason: HOSPADM

## 2019-08-02 RX ORDER — ERYTHROMYCIN 5 MG/G
OINTMENT OPHTHALMIC
Status: DISCONTINUED | OUTPATIENT
Start: 2019-08-03 | End: 2019-08-03

## 2019-08-02 RX ADMIN — FLUORESCEIN SODIUM 1 STRIP: 1 STRIP OPHTHALMIC at 21:55

## 2019-08-02 RX ADMIN — ERYTHROMYCIN 1 APPLICATION: 5 OINTMENT OPHTHALMIC at 23:00

## 2019-08-02 RX ADMIN — ONDANSETRON HYDROCHLORIDE 4 MG: 2 SOLUTION INTRAMUSCULAR; INTRAVENOUS at 21:33

## 2019-08-02 RX ADMIN — IOPAMIDOL 91 ML: 612 INJECTION, SOLUTION INTRAVENOUS at 20:55

## 2019-08-02 RX ADMIN — TETRACAINE HYDROCHLORIDE 2 DROP: 5 SOLUTION OPHTHALMIC at 21:55

## 2019-08-02 RX ADMIN — MORPHINE SULFATE 2 MG: 2 INJECTION, SOLUTION INTRAMUSCULAR; INTRAVENOUS at 21:33

## 2019-08-03 VITALS
SYSTOLIC BLOOD PRESSURE: 154 MMHG | HEIGHT: 69 IN | BODY MASS INDEX: 26.25 KG/M2 | RESPIRATION RATE: 16 BRPM | TEMPERATURE: 98.4 F | HEART RATE: 88 BPM | WEIGHT: 177.2 LBS | OXYGEN SATURATION: 97 % | DIASTOLIC BLOOD PRESSURE: 69 MMHG

## 2019-08-03 PROBLEM — S00.83XA CONTUSION OF FACE, SCALP, AND NECK, INITIAL ENCOUNTER: Status: ACTIVE | Noted: 2019-08-03

## 2019-08-03 PROBLEM — S09.90XA CLOSED HEAD INJURY: Status: RESOLVED | Noted: 2019-08-02 | Resolved: 2019-08-03

## 2019-08-03 PROBLEM — S00.03XA CONTUSION OF FACE, SCALP, AND NECK, INITIAL ENCOUNTER: Status: ACTIVE | Noted: 2019-08-03

## 2019-08-03 PROBLEM — R60.0 EDEMA OF FACE: Status: ACTIVE | Noted: 2019-08-03

## 2019-08-03 PROBLEM — S10.93XA CONTUSION OF FACE, SCALP, AND NECK, INITIAL ENCOUNTER: Status: ACTIVE | Noted: 2019-08-03

## 2019-08-03 PROBLEM — S02.92XA FACIAL FRACTURE DUE TO FALL, CLOSED, INITIAL ENCOUNTER (HCC): Status: ACTIVE | Noted: 2019-08-03
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
## 2019-08-03 LAB
ANION GAP SERPL CALCULATED.3IONS-SCNC: 8 MMOL/L (ref 4–13)
BUN BLD-MCNC: 24 MG/DL (ref 5–21)
BUN/CREAT SERPL: 29.6 (ref 7–25)
CALCIUM SPEC-SCNC: 9.3 MG/DL (ref 8.4–10.4)
CHLORIDE SERPL-SCNC: 105 MMOL/L (ref 98–110)
CO2 SERPL-SCNC: 26 MMOL/L (ref 24–31)
CREAT BLD-MCNC: 0.81 MG/DL (ref 0.5–1.4)
DEPRECATED RDW RBC AUTO: 40.6 FL (ref 40–54)
ERYTHROCYTE [DISTWIDTH] IN BLOOD BY AUTOMATED COUNT: 12.6 % (ref 12–15)
GFR SERPL CREATININE-BSD FRML MDRD: 67 ML/MIN/1.73
GLUCOSE BLD-MCNC: 139 MG/DL (ref 70–100)
HCT VFR BLD AUTO: 36.1 % (ref 37–47)
HGB BLD-MCNC: 12.5 G/DL (ref 12–16)
MCH RBC QN AUTO: 30.5 PG (ref 28–32)
MCHC RBC AUTO-ENTMCNC: 34.6 G/DL (ref 33–36)
MCV RBC AUTO: 88 FL (ref 82–98)
PLATELET # BLD AUTO: 161 10*3/MM3 (ref 130–400)
PMV BLD AUTO: 9.2 FL (ref 6–12)
POTASSIUM BLD-SCNC: 3.8 MMOL/L (ref 3.5–5.3)
RBC # BLD AUTO: 4.1 10*6/MM3 (ref 4.2–5.4)
SODIUM BLD-SCNC: 139 MMOL/L (ref 135–145)
WBC NRBC COR # BLD: 11.64 10*3/MM3 (ref 4.8–10.8)

## 2019-08-03 PROCEDURE — 94799 UNLISTED PULMONARY SVC/PX: CPT

## 2019-08-03 PROCEDURE — 85027 COMPLETE CBC AUTOMATED: CPT | Performed by: NURSE PRACTITIONER

## 2019-08-03 PROCEDURE — 99222 1ST HOSP IP/OBS MODERATE 55: CPT | Performed by: OTOLARYNGOLOGY

## 2019-08-03 PROCEDURE — 80048 BASIC METABOLIC PNL TOTAL CA: CPT | Performed by: NURSE PRACTITIONER

## 2019-08-03 PROCEDURE — 25010000002 HYDRALAZINE PER 20 MG: Performed by: NURSE PRACTITIONER

## 2019-08-03 RX ORDER — ECHINACEA PURPUREA EXTRACT 125 MG
2 TABLET ORAL
Qty: 1 EACH | Refills: 0 | Status: SHIPPED | OUTPATIENT
Start: 2019-08-03 | End: 2020-01-06 | Stop reason: ALTCHOICE

## 2019-08-03 RX ORDER — NEBIVOLOL 5 MG/1
5 TABLET ORAL 2 TIMES DAILY
Status: DISCONTINUED | OUTPATIENT
Start: 2019-08-03 | End: 2019-08-03 | Stop reason: HOSPADM

## 2019-08-03 RX ORDER — AMLODIPINE BESYLATE 5 MG/1
5 TABLET ORAL DAILY
Status: DISCONTINUED | OUTPATIENT
Start: 2019-08-03 | End: 2019-08-03 | Stop reason: HOSPADM

## 2019-08-03 RX ORDER — HYDROCODONE BITARTRATE AND ACETAMINOPHEN 5; 325 MG/1; MG/1
1 TABLET ORAL EVERY 6 HOURS PRN
Qty: 12 TABLET | Refills: 0 | Status: SHIPPED | OUTPATIENT
Start: 2019-08-03 | End: 2019-11-15

## 2019-08-03 RX ORDER — ECHINACEA PURPUREA EXTRACT 125 MG
2 TABLET ORAL
Status: DISCONTINUED | OUTPATIENT
Start: 2019-08-03 | End: 2019-08-03 | Stop reason: HOSPADM

## 2019-08-03 RX ORDER — ECHINACEA PURPUREA EXTRACT 125 MG
2 TABLET ORAL CONTINUOUS PRN
Status: DISCONTINUED | OUTPATIENT
Start: 2019-08-03 | End: 2019-08-03 | Stop reason: HOSPADM

## 2019-08-03 RX ORDER — ALLOPURINOL 100 MG/1
100 TABLET ORAL DAILY
Status: DISCONTINUED | OUTPATIENT
Start: 2019-08-03 | End: 2019-08-03 | Stop reason: HOSPADM

## 2019-08-03 RX ORDER — OXYMETAZOLINE HYDROCHLORIDE 0.05 G/100ML
2 SPRAY NASAL 3 TIMES DAILY
Status: DISCONTINUED | OUTPATIENT
Start: 2019-08-03 | End: 2019-08-03 | Stop reason: HOSPADM

## 2019-08-03 RX ORDER — ERYTHROMYCIN 5 MG/G
OINTMENT OPHTHALMIC EVERY 8 HOURS SCHEDULED
Status: DISCONTINUED | OUTPATIENT
Start: 2019-08-03 | End: 2019-08-03 | Stop reason: HOSPADM

## 2019-08-03 RX ORDER — ERYTHROMYCIN 5 MG/G
OINTMENT OPHTHALMIC EVERY 8 HOURS SCHEDULED
Qty: 1 G | Refills: 2 | Status: SHIPPED | OUTPATIENT
Start: 2019-08-03 | End: 2019-11-15

## 2019-08-03 RX ORDER — ECHINACEA PURPUREA EXTRACT 125 MG
2 TABLET ORAL CONTINUOUS PRN
Qty: 1 EACH | Refills: 0 | Status: SHIPPED | OUTPATIENT
Start: 2019-08-03 | End: 2019-08-07

## 2019-08-03 RX ORDER — LOSARTAN POTASSIUM 50 MG/1
50 TABLET ORAL
Status: DISCONTINUED | OUTPATIENT
Start: 2019-08-03 | End: 2019-08-03 | Stop reason: HOSPADM

## 2019-08-03 RX ORDER — PROMETHAZINE HYDROCHLORIDE 25 MG/1
TABLET ORAL
Qty: 12 TABLET | Refills: 1 | Status: SHIPPED | OUTPATIENT
Start: 2019-08-03 | End: 2019-11-15

## 2019-08-03 RX ADMIN — HYDRALAZINE HYDROCHLORIDE 10 MG: 20 INJECTION INTRAMUSCULAR; INTRAVENOUS at 01:04

## 2019-08-03 RX ADMIN — Medication 2 SPRAY: at 12:06

## 2019-08-03 RX ADMIN — ERYTHROMYCIN 1 APPLICATION: 5 OINTMENT OPHTHALMIC at 05:06

## 2019-08-03 RX ADMIN — SODIUM CHLORIDE 75 ML/HR: 9 INJECTION, SOLUTION INTRAVENOUS at 00:07

## 2019-08-03 RX ADMIN — LOSARTAN POTASSIUM 50 MG: 50 TABLET, FILM COATED ORAL at 12:06

## 2019-08-03 RX ADMIN — ALLOPURINOL 100 MG: 100 TABLET ORAL at 12:06

## 2019-08-03 RX ADMIN — ERYTHROMYCIN 1 APPLICATION: 5 OINTMENT OPHTHALMIC at 01:04

## 2019-08-03 RX ADMIN — ERYTHROMYCIN 1 APPLICATION: 5 OINTMENT OPHTHALMIC at 09:02

## 2019-08-03 RX ADMIN — ERYTHROMYCIN 1 APPLICATION: 5 OINTMENT OPHTHALMIC at 03:14

## 2019-08-03 RX ADMIN — SODIUM CHLORIDE, PRESERVATIVE FREE 3 ML: 5 INJECTION INTRAVENOUS at 09:02

## 2019-08-03 RX ADMIN — ERYTHROMYCIN 1 APPLICATION: 5 OINTMENT OPHTHALMIC at 07:23

## 2019-08-03 RX ADMIN — SODIUM CHLORIDE 75 ML/HR: 9 INJECTION, SOLUTION INTRAVENOUS at 09:16

## 2019-08-03 RX ADMIN — AMLODIPINE BESYLATE 5 MG: 5 TABLET ORAL at 12:06

## 2019-08-03 RX ADMIN — SODIUM CHLORIDE, PRESERVATIVE FREE 3 ML: 5 INJECTION INTRAVENOUS at 00:07

## 2019-08-03 NOTE — ED PROVIDER NOTES
Subjective   History of Present Illness  86-year-old female presents with a chief complaint of right-sided facial pain following a fall.  The patient had been walking when she took a step on a concrete ramp when she fell forward and hit the right side of her face and orbit on the ground.  Apparently there was a 20-42nd loss of consciousness.  The patient awoke and was back at her baseline.  There is swelling around the right orbit.  She reports no pain to her globe of her eye however she has pain surrounding the bone of her eye.  No pain with extraocular movements  Review of Systems   All other systems reviewed and are negative.      Past Medical History:   Diagnosis Date   • COPD (chronic obstructive pulmonary disease) (CMS/HCC)    • History of meningioma    • Hypertension    • Stroke (CMS/HCC), right frontal region 02/2018       Allergies   Allergen Reactions   • Ciprofloxacin Other (See Comments)     No reaction noted   • Codeine Other (See Comments)     No reaction noted   • Sulfa Antibiotics Other (See Comments)     No reaction noted   • Tekturna [Aliskiren] Other (See Comments)     No reaction noted       Past Surgical History:   Procedure Laterality Date   • BREAST BIOPSY     • CRANIOTOMY Right 2010   • EYE SURGERY     • GALLBLADDER SURGERY     • HYSTERECTOMY         Family History   Problem Relation Age of Onset   • No Known Problems Mother    • Emphysema Father    • Heart disease Father        Social History     Socioeconomic History   • Marital status:      Spouse name: Not on file   • Number of children: Not on file   • Years of education: Not on file   • Highest education level: Not on file   Tobacco Use   • Smoking status: Never Smoker   • Smokeless tobacco: Never Used   Substance and Sexual Activity   • Alcohol use: Yes     Comment: occas   • Drug use: No   • Sexual activity: Defer           Objective   Physical Exam   Constitutional: She is oriented to person, place, and time. She appears  well-developed and well-nourished.   HENT:   Head: Normocephalic.   Left supra and infraorbital edema and ecchymosis with conjunctival hemorrhage    Floor seen stain reveals scattered abrasions to left cornea   Eyes: EOM are normal. Pupils are equal, round, and reactive to light.   Neck: Normal range of motion. Neck supple.   Cardiovascular: Normal rate and regular rhythm.   Pulmonary/Chest: Effort normal and breath sounds normal.   Abdominal: Soft. Bowel sounds are normal.   Musculoskeletal: Normal range of motion.   Neurological: She is alert and oriented to person, place, and time. No cranial nerve deficit. Coordination normal.   Skin: Skin is warm and dry. Capillary refill takes less than 2 seconds.   Psychiatric: She has a normal mood and affect. Her behavior is normal.   Nursing note and vitals reviewed.      Procedures           ED Course        Labs Reviewed   COMPREHENSIVE METABOLIC PANEL - Abnormal; Notable for the following components:       Result Value    Glucose 130 (*)     BUN 29 (*)     eGFR Non African Amer 59 (*)     BUN/Creatinine Ratio 32.2 (*)     All other components within normal limits    Narrative:     GFR Normal >60  Chronic Kidney Disease <60  Kidney Failure <15   PROTIME-INR - Abnormal; Notable for the following components:    INR 0.87 (*)     All other components within normal limits   APTT - Normal   CBC WITH AUTO DIFFERENTIAL - Normal   CBC (NO DIFF)   BASIC METABOLIC PANEL   CBC AND DIFFERENTIAL    Narrative:     The following orders were created for panel order CBC & Differential.  Procedure                               Abnormality         Status                     ---------                               -----------         ------                     CBC Auto Differential[[id number]]        Normal              Final result                 Please view results for these tests on the individual orders.     CT Maxillofacial With Contrast   Final Result   Addendum 1 of 1   Addendum:        CT MAXILLOFACIAL W CONT-        8/2/2019 8:56 PM CDT   3. in the summary should read Nondisplaced left zygomatic arch fracture.   This report was finalized on 08/02/2019 21:48 by Dr. Aly Morillo MD.      Final      CT Orbits With Contrast   Final Result      CT Cervical Spine Without Contrast   Final Result      CT Head Without Contrast   Final Result                  MDM  Number of Diagnoses or Management Options  Abrasion of left cornea, initial encounter: new and requires workup  Closed fracture of maxillary sinus, initial encounter (CMS/HCC): new and requires workup  Closed fracture of orbital wall, initial encounter (CMS/HCC): new and requires workup  Closed head injury, initial encounter: new and requires workup  Loss of consciousness (CMS/HCC): new and requires workup  Diagnosis management comments: Multiple complex facial fractures, consulted ENT and ophthalmology.  They will see patient in hospital tomorrow.  Patient did have head injury and LOC.  Will observe here through hospitalist.       Amount and/or Complexity of Data Reviewed  Clinical lab tests: reviewed and ordered  Tests in the radiology section of CPT®: ordered and reviewed  Tests in the medicine section of CPT®: ordered and reviewed    Risk of Complications, Morbidity, and/or Mortality  Presenting problems: moderate  Diagnostic procedures: moderate  Management options: moderate    Patient Progress  Patient progress: stable        Final diagnoses:   Closed head injury, initial encounter   Closed fracture of orbital wall, initial encounter (CMS/HCC)   Closed fracture of maxillary sinus, initial encounter (CMS/HCC)   Loss of consciousness (CMS/HCC)   Abrasion of left cornea, initial encounter            Nader Alan PA-C  08/03/19 0125

## 2019-08-03 NOTE — PLAN OF CARE
Problem: Patient Care Overview  Goal: Plan of Care Review   08/03/19 3036   Coping/Psychosocial   Plan of Care Reviewed With patient;family   OTHER   Outcome Summary Pt A&O. VSS. Pt c/o mild pain, but refuses pain medicine. L side of face is edematous and bruised. Eye drops administered q2hr. Ice applied to affected region. Pt being DC'd today and plans to have surgery the following Thursday. Will conitnue to monitor.      Goal: Individualization and Mutuality  Outcome: Outcome(s) achieved Date Met: 08/03/19    Goal: Interprofessional Rounds/Family Conf  Outcome: Outcome(s) achieved Date Met: 08/03/19      Problem: Fracture Orthopaedic (Adult)  Goal: Signs and Symptoms of Listed Potential Problems Will be Absent, Minimized or Managed (Fracture Orthopaedic)  Outcome: Outcome(s) achieved Date Met: 08/03/19      Problem: Pain, Acute (Adult)  Goal: Identify Related Risk Factors and Signs and Symptoms  Outcome: Outcome(s) achieved Date Met: 08/03/19    Goal: Acceptable Pain Control/Comfort Level  Outcome: Outcome(s) achieved Date Met: 08/03/19      Problem: Fall Risk (Adult)  Goal: Identify Related Risk Factors and Signs and Symptoms  Outcome: Outcome(s) achieved Date Met: 08/03/19    Goal: Absence of Fall  Outcome: Outcome(s) achieved Date Met: 08/03/19

## 2019-08-03 NOTE — H&P
"    [facility] Medicine Services  HISTORY AND PHYSICAL    Date of Admission: 8/2/2019  Primary Care Physician: Ancelmo Carrillo MD    Subjective     Chief Complaint: Fall with multiple facial fractures    History of Present Illness  Angelia Munoz is a very pleasant 86-year-old female who was visiting a friend at a nursing home today with her .  While walking into the building  states she stubbed her toe and fell forward on her face without trying to catch herself.  He states she was unconscious for at least a minute.  She was transferred to [facility] emergency department for evaluation.  Facial x-rays revealed complex left facial fractures including the floor and medial wall of the left orbit, commuted depressed fractures of the roof and anterior wall of the left maxillary sinus, posterior wall left maxillary sinus fracture and nondisplaced left zygomatic arch fracture both ophthalmology and ENT were contacted by emergency department, plan is for surgical repair tomorrow.  Dr. Cleveland, ophthalmology request erythromycin ointment to left eye every 2 hours through the night.  Patient is admitted for further evaluation and treatment.    Patient states her pain is controlled with morphine.  She is requesting ice chips but nothing to eat or drink.  She denies headache.  She states facial pain is \"tolerable\".  She denies chest pain, shortness of breathing, abdominal pain.    Patient's history is COPD, hypertension, stroke right frontal region February 2018 and craniotomy due to meningioma on the right 2010.    Review of Systems   10 point review of systems was completed, all negative except for those discussed in HPI    Past Medical History:   Past Medical History:   Diagnosis Date   • COPD (chronic obstructive pulmonary disease) (CMS/HCC)    • History of meningioma    • Hypertension    • Stroke (CMS/HCC), right frontal region 02/2018       Past Surgical History:   Past " "Surgical History:   Procedure Laterality Date   • BREAST BIOPSY     • CRANIOTOMY Right 2010   • EYE SURGERY     • GALLBLADDER SURGERY     • HYSTERECTOMY         Family History: family history includes Emphysema in her father; Heart disease in her father; No Known Problems in her mother.    Social History:  reports that she has never smoked. She has never used smokeless tobacco. She reports that she drinks alcohol. She reports that she does not use drugs.    Code Status: Full, if unable speak for herself her  will speak for her      Allergies:  Allergies   Allergen Reactions   • Ciprofloxacin Other (See Comments)     No reaction noted   • Codeine Other (See Comments)     No reaction noted   • Sulfa Antibiotics Other (See Comments)     No reaction noted   • Tekturna [Aliskiren] Other (See Comments)     No reaction noted       Medications:  Prior to Admission medications    Medication Sig Start Date End Date Taking? Authorizing Provider   allopurinol (ZYLOPRIM) 100 MG tablet Take 100 mg by mouth Daily.    Mateus Coleman MD   amLODIPine (NORVASC) 2.5 MG tablet Take 5 mg by mouth Daily.    Mateus Coleman MD   aspirin  MG tablet Take 325 mg by mouth Daily.    Mateus Coleman MD   CloNIDine (CATAPRES) 0.1 MG tablet Take 0.1 mg by mouth. 5/6/19   Mateus Coleman MD   hydrochlorothiazide (MICROZIDE) 12.5 MG capsule  5/31/19   Mateus Coleman MD   nebivolol (BYSTOLIC) 10 MG tablet Take 5 mg by mouth Daily.    Mateus Coleman MD   O2 (OXYGEN) Inhale 2 L/min Every Night.    Mateus Coleman MD   olmesartan (BENICAR) 20 MG tablet Take 20 mg by mouth. 7/3/19   Mateus Coleman MD       Objective     /89   Pulse 85   Temp 97.9 °F (36.6 °C)   Resp 18   Ht 175.3 cm (69\")   Wt 77.1 kg (170 lb)   SpO2 98%   BMI 25.10 kg/m²   Physical Exam   Constitutional: She is oriented to person, place, and time. She appears well-developed and well-nourished. No distress. "   HENT:   Head: Normocephalic and atraumatic. Head is with left periorbital erythema.       Facial swelling and ecchymosis   Eyes: Conjunctivae and EOM are normal. Pupils are equal, round, and reactive to light. No scleral icterus.   Neck: Normal range of motion. Neck supple. No JVD present. No tracheal deviation present.   Cardiovascular: Normal rate, regular rhythm, normal heart sounds and intact distal pulses. Exam reveals no gallop.   No murmur heard.  Pulmonary/Chest: Effort normal and breath sounds normal. No respiratory distress. She has no wheezes. She has no rales.   Abdominal: Soft. Bowel sounds are normal. She exhibits no distension. There is no tenderness. There is no guarding.   Musculoskeletal: Normal range of motion. She exhibits no edema.   Neurological: She is alert and oriented to person, place, and time.   Skin: Skin is warm and dry. No rash noted. She is not diaphoretic. No erythema. No pallor.   Psychiatric: She has a normal mood and affect. Her behavior is normal.   Vitals reviewed.      Pertinent Data:   Lab Results (last 72 hours)     Procedure Component Value Units Date/Time    Comprehensive Metabolic Panel [[id number]]  (Abnormal) Collected:  08/02/19 2010    Specimen:  Blood Updated:  08/02/19 2028     Glucose 130 mg/dL      BUN 29 mg/dL      Creatinine 0.90 mg/dL      Sodium 142 mmol/L      Potassium 3.6 mmol/L      Chloride 106 mmol/L      CO2 25.0 mmol/L      Calcium 9.4 mg/dL      Total Protein 7.4 g/dL      Albumin 4.60 g/dL      ALT (SGPT) 25 U/L      AST (SGOT) 28 U/L      Alkaline Phosphatase 111 U/L      Total Bilirubin 0.6 mg/dL      eGFR Non African Amer 59 mL/min/1.73      Globulin 2.8 gm/dL      A/G Ratio 1.6 g/dL      BUN/Creatinine Ratio 32.2     Anion Gap 11.0 mmol/L     Narrative:       GFR Normal >60  Chronic Kidney Disease <60  Kidney Failure <15    aPTT [[id number]]  (Normal) Collected:  08/02/19 2010    Specimen:  Blood Updated:  08/02/19 2027     PTT 28.4 seconds      Protime-INR [[id number]]  (Abnormal) Collected:  08/02/19 2010    Specimen:  Blood Updated:  08/02/19 2027     Protime 12.1 Seconds      INR 0.87    CBC Auto Differential [[id number]]  (Normal) Collected:  08/02/19 2010    Specimen:  Blood Updated:  08/02/19 2021     WBC 7.36 10*3/mm3      RBC 4.26 10*6/mm3      Hemoglobin 13.0 g/dL      Hematocrit 37.9 %      MCV 89.0 fL      MCH 30.5 pg      MCHC 34.3 g/dL      RDW 12.9 %      RDW-SD 41.7 fl      MPV 9.2 fL      Platelets 165 10*3/mm3      Neutrophil % 71.2 %      Lymphocyte % 17.0 %      Monocyte % 8.4 %      Eosinophil % 2.2 %      Basophil % 0.7 %      Immature Grans % 0.5 %      Neutrophils, Absolute 5.24 10*3/mm3      Lymphocytes, Absolute 1.25 10*3/mm3      Monocytes, Absolute 0.62 10*3/mm3      Eosinophils, Absolute 0.16 10*3/mm3      Basophils, Absolute 0.05 10*3/mm3      Immature Grans, Absolute 0.04 10*3/mm3      nRBC 0.0 /100 WBC         Imaging Results (last 24 hours)     Procedure Component Value Units Date/Time    CT Maxillofacial With Contrast [[id number]] Collected:  08/02/19 2126     Updated:  08/02/19 2152    Addenda:        Addendum:     CT MAXILLOFACIAL W CONT-      8/2/2019 8:56 PM CDT  3. in the summary should read Nondisplaced left zygomatic arch fracture.  This report was finalized on 08/02/2019 21:48 by Dr. Aly Morillo MD.  Signed:  08/02/19 2148 by Jairon Morillo MD    Narrative:       EXAMINATION: CT MAXILLOFACIAL W CONT-      8/2/2019 8:56 PM CDT     HISTORY: Fall injury. Left face and orbit injury.     In order to have a CT radiation dose as low as reasonably achievable  Automated Exposure Control was utilized for adjustment of the mA and/or  KV according to patient size.     DLP in mGycm= 268.     Post contrast axial CT imaging of the orbits.     Axial and coronal facial bone imaging.     Slight left proptosis. No retro-orbital hematoma..     No abnormal disruption.     Focal soft tissue air just lateral to the left eye indicates  laceration.     Left orbit floor fracture with no evidence of entrapment.  Left medial orbit wall fracture.     Comminuted depressed fracture of the anterior and posterior wall of the  left maxillary sinus.     Nondisplaced left zygomatic arch fracture.     High-grade degenerative disease at the left temporomandibular joint.     Clear mastoid air cells.     Summary:  1. Complex left facial fractures including the floor and medial wall of  the left orbit. No labral disruption.  2. Comminuted depressed fractures of the roof and anterior wall of the  left maxillary sinus. Posterior wall left maxillary sinus fracture.  3. Nondisplaced left second metatarsal fracture.      This report was finalized on 08/02/2019 21:30 by Dr. Aly Morillo MD.    CT Orbits With Contrast [[id number]] Collected:  08/02/19 2132     Updated:  08/02/19 2139    Narrative:       EXAMINATION: CT ORBITS W CONTRAST-     8/2/2019 8:56 PM CDT     HISTORY: Fall injury. Head and face trauma.     Postcontrast ORBIT CT.     In order to have a CT radiation dose as low as reasonably achievable  Automated Exposure Control was utilized for adjustment of the mA and/or  KV according to patient size.     DLP in mGycm= 268.     Left periorbital soft tissue injury with soft tissue air indicating  laceration.   No active arterial hemorrhage.     Mild left proptosis.  No retro-orbital hematoma.  No eyeball disruption.     Complex fractures involving the floor and medial wall of the left orbit.  Complex fractures involving the anterior and posterior walls of left  maxillary sinus.     Left maxillary sinus is opacified with blood.     Summary:  1. Left orbit and periorbital injury.  2. No abnormal disruption or retro-orbital hematoma.      This report was finalized on 08/02/2019 21:36 by Dr. Aly Morillo MD.    CT Cervical Spine Without Contrast [[id number]] Collected:  08/02/19 2130     Updated:  08/02/19 2134    Narrative:       EXAMINATION: CT CERVICAL SPINE WO  CONTRAST-      8/2/2019 8:56 PM CDT     HISTORY: Fall injury. Head and neck trauma.     In order to have a CT radiation dose as low as reasonably achievable  Automated Exposure Control was utilized for adjustment of the mA and/or  KV according to patient size.     DLP in mGycm= 210.     Noncontrast cervical spine CT.     Curvature and alignment is appropriate.     Disc space narrowing greatest at C5-6 and C6-7.  Hypertrophic anterior left endplate spurring at C6-7.     Facet joints align appropriately.     Summary:  1. Extensive degenerative changes with no acute fracture.     This report was finalized on 08/02/2019 21:31 by Dr. Aly Morillo MD.    CT Head Without Contrast [[id number]] Collected:  08/02/19 2124     Updated:  08/02/19 2127    Narrative:       EXAMINATION: CT HEAD WO CONTRAST-      8/2/2019 8:56 PM CDT     HISTORY: fall, head injury     In order to have a CT radiation dose as low as reasonably achievable  Automated Exposure Control was utilized for adjustment of the mA and/or  KV according to patient size.     DLP in mGycm= 622.     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.     No intracranial hemorrhage.     Ventricle size is appropriate.     Old right frontal craniotomy.     No skull fracture.     Summary:  1. No acute intracranial abnormality.         This report was finalized on 08/02/2019 21:24 by Dr. Aly Morillo MD.          I have personally reviewed and interpreted the radiology studies and ECG obtained at time of admission.     Assessment / Plan     Assessment:   Active Hospital Problems    Diagnosis   • **Closed head injury   • Closed extensive facial fractures (CMS/HCC)   • Acute pain due to injury   • Essential hypertension   • History of stroke   • Abrasion of left cornea   • Stage 2 moderate COPD by GOLD classification (CMS/HCC)       Plan:   1.  Admit as inpatient  2.  N.p.o. with ice chips until midnight  3.  ENT plans for surgical repair in a.m.  4.  Ophthalmology consulted will see in a.m.  5.   Erythromycin ointment to left eye every 2 hours  6.  Labs in a.m.  7.  DVT prophylaxis with SCDs  8.  Gentle hydration normal saline 75 mL/hour  9.  Hydralazine 10 mg IV every 6 hours as needed for systolic blood pressure greater than 160  10.  Home medications reviewed, all on hold for now  11.  Supplemental oxygen-uses 2 L at bedtime    I discussed the patient's findings and my recommendations with: Jose Armando Owens DO  Time spent: 40 minutes      WILBER Snow  08/02/19   11:26 PM

## 2019-08-03 NOTE — ED NOTES
VISUAL ACUITY-  LEFT EYE 20/30  RIGHT EYE 20/20  BOTH EYES 20/20     Karie García RN  08/02/19 2031

## 2019-08-03 NOTE — CONSULTS
Alex Rm Jr, MD     OTOLARYNGOLOGY, FACIALTRAUMA CONSULTATION NOTE   Referring Provider: Nixon Solis DO  Reason for Consultation: Facial fracture  Location: 341/1  Accompanied by: Daughter, son  Chief complaint:   Chief Complaint   Patient presents with   • Fall   • Facial Injury     HISTORY OF PRESENT ILLNESS:   Angelia Munoz is a  86 y.o. female who was admitted through the ER for Facial fracture after fall yesterday PM.  The patient was apparently unconscious for 20 secs.  She denies blood thinners.  No double vision after injury. Eye OS is too swollen for her to see well this AM.  She feels she tripped over a small edge. She hit face on concrete.    Review of Systems  Review of Systems: 14 point review carried out  Negative except facial trauma    History  Past Medical History:   Diagnosis Date   • COPD (chronic obstructive pulmonary disease) (CMS/HCC)    • History of meningioma    • Hypertension    • Stroke (CMS/HCC), right frontal region 02/2018     Past Surgical History:   Procedure Laterality Date   • BREAST BIOPSY     • CRANIOTOMY Right 2010   • EYE SURGERY     • GALLBLADDER SURGERY     • HYSTERECTOMY       Family History   Problem Relation Age of Onset   • No Known Problems Mother    • Emphysema Father    • Heart disease Father      Social History     Tobacco Use   • Smoking status: Never Smoker   • Smokeless tobacco: Never Used   Substance Use Topics   • Alcohol use: Yes     Comment: occas   • Drug use: No     Past Medical History:    reviewed with patient    reviewed in chart  Past Surgical History:    reviewed with patient    reviewed in chart  Social History:    reviewed with patient    reviewed in chart  Family History:    reviewed with patient    reviewed in chart  Tobacco Use:    reviewed with patient    reviewed in chart    Current Medicines Reviewed    Allergies:  Ciprofloxacin; Codeine; Sulfa antibiotics; and Tekturna [aliskiren]    Vital Signs   Temp:  [97.5 °F (36.4 °C)-98.4 °F  (36.9 °C)] 98.4 °F (36.9 °C)  Heart Rate:  [74-88] 88  Resp:  [16-18] 16  BP: (136-190)/(63-94) 154/69    Physical Exam  EXAMINATION:   CONSTITIONAL: well nourished, well-developed, alert, oriented, in no acute distress     BODY HABITUS: Normal body habitus     COMMUNICATION: able to communicate normally, normal voice quality    HEAD:   scalp contusion- L temple area    FACE: Facial trauma:   forehead:    cheek-     temple-     eyebrow-    tenderness present-  moderate    edema present- mod L upper quadrant face    SALIVARY GLANDS: parotid glands with no tenderness, no swelling, no masses, submandibular glands with normal size, nontender     EYE: OD    Color:  blue    Pupils- PERRL    Conjunctiva- clear    Sclera-  non-icteric      EOM-  intact    Eyelids- normal  OS    Color:  blue    Pupils- PERRL    Conjunctiva- chemosis and mild    Sclera-  non-icteric      Cornea- clear, non-ulcerative    EOM-  left-  paresis-   lateral    Proptosis:   Left- mild    HEARING: response to conversational voice normal    EARS: normal pinna with no lesions, Canals normal size and shape, Tympanic membranes normal, Ossicular chain intact, Middle ear clear     NOSE EXTERNAL: APPEARANCE: normal, straight, with good projection, no tenderness, no lesions, no tenderness, good nasal support, patent nares    NOSE INTERNAL: Anterior rhinoscopy performed  NASALMUCOSA:    intact mucosa, no lesions  Right    abnormal:        Left- edema, bluish  NASAL PASSAGES:     normal, patent  Right    abnormal   Left- high obstruction    Obstructed:   Left  NASAL VALVE:     abnormal  Bilateral- weak   SEPTUM:     mucosa abnormal   Left- bluish    deviation present:      deviated Right mid mild to mod  INFERIOR TURBINATES:     normal size, non-obstructing, no lesions  Right    abnormal  Left- bluish and boggy  SECRETIONS:     normal amount, clear    ORAL CAVITY: Normal lips with no lesions, dentition normal for age, FOM intact without lesions and normal  salivary flow, Mucosa intact without lesions, Hard and soft palate normal without lesions    OROPHARYNX: oropharyngeal mucosa normal, tonsil with normal appearance    NECK: normal appearance, no masses, no lesions, larynx normal mobility, trachea midline    LYMPH NODES : no adenopathy    THYROID: no overt thyromegaly, no tenderness, nodules or mass present on palpation, position midline    CHEST/RESPIRATORY: respiratory effort normal, no rales, rubs or wheezing, no stridor, normal appearance to chest    CARDIOVASCULAR: regular rate and rhythm, no murmurs, gallups, no peripheral edema    NEUROPSYCHIATRIC: oriented appropriately for age, mood normal, affect appropriate, cranial nerves intact grossly (unless specifically described), gait normal for age    FACIAL BONES: Facial bones inact, aligned and without palpable fracture Right  no tenderness present  Right  Facial trauma:   Frontal bone:       LEFT: intact  Orbital rims:       LEFT: lateral and inferior depressed posteriro and inferior    Canthus:       Medial         LEFT: edema but appears intact      Lateral:          LEFT: ;igament intact, posterior displaced  Malar eminence:       LEFT: depressed  Zygoma:       LEFT: fracture not palpable  Maxilla:       LEFT: fracture palpable anterior, stable b/c intact right side  Mandible:      intact  tenderness present-  moderate and l upper face    edema present- Lupper face with ecchymosis    RESULTS REVIEW:    I have personally reviewed the patient's ct scan of the facial bones images.  I have personally reviewed the patient's ct scan of the facial bones report.   CT [facility] 8/2/2019    ASSESSMENT:   1. Facial fracture- Complex displaced L upper skeleton involving Lateral, inferior and floor of orbit, Anterior and posterior butress of maxilla, L lateral nasal bone, L zygoma    Hospital Problem List     * (Principal) Closed head injury    Stage 2 moderate COPD by GOLD classification (CMS/HCC)    COPD is  unchanged.  Peak flow monitoring handout given.          Closed extensive facial fractures (CMS/[facility])    Acute pain due to injury    Essential hypertension    Hypertension is unchanged.  Continue current medications.  Blood pressure will be reassessed at the next regular appointment.      History of stroke    Abrasion of left cornea    Facial fracture due to fall, closed, initial encounter (CMS/[facility])    Overview Signed 8/3/2019  8:34 AM by Alex Rm Jr., MD     CT scan confirmation  Lateral orbital fracture, displaced  Medial I/O rim fracture displaced posteriorly  L lateral nasal fracture, displaced  Medial and lateral maxillary wall fracture, including butresses  Malar depression  Minimal Zygoma displacement         Contusion of face, scalp, and neck, initial encounter    Overview Signed 8/3/2019  8:34 AM by Alex Rm Jr., MD     Due to fall         Edema of face    Overview Signed 8/3/2019  8:36 AM by Alex Rm Jr., MD     After fall           Edema of face limits surgery at this time. I recommend allowing edema to resolve, then plan surgery to repair fractures.  PLAN:   I have explained the risks, benefits and alternatives to the patient/patient’s representative, in clear and simple language.  Options: Do nothing, Surgical repair of the facial Fractures L side.  Time was allowed for questions.  Risks of procedure include but are not limited to:    As a result of this procedure being performed, the material risks generally recognized are INFECTION, ALLERGIC REACTION, SEVERE LOSS OF BLOOD, LOSS OR LOSS OF FUNCTION OF ANY LIMB OR ORGAN, PARALYSIS OR PARTIAL PARALYSIS, PARAPLEGIA OR QUADRIPLEGIA, DISFIGURING SCAR, BRAIN DAMAGE, CARDIAC ARREST OR DEATH, BLOOD LOSS NECESSITATING TRANSFUSION WHICH CARRIES THE RISK OF EXPOSURE TO AIDS, HEPATITIS OR OTHER INFECTIOUS DISEASES.      Procedure: ORIF facial fractures Left face    Risks specific for procedure:   Facial scarring, facial injury  to nerves and muscles, Eye injury, Blindness, facial asymmetry, difficulty chewing, tooth loss  No guarantees of outcome given or implied  Patient, Son and Daughter demonstrate understanding    Patient does wish to  proceed with proposed procedure    I recommend discharge home when stable. I will await edema resolution and plan surgery later in week.    Patient understands and agrees with the treatment plan as described.    Following patient as in-patient. Further recommendations will be made based on serial examinations.    Medications/Orders for this encounter: Medications ordered- Decadron, Afrin, saline   Orders-  No nose blowing, Ice to L face, advance diet       Alex Rm Jr, MD  08/03/19  8:36 AM

## 2019-08-03 NOTE — PROGRESS NOTES
Discharge Planning Assessment  [facility]     Patient Name: Angelia Munoz  MRN: [id number]  Today's Date: 8/3/2019    Admit Date: 8/2/2019    Discharge Needs Assessment     Row Name 08/03/19 1209       Living Environment    Lives With  alone    Current Living Arrangements  home/apartment/condo    Primary Care Provided by  self    Provides Primary Care For  no one    Family Caregiver if Needed  child(margarito), adult    Quality of Family Relationships  helpful;involved;supportive    Able to Return to Prior Arrangements  yes       Resource/Environmental Concerns    Resource/Environmental Concerns  none    Transportation Concerns  car, none       Transition Planning    Patient/Family Anticipates Transition to  home with family    Patient/Family Anticipated Services at Transition  none    Transportation Anticipated  family or friend will provide       Discharge Needs Assessment    Readmission Within the Last 30 Days  no previous admission in last 30 days    Concerns to be Addressed  no discharge needs identified;denies needs/concerns at this time    Equipment Currently Used at Home  none    Anticipated Changes Related to Illness  none    Equipment Needed After Discharge  none    Discharge Coordination/Progress  Pt has RX coverage and a PCP. Pt plans on discharging home with familly today. Pt has plans for surgery next Thursday. Pt denies any needs or concerns at this time. SW will follow for any discharge needs.         Discharge Plan    No documentation.       Destination      No service coordination in this encounter.      Durable Medical Equipment      No service coordination in this encounter.      Dialysis/Infusion      No service coordination in this encounter.      Home Medical Care      No service coordination in this encounter.      Therapy      No service coordination in this encounter.      Community Resources      No service coordination in this encounter.        Expected Discharge Date and Time     Expected  Discharge Date Expected Discharge Time    Aug 3, 2019         Demographic Summary    No documentation.       Functional Status    No documentation.       Psychosocial    No documentation.       Abuse/Neglect    No documentation.       Legal    No documentation.       Substance Abuse    No documentation.       Patient Forms    No documentation.           Elva Gaytan

## 2019-08-03 NOTE — ED NOTES
PATIENT ADMITTED TO ROOM 341, REPORT TO BE GIVEN AT BEDSIDE.      Karie García RN  08/02/19 0041

## 2019-08-03 NOTE — DISCHARGE SUMMARY
[facility] Medicine Services  DISCHARGE SUMMARY   Date of Admission: 8/2/2019  Date of Discharge:  8/3/2019  Primary Care Physician: Ancelmo Carrillo MD    Presenting Problem/History of Present Illness:  Fall with left facial fractures, left orbit, left maxillary sinus    Discharge Diagnoses:  Active Hospital Problems    Diagnosis   • **Facial fracture due to fall, closed, initial encounter (CMS/Formerly [facility])     CT scan confirmation  Lateral orbital fracture, displaced  Medial I/O rim fracture displaced posteriorly  L lateral nasal fracture, displaced  Medial and lateral maxillary wall fracture, including butresses  Malar depression  Minimal Zygoma displacement     • Contusion of face, scalp, and neck, initial encounter     Due to fall     • Edema of face     After fall     • Closed extensive facial fractures (CMS/[facility])   • Acute pain due to injury   • Essential hypertension   • History of stroke   • Abrasion of left cornea   • Stage 2 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     History of Present Illness on Day of Discharge:   Lying in bed.  2 daughters and son present in room.  She denies chest pain, palpitations or shortness of breath.  She denies blurred vision or double vision.  She denies headache.  She has been able to ambulate to the bathroom with assistance of nursing staff.  She has been seen by ENT and ophthalmology today and both have cleared for discharge.  Family members are anxious to take her home today.  Family will be staying with her after discharge.  Plans for surgical intervention by ENT end of next week.  Patient reports she tripped on concrete when walking to nursing home to see friend.  She denied syncopal episode or lightheadedness prior to fall.    Consults:   1.  Dr. Rm,ENT  2.  Dr. Jose Antonio Cleveland, ophthalmology    Procedures Performed: None    Pertinent Test Results:    Laboratory Data:    Results from last 7 days   Lab Units 08/03/19  0504 08/02/19 2010   WBC 10*3/mm3 11.64* 7.36   HEMOGLOBIN g/dL 12.5 13.0   HEMATOCRIT % 36.1* 37.9   PLATELETS 10*3/mm3 161 165        Results from last 7 days   Lab Units 08/03/19  0504 08/02/19 2010   SODIUM mmol/L 139 142   POTASSIUM mmol/L 3.8 3.6   CHLORIDE mmol/L 105 106   CO2 mmol/L 26.0 25.0   BUN mg/dL 24* 29*   CREATININE mg/dL 0.81 0.90   CALCIUM mg/dL 9.3 9.4   BILIRUBIN mg/dL  --  0.6   ALK PHOS U/L  --  111   ALT (SGPT) U/L  --  25   AST (SGOT) U/L  --  28   GLUCOSE mg/dL 139* 130*     Results from last 7 days   Lab Units 08/02/19 2010   INR  0.87*     Imaging Results (all)     Procedure Component Value Units Date/Time    CT Maxillofacial With Contrast [[id number]] Collected:  08/02/19 2126     Updated:  08/02/19 2152    Addenda:        Addendum:     CT MAXILLOFACIAL W CONT-      8/2/2019 8:56 PM CDT  3. in the summary should read Nondisplaced left zygomatic arch fracture.  This report was finalized on 08/02/2019 21:48 by Dr. Aly Morillo MD.  Signed:  08/02/19 2148 by Jairon Morillo MD    Narrative:       EXAMINATION: CT MAXILLOFACIAL W CONT-      8/2/2019 8:56 PM CDT     HISTORY: Fall injury. Left face and orbit injury.     In order to have a CT radiation dose as low as reasonably achievable  Automated Exposure Control was utilized for adjustment of the mA and/or  KV according to patient size.     DLP in mGycm= 268.     Post contrast axial CT imaging of the orbits.     Axial and coronal facial bone imaging.     Slight left proptosis. No retro-orbital hematoma..     No abnormal disruption.     Focal soft tissue air just lateral to the left eye indicates laceration.     Left orbit floor fracture with no evidence of entrapment.  Left medial orbit wall fracture.     Comminuted depressed fracture of the anterior and posterior wall of the  left maxillary sinus.     Nondisplaced left zygomatic arch fracture.     High-grade  degenerative disease at the left temporomandibular joint.     Clear mastoid air cells.     Summary:  1. Complex left facial fractures including the floor and medial wall of  the left orbit. No labral disruption.  2. Comminuted depressed fractures of the roof and anterior wall of the  left maxillary sinus. Posterior wall left maxillary sinus fracture.  3. Nondisplaced left second metatarsal fracture.     This report was finalized on 08/02/2019 21:30 by Dr. Aly Morillo MD.    CT Orbits With Contrast [[id number]] Collected:  08/02/19 2132     Updated:  08/02/19 2139    Narrative:       EXAMINATION: CT ORBITS W CONTRAST-     8/2/2019 8:56 PM CDT     HISTORY: Fall injury. Head and face trauma.     Postcontrast ORBIT CT.     In order to have a CT radiation dose as low as reasonably achievable  Automated Exposure Control was utilized for adjustment of the mA and/or  KV according to patient size.     DLP in mGycm= 268.     Left periorbital soft tissue injury with soft tissue air indicating  laceration.   No active arterial hemorrhage.     Mild left proptosis.  No retro-orbital hematoma.  No eyeball disruption.     Complex fractures involving the floor and medial wall of the left orbit.  Complex fractures involving the anterior and posterior walls of left  maxillary sinus.     Left maxillary sinus is opacified with blood.     Summary:  1. Left orbit and periorbital injury.  2. No abnormal disruption or retro-orbital hematoma.      This report was finalized on 08/02/2019 21:36 by Dr. Aly Morillo MD.    CT Cervical Spine Without Contrast [[id number]] Collected:  08/02/19 2130     Updated:  08/02/19 2134    Narrative:       EXAMINATION: CT CERVICAL SPINE WO CONTRAST-      8/2/2019 8:56 PM CDT     HISTORY: Fall injury. Head and neck trauma.     In order to have a CT radiation dose as low as reasonably achievable  Automated Exposure Control was utilized for adjustment of the mA and/or  KV according to patient size.     DLP in  mGycm= 210.     Noncontrast cervical spine CT.     Curvature and alignment is appropriate.     Disc space narrowing greatest at C5-6 and C6-7.  Hypertrophic anterior left endplate spurring at C6-7.     Facet joints align appropriately.     Summary:  1. Extensive degenerative changes with no acute fracture.   This report was finalized on 08/02/2019 21:31 by Dr. Aly Morillo MD.    CT Head Without Contrast [[id number]] Collected:  08/02/19 2124     Updated:  08/02/19 2127    Narrative:       EXAMINATION: CT HEAD WO CONTRAST-      8/2/2019 8:56 PM CDT     HISTORY: fall, head injury     In order to have a CT radiation dose as low as reasonably achievable  Automated Exposure Control was utilized for adjustment of the mA and/or  KV according to patient size.     DLP in mGycm= 622.     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.     No intracranial hemorrhage.     Ventricle size is appropriate.     Old right frontal craniotomy.     No skull fracture.     Summary:  1. No acute intracranial abnormality.   This report was finalized on 08/02/2019 21:24 by Dr. Aly Morillo MD.        Hospital Course  Patient is a 86 y.o. female presented to [facility] emergency room 8/2/2019 after a fall resulting in multiple facial fractures.  She was walking into a building at the nursing home where she was going to visit a friend, stubbed her toe on the concrete and fell forward on the left side of her face.  Friend reported that she was unconscious for 20-30 seconds.  Patient denied any dizziness, lightheadedness or presyncopal events prior to fall. Friend reported when she awoke she was at baseline.  She denied eye pain, blurred or double vision.  CT scan of the head no acute intracranial abnormality.  CT cervical spine noted extensive degenerative changes with no acute fracture.  CT orbits noted left orbital and periorbital injury.  No abnormal disruption or retro-orbital hematoma.  CT maxillofacial reported complex left facial fractures  including the floor and medial wall of the left orbit.  No labral disruption.  Comminuted depressed fractures of the roof and anterior wall of the left maxillary sinus.  Posterior wall left maxillary sinus fracture.  Nondisplaced left second metatarsal fracture.  She received morphine, Zofran azithromycin ointment in the emergency room.    ENT contacted by the emergency room department with plans for evaluation following day.  Dr. Cleveland, ophthalmology contacted by ER physician and requested erythromycin ointment to left eye every 2 hours and he was see patient in consultation the following morning.    She was admitted to the neurology floor with plans for ENT and ophthalmology evaluation.  Erythromycin to left eye continued as recommended by ophthalmology.  She was placed on gentle IV fluid hydration at 75 mL/h.  SCDs for deep vein thrombosis prophylaxis.  Home medications initially placed on hold until ENT evaluated to determine timing of surgical intervention.    Dr. Jose Antonio Cleveland, ophthalmology saw the patient on 8/3 and noted left orbital fracture.  Patient does have decreased extraocular motility left eye.  No radiographic evidence of entrapment.  Patient without symptomatic diplopia.  No retrobulbar hemorrhage.  Eye exam normal.  Abrasion noted in ER; however, not present upon examination by Dr. Cleveland.  Taper erythromycin to 3 times daily for 1 week to help with lubrication in the setting of conjunctival and periorbital swelling.  Follow-up with Dr. Cleveland 1 month.    ENT consulted and she was seen by Dr. Martinez on 8/3.  Patient denied double vision.  Left eye swollen.  Edema of the face limit surgery at this time.  Dr. Martinez recommended allowing edema to resolve and then plan surgical repair of fractures next week.  Additional recommendations included ice left side of face and elevate head of bed.  Hold aspirin until surgical procedure.  I discussed with Dr. Martinez and he noted that patient did not need  "anti-antibiotics. Patient to contact ENT office on 8/5 for office visit next week with planned surgical intervention on 8/8 if edema improved.    Patient was up with assistance of nursing staff in the room without any reported gait instability, lightheadedness or dizziness noted.  2 daughters and son present in the room and plan to stay with patient after discharge until surgical intervention by ENT next week.    On 8/3/2019 she is stable for discharge home.  She has been evaluated by ENT and ophthalmology and both have cleared for discharge.  Patient denies any blurred or double vision.  No antibiotics necessary per ENT.  Continue erythromycin ointment 3 times daily per ophthalmology.  Hold aspirin until surgical procedure by ENT.  Patient denies chest pain, palpitations, shortness of breath.  She denies nausea, vomiting or abdominal pain.  Ice pack has been placed to the left side of the face.  Have advised daughters to keep head of bed elevated.  She should follow-up with Dr. Carrillo within 1 week after discharge.  I discussed with patient and family members follow-up appointments with ENT next week and follow-up with Dr. Cleveland 4 weeks.    Physical Exam on Discharge:  /69 (BP Location: Left arm, Patient Position: Lying)   Pulse 88   Temp 98.4 °F (36.9 °C) (Oral)   Resp 16   Ht 175.3 cm (69\")   Wt 80.4 kg (177 lb 3.2 oz)   SpO2 97%   BMI 26.17 kg/m²   Physical Exam   Constitutional: She is oriented to person, place, and time. She appears well-developed and well-nourished.   HENT:   Head: Normocephalic and atraumatic.   Eyes: EOM are normal. Pupils are equal, round, and reactive to light.   Left facial and orbital bruising.  Left conjunctiva reddened.  Left eyelid swollen.   Neck: Normal range of motion. Neck supple.   Cardiovascular: Normal rate, regular rhythm, normal heart sounds and intact distal pulses. Exam reveals no friction rub.   No murmur heard.  Normal sinus rhythm 81-85 on telemetry "   Pulmonary/Chest: Effort normal and breath sounds normal. She has no wheezes. She has no rales.   No oxygen in use   Abdominal: Soft. Bowel sounds are normal. She exhibits no distension. There is no tenderness.   Genitourinary:   Genitourinary Comments: Voiding.   Musculoskeletal: She exhibits edema (Left facial and orbital edema).   Neurological: She is alert and oriented to person, place, and time.   Skin: Skin is warm and dry.   Left facial orbital bruising.  Skin abrasion left elbow   Psychiatric: Her behavior is normal. Judgment and thought content normal.     Condition on Discharge: Stable    Discharge Disposition: Home with family    Discharge Diet:   Diet Instructions     Advance Diet As Tolerated          Soft foods recommended.                  Activity at Discharge:   Activity Instructions     Activity as Tolerated      Additional Activity Instructions:      1.  Ice to left side of face and eye region per ENT.  2.  Keep head of bed elevated.                      Discharge Care Plan / Instructions:   1.  Ice to left side of face and eye region per ENT.  2.  Keep head of bed elevated.  3.  Discontinue aspirin until surgical procedure by ENT next week.  4.  Call Dr. Rm's office on 8/5/19 for follow-up appointment and planned elective surgical procedure on 8/8/2019.  4.  Follow-up with Dr. Jose Antonio Cleveland 4 weeks.  5.  Erythromycin ointment left eye 3 times daily per Dr. Cleveland.    Discharge Medications:     Discharge Medications      New Medications      Instructions Start Date   erythromycin 5 MG/GM ophthalmic ointment  Commonly known as:  ROMYCIN   Left Eye, Every 8 Hours Scheduled      HYDROcodone-acetaminophen 5-325 MG per tablet  Commonly known as:  NORCO   1 tablet, Oral, Every 6 Hours PRN      promethazine 25 MG tablet  Commonly known as:  PHENERGAN   1/2 to 1 tablet every 6 hours as needed for nausea or vomiting.      sodium chloride 0.65 % nasal spray   2 sprays, Nasal, 5 Times Daily      sodium  chloride 0.65 % nasal spray   2 sprays, Nasal, Continuous PRN         Continue These Medications      Instructions Start Date   allopurinol 100 MG tablet  Commonly known as:  ZYLOPRIM   100 mg, Oral, Daily      amLODIPine 2.5 MG tablet  Commonly known as:  NORVASC   5 mg, Oral, Daily      BENICAR 20 MG tablet  Generic drug:  olmesartan   20 mg, Oral      CloNIDine 0.1 MG tablet  Commonly known as:  CATAPRES   0.1 mg, Oral, 2 Times Daily PRN      hydrochlorothiazide 12.5 MG capsule  Commonly known as:  MICROZIDE   12.5 mg, Oral, Daily      nebivolol 10 MG tablet  Commonly known as:  BYSTOLIC   5 mg, Oral, 2 Times Daily      O2  Commonly known as:  OXYGEN   2 L/min, Inhalation, Nightly         Stop These Medications    aspirin  MG tablet          Follow-up Appointments:   Follow-up Information     Ancelmo Carrillo MD Follow up.    Specialty:  Internal Medicine  Why:  Please call and schedule an appointment 1 week from discharged date  Contact information:  [address] [facility] [address]  [place] [place] [address]  [phone]             Nixon Triplett MD Follow up.    Specialty:  Neurology  Why:  January 29, 2020 @ 10:30 am  Contact information:  [address]  [place] [address]  [phone]             Fred Cleveland MD Follow up.    Specialty:  Ophthalmology  Why:  Will call with date and time of appointment   Contact information:  [address] [address]  [phone]             Alex Rm Jr., MD Follow up.    Specialty:  Otolaryngology  Why:   Call office 8/5/19 for appointment   Contact information:  [address]  [place] [place] [address]  [phone]                 Future Appointments:  Future Appointments   Date Time Provider Department Center   10/21/2019  3:30 PM Stephen Cope MD [facility] None   1/29/2020 10:30 AM Nixon Triplett MD [facility] None     Test Results Pending at Discharge: None.    The above documentation resulted from  a face-to-face encounter by me Marianna MERINO, [facility].    Marianna ZARAGOZAWILBER Huggins  08/03/19  11:45 AM    Time: This discharge process required 40 minutes for completion.     Plan discussed with Dr. Solis, Dr. Fred Cleveland, Dr. Rm, patient, 2 daughters and son.    Time spent in face-to-face evaluation, chart review, planning and education 40 minutes.    I personally evaluated and examined the patient in conjunction with WILBER Mayfield and agree with the assessment, treatment plan, and disposition of the patient as recorded by her. My history, exam, and further recommendations are:     Seen and discussed with her nurses, Lee Ann/Barbara.     Seen and discussed with her daughter, Leora De Leon, who incidentally is my neighbor.     She was going to visit one of her friends at [facility] last night and tripped on the threshold of one of the doors.  She fell and struck the left side of her face.  She has sustained significant bruising and edema.  She has also sustained fractures to her left orbit, left maxillary sinus, and left zygomatic arch.    She has been evaluated by Dr. Fred Cleveland with ophthalmology.  No problems found with her globe.    She has been evaluated by Dr. Rm with ENT.  He plans to have her back on Thursday for fracture repair after with her swelling subsides.    I will provide a prescription for Norco in case she needs something stronger for pain and Tylenol.  She believes that she tolerated this medication in the past with her prior surgery.  She has felt a little nauseous since Marianna saw her and I will provide her with a prescription for promethazine.    I provided her daughter, Leora, with my business card and she does also have my cell phone number.  They can call me with any problems or concerns.    Nixon Solis,   08/03/19  12:33 PM

## 2019-08-03 NOTE — CONSULTS
Ophthalmology Consult Note    Referring Provider: Emergency Department, ENT  Reason for Consultation: Left orbital fracture, possible corneal abrasion.     Patient Care Team:  Ancelmo Carrillo MD as PCP - General  Ancelmo Carrillo MD as PCP - Family Medicine  [place], Nixon Mcnally MD as PCP - Claims Attributed  [employer]  Stephen Cope MD as Consulting Physician (Pulmonary Disease)    Chief complaint left eye pain    Subjective .     History of present illness:  86 y.o. white female presented last night after a fall with significant left eye swelling and left eye pain. Found to have extensive orbital fractures on CT scan. Was able to read vision chart well and denied any vision changes in the ER.  There was some concerns about possible K abrasion but pt denies any severe left eye pain, just soreness around her eye. Pt states pain and soreness is some better this AM.  Denies any double vision. Denies any headache, nausea, vomiting, chest pain, shortness of breath.     Review of Systems  Pertinent items are noted in HPI, all other systems reviewed and negative    History  Past Medical History:   Diagnosis Date   • COPD (chronic obstructive pulmonary disease) (CMS/HCC)    • History of meningioma    • Hypertension    • Stroke (CMS/HCC), right frontal region 02/2018   ,   Past Surgical History:   Procedure Laterality Date   • BREAST BIOPSY     • CRANIOTOMY Right 2010   • EYE SURGERY     • GALLBLADDER SURGERY     • HYSTERECTOMY     ,   Family History   Problem Relation Age of Onset   • No Known Problems Mother    • Emphysema Father    • Heart disease Father    ,   Social History     Tobacco Use   • Smoking status: Never Smoker   • Smokeless tobacco: Never Used   Substance Use Topics   • Alcohol use: Yes     Comment: occas   • Drug use: No   ,   Medications Prior to Admission   Medication Sig Dispense Refill Last Dose   • allopurinol (ZYLOPRIM) 100 MG tablet Take 100 mg by mouth Daily.   Taking   •  amLODIPine (NORVASC) 2.5 MG tablet Take 5 mg by mouth Daily.   Taking   • aspirin  MG tablet Take 325 mg by mouth Daily.   Taking   • CloNIDine (CATAPRES) 0.1 MG tablet Take 0.1 mg by mouth 2 (Two) Times a Day As Needed.   Taking   • hydrochlorothiazide (MICROZIDE) 12.5 MG capsule Take 12.5 mg by mouth Daily.  1 Taking   • nebivolol (BYSTOLIC) 10 MG tablet Take 5 mg by mouth 2 (Two) Times a Day.   Taking   • O2 (OXYGEN) Inhale 2 L/min Every Night.   Taking   • olmesartan (BENICAR) 20 MG tablet Take 20 mg by mouth.   Taking    and Allergies:  Ciprofloxacin; Codeine; Sulfa antibiotics; and Tekturna [aliskiren]    Objective     Vital Signs   Temp:  [97.5 °F (36.4 °C)-98.4 °F (36.9 °C)] 98.4 °F (36.9 °C)  Heart Rate:  [74-88] 88  Resp:  [16-18] 16  BP: (136-190)/(63-94) 154/69    Physical Exam:  Mental Status: Awake, alert, and oriented x 3    Visual acuity: near vision without readers, recently with ointment in left  eye   Right eye: 20/40   Left eye: Counts fingers at 2 feet without difficulty    Intraocular Pressure (obtained with [facility])   Right eye: 20    Left eye: 24    Pupils: Pupils equally round and reactive to light and accomodation, No RAPD.    Confrontational Visual fields: Full OU     Extraocular movements: Full OD, restricted VF in all directions of gaze OS, particularly down gaze. Denies any double vision when left eyelid lifted to allow binocular viewing.     External:  Within normal limits OD, 3+ edema and ecchymoses of LUCIO and LLL as well as entire left side of face, Swelling is causing eye to stay closed without manually lifting lid to open eye.     Penlight exam:   Lids/lashes: within normal limits OD, 3+ edema and eccyhymoses of LUCIO and LLL  Conjunctiva/Sclera: white and quiet OD, 3+ Subconj heme of left eye.   Cornea: Clear OU, no abrasion noted OS.   Anterior chamber: Formed OU  Iris: Round and regular OU  Lens: pseudophakic OU    Dilated Fundus Exam, left eye: Left eye dilated with 1%  tropicamide and 2.5 % phenylephrine at 10:15 AM   Vitreous: clear OU  Optic nerve: pink and healthy, c/d ratio 0.55  Macula: Normal  OU  Vessels: Normal OU  Periphery: Retina attached 360 degrees without any tears/breaks    Results Review:   I reviewed the patient's new clinical results.    CT Maxillofacial: (8/2/19)  Summary:  1. Complex left facial fractures including the floor and medial wall of  the left orbit. No labral disruption.  2. Comminuted depressed fractures of the roof and anterior wall of the  left maxillary sinus. Posterior wall left maxillary sinus fracture.  3. Nondisplaced left second metatarsal fracture.Summary:  1. Complex left facial fractures including the floor and medial wall of  the left orbit. No labral disruption.  2. Comminuted depressed fractures of the roof and anterior wall of the  left maxillary sinus. Posterior wall left maxillary sinus fracture.  3. Nondisplaced left second metatarsal fracture.    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 Pt without symptomatic diplopia at this time. May be related to extensive soft tissue swelling. Planning surgical repair with ENT later this week. Agree with surgical intervention for this case.  No retrobulbar hemorrhage and IOP just slightly elevated above normal (no need for IOP lowering meds at this time).  Rest of eye exam stable with normal posterior segment exam wnl.       2) K abrasion- noted in ER, however, not present on my exam today. Will  taper e-mycin lizbeth to tid for 1 week to help with lubrication in setting of conjunctival and periorbital swelling.     Plan to f/u with pt as outpatient in 1 month. Pt may call [phone] to schedule.     I discussed the patients findings and my recommendations with patient, family and primary care team    Fred Cleveland MD  08/03/19  10:32 AM

## 2019-08-03 NOTE — PLAN OF CARE
Problem: Patient Care Overview  Goal: Plan of Care Review  Outcome: Ongoing (interventions implemented as appropriate)   08/03/19 0512   Coping/Psychosocial   Plan of Care Reviewed With patient   Plan of Care Review   Progress no change   OTHER   Outcome Summary patient with L side of face and L eye swollen, red, bruised and with a small abrasion on L cheek, c/o only of mild pain and declines pain medicine, antibiotic eye ointment given q2h, up with assist of one to BR, daughter at bedside, plan surgery today       Problem: Fracture Orthopaedic (Adult)  Goal: Signs and Symptoms of Listed Potential Problems Will be Absent, Minimized or Managed (Fracture Orthopaedic)  Outcome: Ongoing (interventions implemented as appropriate)      Problem: Pain, Acute (Adult)  Goal: Identify Related Risk Factors and Signs and Symptoms  Outcome: Ongoing (interventions implemented as appropriate)    Goal: Acceptable Pain Control/Comfort Level  Outcome: Ongoing (interventions implemented as appropriate)      Problem: Fall Risk (Adult)  Goal: Identify Related Risk Factors and Signs and Symptoms  Outcome: Ongoing (interventions implemented as appropriate)    Goal: Absence of Fall  Outcome: Ongoing (interventions implemented as appropriate)

## 2019-08-06 ENCOUNTER — TELEPHONE (OUTPATIENT)
Dept: OTOLARYNGOLOGY | Facility: CLINIC | Age: 84
End: 2019-08-06

## 2019-08-06 ENCOUNTER — OFFICE VISIT (OUTPATIENT)
Dept: OTOLARYNGOLOGY | Facility: CLINIC | Age: 84
End: 2019-08-06

## 2019-08-06 VITALS
BODY MASS INDEX: 25.36 KG/M2 | WEIGHT: 171.2 LBS | HEART RATE: 71 BPM | SYSTOLIC BLOOD PRESSURE: 119 MMHG | TEMPERATURE: 97 F | OXYGEN SATURATION: 98 % | DIASTOLIC BLOOD PRESSURE: 64 MMHG | HEIGHT: 69 IN

## 2019-08-06 DIAGNOSIS — S02.92XD CLOSED FRACTURE OF FACE BONES DUE TO FALL WITH ROUTINE HEALING, SUBSEQUENT ENCOUNTER: Primary | ICD-10-CM

## 2019-08-06 DIAGNOSIS — S00.03XA CONTUSION OF FACE, SCALP, AND NECK, INITIAL ENCOUNTER: ICD-10-CM

## 2019-08-06 DIAGNOSIS — S10.93XA CONTUSION OF FACE, SCALP, AND NECK, INITIAL ENCOUNTER: ICD-10-CM

## 2019-08-06 DIAGNOSIS — S00.83XA CONTUSION OF FACE, SCALP, AND NECK, INITIAL ENCOUNTER: ICD-10-CM

## 2019-08-06 DIAGNOSIS — H11.32 SUBCONJUNCTIVAL HEMATOMA, LEFT: ICD-10-CM
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
## 2019-08-06 DIAGNOSIS — S02.92XG: ICD-10-CM

## 2019-08-06 PROBLEM — S02.92XA CLOSED FRACTURE OF FACE BONES DUE TO FALL: Status: ACTIVE | Noted: 2019-08-06
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
## 2019-08-06 PROCEDURE — 99214 OFFICE O/P EST MOD 30 MIN: CPT | Performed by: OTOLARYNGOLOGY

## 2019-08-06 RX ORDER — OLMESARTAN MEDOXOMIL 40 MG/1
40 TABLET ORAL DAILY
Refills: 4 | COMMUNITY
Start: 2019-08-05

## 2019-08-06 RX ORDER — AMLODIPINE BESYLATE 5 MG/1
5 TABLET ORAL DAILY
COMMUNITY
Start: 2019-07-09

## 2019-08-06 NOTE — H&P (VIEW-ONLY)
Alex Rm Jr, MD     FOLLOW UP NOTE     Chief Complaint   Patient presents with   • facial fracture   • nasal fracture        HISTORY OF PRESENT ILLNESS:   Accompanied by:  Daughter  Angelia Munoz is a  86 y.o. female who is here for follow up. She is here for preop facial fracture.  She is not having double vision. She has mild numbness.  She has mild issues with chewing.  She is not having much pain.  She has had light appetite.  She is sleeping more per daughter.    Review of Systems  Reviewed per patient intake note and confirmed by me    Past History:  Past medical and surgical history, family history and social history reviewed and updated when appropriate.  Current medications and allergies reviewed and updated when appropriate.  Allergies:  Ciprofloxacin; Tekturna [aliskiren]; Codeine; and Sulfa antibiotics        Vital Signs:   Temp:  [97 °F (36.1 °C)] 97 °F (36.1 °C)  Heart Rate:  [71] 71  BP: (119)/(64) 119/64    EXAMINATION:   CONSTITIONAL: well nourished, well-developed, alert, oriented, in no acute distress     BODY HABITUS: Normal body habitus     COMMUNICATION: able to communicate normally, normal voice quality    HEAD:   Craniotomy-  R fronto-parietal area,   L facial and temple edema, ecchymosis    FACE: structure normal  Right  Facial trauma:   forehead:        LEFT: edema and ecchymosis  cheek-         LEFT: edema, ecchymosis, depressed medially, malar medially depressed  temple-         LEFT: ecchymosis, tenderness  tenderness present-  moderate    deformity present- L upper face    SALIVARY GLANDS: parotid glands with no tenderness, no swelling, no masses, submandibular glands with normal size, nontender     EYE: OD    Nomral, PRRL, EOMI, sclera non-icteric  OS    Pupils- PERRL    Conjunctiva- subconjuctival hemorrhage-  lateral    Sclera-  non-icteric      Cornea- clear, non-ulcerative    EOM-  intact    Eyelids- left-  ecchymosis and edema, palpebral fissure open but smaller than OD     Proptosis:   Left- mild    HEARING:   response to conversational voice normal with hearing aids    Hearing Aids present:  bilateral    EARS: Otoscopic exam   Bilateral  normal pinna with no lesions, Canals normal size and shape, Tympanic membranes normal, Ossicular chain intact, Middle ear clear     NOSE EXTERNAL: APPEARANCE: L nasal projection maxilla with ecchymosis  SKIN: normal thickness, intact without lesions or scarring  NASAL BONES:  Left- lateral palpable fracture, depressed  NARIS(ES): patent, symmetric, intact alar rim, good support   Bilateral  TIP: normal, symmetric, good projection of tip, LLC and domes intact  Lower lateral cartilage(s): normal, inact, symmetric,  good support  NASAL VALVE: intact, good support and no nasal obstruction   Bilateral  ALAR RIM(S): intact, normal structure and shape, no lesions  Bilateral  COLUMELLA: straight, intact with good support, no lesions, medial crura good position  SEPTUM CAUDAL: Straight with good support, not obstructing the nasal airway  FACIAL TENDERNESS:   Left- medial face    NOSE INTERNAL: Anterior rhinoscopy performed  NASALMUCOSA:    intact mucosa, no lesions  Right    abnormal: L side with mild ecchymosis present  NASAL PASSAGES:     normal, patent   NASAL VALVE:     intact, good support and no nasal obstruction  SEPTUM:     mucosa abnormal   Left- ecchymosis    Little's area:  Left- ecchymosis    midline  INFERIOR TURBINATES:     normal size, non-obstructing, no lesions  Right    abnormal  Left- ecchymosis, normal size  SECRETIONS:     normal amount, clear    ORAL CAVITY: Normal lips with no lesions, dentition normal for age, FOM intact without lesions and normal salivary flow, Mucosa intact without lesions, Hard and soft palate normal without lesions    OROPHARYNX: Direct examination  oropharyngeal mucosa normal, tonsil with normal appearance    NECK: normal appearance, no masses, no lesions, larynx normal mobility, trachea midline   Neck position:  lordotic    LYMPH NODES : no adenopathy    THYROID: no overt thyromegaly, no tenderness, nodules or mass present on palpation, position midline    CHEST/RESPIRATORY: respiratory effort normal, no rales, rubs or wheezing, no stridor, normal appearance to chest    CARDIOVASCULAR: regular rate and rhythm, no murmurs, gallups, no peripheral edema    NEUROPSYCHIATRIC: oriented appropriately for age, mood normal, affect appropriate, cranial nerves intact grossly (unless specifically described), gait normal for age    RESULTS REVIEW:    I have personally reviewed the patient's ct scan of the facial bones images.    Assessment      Problem List Items Addressed This Visit        Musculoskeletal and Integument    Closed extensive facial fractures (CMS/HCC)    Relevant Orders    Case Request (Completed)    Comprehensive Metabolic Panel    CBC and Differential    Facial fracture due to fall, closed, initial encounter (CMS/[facility]) - Primary    Contusion of face, scalp, and neck, initial encounter    Relevant Orders    Case Request (Completed)    Comprehensive Metabolic Panel    CBC and Differential      Other Visit Diagnoses     Subconjunctival hematoma, left              Plan      Medical and surgical options were discussed including medical and surgical options. Risks, benefits and alternatives were discussed and questions were answered. After considering the options, the patient decided to proceed with surgery.     -----SURGERY SCHEDULING:-----  Schedule: OPEN REDUCTION INTERNAL FIXATION Left orbital, malar and maxillary fracture with intra-ral incision, Eyelid incisions     ---INFORMED CONSENT DISCUSSION:---  OPEN REDUCTION INTERNAL FIXATION OF FACIAL FRACTURES AND REPAIR SOFT TISSUE INJURY: The risks and benefits were explained including but not limited to pain, bleeding, infection, the risks of the general anesthesia, damage to the eye, soft tissue, nerves of face and bones, poor healing, scarring of face, infection of  plates/implans, requirement for additional surgery, possible flap or skin graft closure.    Questions were answered. No guarantees were made or implied.       ---PREOPERATIVE WORKUP:---  labs/ workup per anesthesia  CBC  CMP  Chest X-Ray  EKG  PT/INR  PTT  Preoperative Medicine evaluation for clearance  Dr Carrillo     Return 10-12 days after surgery, for Recheck Face and surgical sites.    Alex Rm Jr, MD  8/6/2019  12:25 PM  Alex Rm Jr, MD  08/06/19  12:25 PM

## 2019-08-06 NOTE — PROGRESS NOTES
Soco Clark RN   Patient Intake Note    Review of Systems  Review of Systems   Constitutional: Positive for fatigue.   HENT: Positive for rhinorrhea and sore throat.    Eyes: Positive for redness and visual disturbance.   Respiratory: Negative.    Cardiovascular: Negative.    Gastrointestinal: Negative.    Endocrine: Negative.    Genitourinary: Negative.    Musculoskeletal: Negative.    Allergic/Immunologic: Negative.    Neurological: Negative.    Hematological: Negative.    Psychiatric/Behavioral: Positive for sleep disturbance.       QUALITY MEASURES    Tobacco Use: Screening and Cessation Intervention  Social History    Tobacco Use      Smoking status: Former Smoker      Smokeless tobacco: Never Used      Tobacco comment: quit 40 years ago        Soco Clark RN  8/6/2019  11:18 AM

## 2019-08-06 NOTE — PROGRESS NOTES
Alex Rm Jr, MD     FOLLOW UP NOTE     Chief Complaint   Patient presents with   • facial fracture   • nasal fracture        HISTORY OF PRESENT ILLNESS:   Accompanied by:  Daughter  Angelia Munoz is a  86 y.o. female who is here for follow up. She is here for preop facial fracture.  She is not having double vision. She has mild numbness.  She has mild issues with chewing.  She is not having much pain.  She has had light appetite.  She is sleeping more per daughter.    Review of Systems  Reviewed per patient intake note and confirmed by me    Past History:  Past medical and surgical history, family history and social history reviewed and updated when appropriate.  Current medications and allergies reviewed and updated when appropriate.  Allergies:  Ciprofloxacin; Tekturna [aliskiren]; Codeine; and Sulfa antibiotics        Vital Signs:   Temp:  [97 °F (36.1 °C)] 97 °F (36.1 °C)  Heart Rate:  [71] 71  BP: (119)/(64) 119/64    EXAMINATION:   CONSTITIONAL: well nourished, well-developed, alert, oriented, in no acute distress     BODY HABITUS: Normal body habitus     COMMUNICATION: able to communicate normally, normal voice quality    HEAD:   Craniotomy-  R fronto-parietal area,   L facial and temple edema, ecchymosis    FACE: structure normal  Right  Facial trauma:   forehead:        LEFT: edema and ecchymosis  cheek-         LEFT: edema, ecchymosis, depressed medially, malar medially depressed  temple-         LEFT: ecchymosis, tenderness  tenderness present-  moderate    deformity present- L upper face    SALIVARY GLANDS: parotid glands with no tenderness, no swelling, no masses, submandibular glands with normal size, nontender     EYE: OD    Nomral, PRRL, EOMI, sclera non-icteric  OS    Pupils- PERRL    Conjunctiva- subconjuctival hemorrhage-  lateral    Sclera-  non-icteric      Cornea- clear, non-ulcerative    EOM-  intact    Eyelids- left-  ecchymosis and edema, palpebral fissure open but smaller than OD     Proptosis:   Left- mild    HEARING:   response to conversational voice normal with hearing aids    Hearing Aids present:  bilateral    EARS: Otoscopic exam   Bilateral  normal pinna with no lesions, Canals normal size and shape, Tympanic membranes normal, Ossicular chain intact, Middle ear clear     NOSE EXTERNAL: APPEARANCE: L nasal projection maxilla with ecchymosis  SKIN: normal thickness, intact without lesions or scarring  NASAL BONES:  Left- lateral palpable fracture, depressed  NARIS(ES): patent, symmetric, intact alar rim, good support   Bilateral  TIP: normal, symmetric, good projection of tip, LLC and domes intact  Lower lateral cartilage(s): normal, inact, symmetric,  good support  NASAL VALVE: intact, good support and no nasal obstruction   Bilateral  ALAR RIM(S): intact, normal structure and shape, no lesions  Bilateral  COLUMELLA: straight, intact with good support, no lesions, medial crura good position  SEPTUM CAUDAL: Straight with good support, not obstructing the nasal airway  FACIAL TENDERNESS:   Left- medial face    NOSE INTERNAL: Anterior rhinoscopy performed  NASALMUCOSA:    intact mucosa, no lesions  Right    abnormal: L side with mild ecchymosis present  NASAL PASSAGES:     normal, patent   NASAL VALVE:     intact, good support and no nasal obstruction  SEPTUM:     mucosa abnormal   Left- ecchymosis    Little's area:  Left- ecchymosis    midline  INFERIOR TURBINATES:     normal size, non-obstructing, no lesions  Right    abnormal  Left- ecchymosis, normal size  SECRETIONS:     normal amount, clear    ORAL CAVITY: Normal lips with no lesions, dentition normal for age, FOM intact without lesions and normal salivary flow, Mucosa intact without lesions, Hard and soft palate normal without lesions    OROPHARYNX: Direct examination  oropharyngeal mucosa normal, tonsil with normal appearance    NECK: normal appearance, no masses, no lesions, larynx normal mobility, trachea midline   Neck position:  lordotic    LYMPH NODES : no adenopathy    THYROID: no overt thyromegaly, no tenderness, nodules or mass present on palpation, position midline    CHEST/RESPIRATORY: respiratory effort normal, no rales, rubs or wheezing, no stridor, normal appearance to chest    CARDIOVASCULAR: regular rate and rhythm, no murmurs, gallups, no peripheral edema    NEUROPSYCHIATRIC: oriented appropriately for age, mood normal, affect appropriate, cranial nerves intact grossly (unless specifically described), gait normal for age    RESULTS REVIEW:    I have personally reviewed the patient's ct scan of the facial bones images.    Assessment      Problem List Items Addressed This Visit        Musculoskeletal and Integument    Closed extensive facial fractures (CMS/HCC)    Relevant Orders    Case Request (Completed)    Comprehensive Metabolic Panel    CBC and Differential    Facial fracture due to fall, closed, initial encounter (CMS/[facility]) - Primary    Contusion of face, scalp, and neck, initial encounter    Relevant Orders    Case Request (Completed)    Comprehensive Metabolic Panel    CBC and Differential      Other Visit Diagnoses     Subconjunctival hematoma, left              Plan      Medical and surgical options were discussed including medical and surgical options. Risks, benefits and alternatives were discussed and questions were answered. After considering the options, the patient decided to proceed with surgery.     -----SURGERY SCHEDULING:-----  Schedule: OPEN REDUCTION INTERNAL FIXATION Left orbital, malar and maxillary fracture with intra-ral incision, Eyelid incisions     ---INFORMED CONSENT DISCUSSION:---  OPEN REDUCTION INTERNAL FIXATION OF FACIAL FRACTURES AND REPAIR SOFT TISSUE INJURY: The risks and benefits were explained including but not limited to pain, bleeding, infection, the risks of the general anesthesia, damage to the eye, soft tissue, nerves of face and bones, poor healing, scarring of face, infection of  plates/implans, requirement for additional surgery, possible flap or skin graft closure.    Questions were answered. No guarantees were made or implied.       ---PREOPERATIVE WORKUP:---  labs/ workup per anesthesia  CBC  CMP  Chest X-Ray  EKG  PT/INR  PTT  Preoperative Medicine evaluation for clearance  Dr Carrillo     Return 10-12 days after surgery, for Recheck Face and surgical sites.    Alex Rm Jr, MD  8/6/2019  12:25 PM  Alex Rm Jr, MD  08/06/19  12:25 PM

## 2019-08-06 NOTE — PATIENT INSTRUCTIONS
APPLICATION OF HEAT AND ICE    At times, judicious application of heat or ice can accelerate healing, diminish swelling and reduce pain. Dr. Rm will frequently prescribe heat, ice or both as part of the treatment of your injury or surgery.     How to apply ice:  Never apply ice directly to the skin. Always place an insulating layer, such as a washcloth or ice bag, between the ice and the skin.  Ice bags can be made of zip lock bags, water and ice, wrapped in a washcloth. Do not fill the bag too full and this will conform well around curves of the body, head and neck.  If your injury has just occurred, remember rest, ice, compression, and elevation (RICE). In an acute injury, ice is best applied for 48 to 72 hours to minimize swelling and pain. Doing this as often as possible (at least 4 times daily, but constantly if possible).     How to apply heat:  Never apply a heating pad while sleeping. This may lead to a burn. Heating pads apply continuous heat that can build up while sleeping.  Hot water bottles are excellent for applying heat.  Make a hot compress by heating a washcloth in the microwave, placing it in a zip lock bag and apply to the affected area.  You can use either dry heat or moist heat, whichever feels the best. Using moist heat will loosen scabbing and crusting, making the scabs easier to remove. This will also unstick eyelids that are stuck together.    alternate heat and ice application for 15 minutes each, several times daily.    Alex Rm Jr, MD has explained the risks, benefits and alternatives to the patient/patient’s representative, in clear and simple language.  Time was allowed for questions.  Risks of procedure include but are not limited to:    As a result of this procedure being performed, the material risks generally recognized are INFECTION, ALLERGIC REACTION, SEVERE LOSS OF BLOOD, LOSS OR LOSS OF FUNCTION OF ANY LIMB OR ORGAN, PARALYSIS OR PARTIAL PARALYSIS, PARAPLEGIA OR  QUADRIPLEGIA, DISFIGURING SCAR, BRAIN DAMAGE, CARDIAC ARREST OR DEATH, BLOOD LOSS NECESSITATING TRANSFUSION WHICH CARRIES THE RISK OF EXPOSURE TO AIDS, HEPATITIS OR OTHER INFECTIOUS DISEASES.      Procedure: Open reduction, internal fixation Facial fractures, Repair of soft tissue trauma of Face/Neck/Scalp    Risks specific for procedure:    The risks and benefits were explained including but not limited to pain, bleeding, infection, the risks of the general anesthesia, damage to the eye, soft tissue, nerves of face and bones, poor healing, scarring of face, infection of plates/implans, requirement for additional surgery, possible flap or skin graft closure.    No guarantees of outcome given or implied  Patient and Daughter demonstrate understanding    Patient and Daughter do wish to proceed with proposed procedure    Soft diet  Do NOT blow nose

## 2019-08-06 NOTE — TELEPHONE ENCOUNTER
I have contacted Dr Ancelom Carrillo in regards to our mutual patient that was seen in our office today by Dr Rm who is doing surgery on her on Thursday 08/08/2019 for Facial Fracture repair. Patient is clear for surgery.

## 2019-08-07 ENCOUNTER — APPOINTMENT (OUTPATIENT)
Dept: PREADMISSION TESTING | Facility: HOSPITAL | Age: 84
End: 2019-08-07

## 2019-08-07 VITALS
HEART RATE: 70 BPM | DIASTOLIC BLOOD PRESSURE: 53 MMHG | SYSTOLIC BLOOD PRESSURE: 119 MMHG | WEIGHT: 172.4 LBS | OXYGEN SATURATION: 98 % | BODY MASS INDEX: 26.13 KG/M2 | RESPIRATION RATE: 18 BRPM | HEIGHT: 68 IN

## 2019-08-08 ENCOUNTER — ANESTHESIA (OUTPATIENT)
Dept: PERIOP | Facility: HOSPITAL | Age: 84
End: 2019-08-08

## 2019-08-08 ENCOUNTER — ANESTHESIA EVENT (OUTPATIENT)
Dept: PERIOP | Facility: HOSPITAL | Age: 84
End: 2019-08-08

## 2019-08-08 ENCOUNTER — HOSPITAL ENCOUNTER (OUTPATIENT)
Facility: HOSPITAL | Age: 84
Discharge: HOME | End: 2019-08-09
Attending: OTOLARYNGOLOGY | Admitting: OTOLARYNGOLOGY

## 2019-08-08 PROCEDURE — 21365 OPN TX COMPLX MALAR FX: CPT | Performed by: OTOLARYNGOLOGY

## 2019-08-08 PROCEDURE — A9270 NON-COVERED ITEM OR SERVICE: HCPCS | Performed by: ANESTHESIOLOGY

## 2019-08-08 PROCEDURE — 63710000001 LOSARTAN 50 MG TABLET: Performed by: OTOLARYNGOLOGY

## 2019-08-08 PROCEDURE — C1713 ANCHOR/SCREW BN/BN,TIS/BN: HCPCS | Performed by: OTOLARYNGOLOGY

## 2019-08-08 PROCEDURE — 63710000001 ALLOPURINOL 100 MG TABLET: Performed by: OTOLARYNGOLOGY

## 2019-08-08 PROCEDURE — 63710000001 ACETAMINOPHEN 500 MG TABLET: Performed by: ANESTHESIOLOGY

## 2019-08-08 PROCEDURE — 63710000001 ERYTHROMYCIN 5 MG/GM OINTMENT 1 G TUBE: Performed by: OTOLARYNGOLOGY

## 2019-08-08 PROCEDURE — A9270 NON-COVERED ITEM OR SERVICE: HCPCS | Performed by: OTOLARYNGOLOGY

## 2019-08-08 PROCEDURE — 63710000001 HYDROCHLOROTHIAZIDE 25 MG TABLET: Performed by: OTOLARYNGOLOGY

## 2019-08-08 PROCEDURE — 63710000001: Performed by: OTOLARYNGOLOGY

## 2019-08-08 PROCEDURE — 25010000002 FENTANYL CITRATE (PF) 250 MCG/5ML SOLUTION: Performed by: NURSE ANESTHETIST, CERTIFIED REGISTERED

## 2019-08-08 PROCEDURE — 63710000001 HYDROCODONE-ACETAMINOPHEN 5-325 MG TABLET: Performed by: OTOLARYNGOLOGY

## 2019-08-08 PROCEDURE — 25010000003 CEFAZOLIN PER 500 MG: Performed by: NURSE ANESTHETIST, CERTIFIED REGISTERED

## 2019-08-08 PROCEDURE — 63710000001 AMLODIPINE 5 MG TABLET: Performed by: OTOLARYNGOLOGY

## 2019-08-08 PROCEDURE — 25010000002 PROPOFOL 10 MG/ML EMULSION: Performed by: NURSE ANESTHETIST, CERTIFIED REGISTERED

## 2019-08-08 DEVICE — IMPLANTABLE DEVICE
Type: IMPLANTABLE DEVICE | Status: FUNCTIONAL
Brand: TRAUMAONE SYSTEM

## 2019-08-08 DEVICE — IMPLANTABLE DEVICE
Type: IMPLANTABLE DEVICE | Status: FUNCTIONAL
Brand: "1.5MM" SYSTEM

## 2019-08-08 RX ORDER — CLONIDINE HYDROCHLORIDE 0.1 MG/1
0.1 TABLET ORAL ONCE
Status: DISCONTINUED | OUTPATIENT
Start: 2019-08-08 | End: 2019-08-09 | Stop reason: HOSPADM

## 2019-08-08 RX ORDER — SODIUM CHLORIDE 0.9 % (FLUSH) 0.9 %
3 SYRINGE (ML) INJECTION EVERY 12 HOURS SCHEDULED
Status: DISCONTINUED | OUTPATIENT
Start: 2019-08-08 | End: 2019-08-09 | Stop reason: HOSPADM

## 2019-08-08 RX ORDER — AMLODIPINE BESYLATE 5 MG/1
5 TABLET ORAL DAILY
Status: DISCONTINUED | OUTPATIENT
Start: 2019-08-08 | End: 2019-08-09 | Stop reason: HOSPADM

## 2019-08-08 RX ORDER — SODIUM CHLORIDE 0.9 % (FLUSH) 0.9 %
3 SYRINGE (ML) INJECTION EVERY 12 HOURS SCHEDULED
Status: DISCONTINUED | OUTPATIENT
Start: 2019-08-08 | End: 2019-08-08 | Stop reason: HOSPADM

## 2019-08-08 RX ORDER — ECHINACEA PURPUREA EXTRACT 125 MG
2 TABLET ORAL
Status: DISCONTINUED | OUTPATIENT
Start: 2019-08-08 | End: 2019-08-09 | Stop reason: HOSPADM

## 2019-08-08 RX ORDER — SODIUM CHLORIDE, SODIUM LACTATE, POTASSIUM CHLORIDE, CALCIUM CHLORIDE 600; 310; 30; 20 MG/100ML; MG/100ML; MG/100ML; MG/100ML
1000 INJECTION, SOLUTION INTRAVENOUS CONTINUOUS
Status: DISCONTINUED | OUTPATIENT
Start: 2019-08-08 | End: 2019-08-09 | Stop reason: HOSPADM

## 2019-08-08 RX ORDER — OXYMETAZOLINE HYDROCHLORIDE 0.05 G/100ML
2 SPRAY NASAL AS NEEDED
Status: DISCONTINUED | OUTPATIENT
Start: 2019-08-08 | End: 2019-08-09 | Stop reason: HOSPADM

## 2019-08-08 RX ORDER — MAGNESIUM HYDROXIDE 1200 MG/15ML
LIQUID ORAL AS NEEDED
Status: DISCONTINUED | OUTPATIENT
Start: 2019-08-08 | End: 2019-08-08 | Stop reason: HOSPADM

## 2019-08-08 RX ORDER — ERYTHROMYCIN 5 MG/G
OINTMENT OPHTHALMIC EVERY 8 HOURS SCHEDULED
Status: DISCONTINUED | OUTPATIENT
Start: 2019-08-08 | End: 2019-08-09 | Stop reason: HOSPADM

## 2019-08-08 RX ORDER — PROPOFOL 10 MG/ML
VIAL (ML) INTRAVENOUS AS NEEDED
Status: DISCONTINUED | OUTPATIENT
Start: 2019-08-08 | End: 2019-08-08 | Stop reason: SURG

## 2019-08-08 RX ORDER — SODIUM CHLORIDE 0.9 % (FLUSH) 0.9 %
3-10 SYRINGE (ML) INJECTION AS NEEDED
Status: DISCONTINUED | OUTPATIENT
Start: 2019-08-08 | End: 2019-08-09 | Stop reason: HOSPADM

## 2019-08-08 RX ORDER — NALOXONE HCL 0.4 MG/ML
0.4 VIAL (ML) INJECTION
Status: DISCONTINUED | OUTPATIENT
Start: 2019-08-08 | End: 2019-08-09 | Stop reason: HOSPADM

## 2019-08-08 RX ORDER — LIDOCAINE HYDROCHLORIDE 20 MG/ML
INJECTION, SOLUTION INFILTRATION; PERINEURAL AS NEEDED
Status: DISCONTINUED | OUTPATIENT
Start: 2019-08-08 | End: 2019-08-08 | Stop reason: SURG

## 2019-08-08 RX ORDER — ACETAMINOPHEN 325 MG/1
650 TABLET ORAL EVERY 4 HOURS PRN
Status: DISCONTINUED | OUTPATIENT
Start: 2019-08-08 | End: 2019-08-09 | Stop reason: HOSPADM

## 2019-08-08 RX ORDER — ONDANSETRON 2 MG/ML
4 INJECTION INTRAMUSCULAR; INTRAVENOUS ONCE AS NEEDED
Status: DISCONTINUED | OUTPATIENT
Start: 2019-08-08 | End: 2019-08-08 | Stop reason: HOSPADM

## 2019-08-08 RX ORDER — IBUPROFEN 200 MG
200 TABLET ORAL EVERY 4 HOURS PRN
Status: DISCONTINUED | OUTPATIENT
Start: 2019-08-08 | End: 2019-08-09 | Stop reason: HOSPADM

## 2019-08-08 RX ORDER — OXYCODONE AND ACETAMINOPHEN 7.5; 325 MG/1; MG/1
2 TABLET ORAL EVERY 4 HOURS PRN
Status: DISCONTINUED | OUTPATIENT
Start: 2019-08-08 | End: 2019-08-08 | Stop reason: HOSPADM

## 2019-08-08 RX ORDER — OXYCODONE AND ACETAMINOPHEN 10; 325 MG/1; MG/1
1 TABLET ORAL ONCE AS NEEDED
Status: DISCONTINUED | OUTPATIENT
Start: 2019-08-08 | End: 2019-08-08 | Stop reason: HOSPADM

## 2019-08-08 RX ORDER — LIDOCAINE HYDROCHLORIDE AND EPINEPHRINE 10; 10 MG/ML; UG/ML
INJECTION, SOLUTION INFILTRATION; PERINEURAL AS NEEDED
Status: DISCONTINUED | OUTPATIENT
Start: 2019-08-08 | End: 2019-08-08 | Stop reason: HOSPADM

## 2019-08-08 RX ORDER — CEFAZOLIN SODIUM 1 G/3ML
INJECTION, POWDER, FOR SOLUTION INTRAMUSCULAR; INTRAVENOUS AS NEEDED
Status: DISCONTINUED | OUTPATIENT
Start: 2019-08-08 | End: 2019-08-08 | Stop reason: SURG

## 2019-08-08 RX ORDER — DEXTROSE AND SODIUM CHLORIDE 5; .45 G/100ML; G/100ML
100 INJECTION, SOLUTION INTRAVENOUS CONTINUOUS
Status: DISCONTINUED | OUTPATIENT
Start: 2019-08-08 | End: 2019-08-09 | Stop reason: HOSPADM

## 2019-08-08 RX ORDER — ONDANSETRON 2 MG/ML
4 INJECTION INTRAMUSCULAR; INTRAVENOUS ONCE AS NEEDED
Status: DISCONTINUED | OUTPATIENT
Start: 2019-08-08 | End: 2019-08-09 | Stop reason: HOSPADM

## 2019-08-08 RX ORDER — PROMETHAZINE HYDROCHLORIDE 25 MG/1
12.5 TABLET ORAL EVERY 4 HOURS PRN
Status: DISCONTINUED | OUTPATIENT
Start: 2019-08-08 | End: 2019-08-09 | Stop reason: HOSPADM

## 2019-08-08 RX ORDER — ASPIRIN 325 MG
325 TABLET ORAL DAILY
COMMUNITY

## 2019-08-08 RX ORDER — FENTANYL CITRATE 50 UG/ML
25 INJECTION, SOLUTION INTRAMUSCULAR; INTRAVENOUS AS NEEDED
Status: DISCONTINUED | OUTPATIENT
Start: 2019-08-08 | End: 2019-08-08 | Stop reason: HOSPADM

## 2019-08-08 RX ORDER — IBUPROFEN 600 MG/1
600 TABLET ORAL ONCE AS NEEDED
Status: DISCONTINUED | OUTPATIENT
Start: 2019-08-08 | End: 2019-08-08 | Stop reason: HOSPADM

## 2019-08-08 RX ORDER — SODIUM CHLORIDE 0.9 % (FLUSH) 0.9 %
1-10 SYRINGE (ML) INJECTION AS NEEDED
Status: DISCONTINUED | OUTPATIENT
Start: 2019-08-08 | End: 2019-08-08 | Stop reason: HOSPADM

## 2019-08-08 RX ORDER — FAMOTIDINE 10 MG/ML
20 INJECTION, SOLUTION INTRAVENOUS
Status: COMPLETED | OUTPATIENT
Start: 2019-08-08 | End: 2019-08-08

## 2019-08-08 RX ORDER — FENTANYL CITRATE 50 UG/ML
INJECTION, SOLUTION INTRAMUSCULAR; INTRAVENOUS AS NEEDED
Status: DISCONTINUED | OUTPATIENT
Start: 2019-08-08 | End: 2019-08-08 | Stop reason: SURG

## 2019-08-08 RX ORDER — NEBIVOLOL 5 MG/1
5 TABLET ORAL 2 TIMES DAILY
Status: DISCONTINUED | OUTPATIENT
Start: 2019-08-08 | End: 2019-08-09 | Stop reason: HOSPADM

## 2019-08-08 RX ORDER — SODIUM CHLORIDE, SODIUM LACTATE, POTASSIUM CHLORIDE, CALCIUM CHLORIDE 600; 310; 30; 20 MG/100ML; MG/100ML; MG/100ML; MG/100ML
100 INJECTION, SOLUTION INTRAVENOUS CONTINUOUS
Status: DISCONTINUED | OUTPATIENT
Start: 2019-08-08 | End: 2019-08-09 | Stop reason: HOSPADM

## 2019-08-08 RX ORDER — LIDOCAINE HYDROCHLORIDE 40 MG/ML
SOLUTION TOPICAL AS NEEDED
Status: DISCONTINUED | OUTPATIENT
Start: 2019-08-08 | End: 2019-08-08 | Stop reason: SURG

## 2019-08-08 RX ORDER — HYDROCODONE BITARTRATE AND ACETAMINOPHEN 5; 325 MG/1; MG/1
1 TABLET ORAL EVERY 4 HOURS PRN
Status: DISCONTINUED | OUTPATIENT
Start: 2019-08-08 | End: 2019-08-09 | Stop reason: HOSPADM

## 2019-08-08 RX ORDER — HYDROCODONE BITARTRATE AND ACETAMINOPHEN 5; 325 MG/1; MG/1
1 TABLET ORAL EVERY 6 HOURS PRN
Status: DISCONTINUED | OUTPATIENT
Start: 2019-08-08 | End: 2019-08-09 | Stop reason: HOSPADM

## 2019-08-08 RX ORDER — ALLOPURINOL 100 MG/1
100 TABLET ORAL DAILY
Status: DISCONTINUED | OUTPATIENT
Start: 2019-08-08 | End: 2019-08-09 | Stop reason: HOSPADM

## 2019-08-08 RX ORDER — ROCURONIUM BROMIDE 10 MG/ML
INJECTION, SOLUTION INTRAVENOUS AS NEEDED
Status: DISCONTINUED | OUTPATIENT
Start: 2019-08-08 | End: 2019-08-08 | Stop reason: SURG

## 2019-08-08 RX ORDER — OXYMETAZOLINE HYDROCHLORIDE 0.05 G/100ML
SPRAY NASAL AS NEEDED
Status: DISCONTINUED | OUTPATIENT
Start: 2019-08-08 | End: 2019-08-08 | Stop reason: HOSPADM

## 2019-08-08 RX ORDER — METOCLOPRAMIDE HYDROCHLORIDE 5 MG/ML
5 INJECTION INTRAMUSCULAR; INTRAVENOUS
Status: DISCONTINUED | OUTPATIENT
Start: 2019-08-08 | End: 2019-08-08 | Stop reason: HOSPADM

## 2019-08-08 RX ORDER — SODIUM CHLORIDE 0.9 % (FLUSH) 0.9 %
3 SYRINGE (ML) INJECTION AS NEEDED
Status: DISCONTINUED | OUTPATIENT
Start: 2019-08-08 | End: 2019-08-08 | Stop reason: HOSPADM

## 2019-08-08 RX ORDER — ASPIRIN 325 MG
325 TABLET ORAL DAILY
Status: DISCONTINUED | OUTPATIENT
Start: 2019-08-08 | End: 2019-08-09 | Stop reason: HOSPADM

## 2019-08-08 RX ORDER — ACETAMINOPHEN 500 MG
1000 TABLET ORAL ONCE
Status: COMPLETED | OUTPATIENT
Start: 2019-08-08 | End: 2019-08-08

## 2019-08-08 RX ORDER — NALOXONE HCL 0.4 MG/ML
0.4 VIAL (ML) INJECTION AS NEEDED
Status: DISCONTINUED | OUTPATIENT
Start: 2019-08-08 | End: 2019-08-08 | Stop reason: HOSPADM

## 2019-08-08 RX ORDER — HYDROCHLOROTHIAZIDE 25 MG/1
25 TABLET ORAL DAILY
Status: DISCONTINUED | OUTPATIENT
Start: 2019-08-08 | End: 2019-08-09 | Stop reason: HOSPADM

## 2019-08-08 RX ORDER — LOSARTAN POTASSIUM 50 MG/1
50 TABLET ORAL
Status: DISCONTINUED | OUTPATIENT
Start: 2019-08-08 | End: 2019-08-09 | Stop reason: HOSPADM

## 2019-08-08 RX ORDER — IPRATROPIUM BROMIDE AND ALBUTEROL SULFATE 2.5; .5 MG/3ML; MG/3ML
3 SOLUTION RESPIRATORY (INHALATION) ONCE AS NEEDED
Status: DISCONTINUED | OUTPATIENT
Start: 2019-08-08 | End: 2019-08-08 | Stop reason: HOSPADM

## 2019-08-08 RX ORDER — LABETALOL HYDROCHLORIDE 5 MG/ML
5 INJECTION, SOLUTION INTRAVENOUS
Status: DISCONTINUED | OUTPATIENT
Start: 2019-08-08 | End: 2019-08-08 | Stop reason: HOSPADM

## 2019-08-08 RX ADMIN — ROCURONIUM BROMIDE 20 MG: 10 INJECTION INTRAVENOUS at 16:07

## 2019-08-08 RX ADMIN — DEXTROSE AND SODIUM CHLORIDE 100 ML/HR: 5; 450 INJECTION, SOLUTION INTRAVENOUS at 21:16

## 2019-08-08 RX ADMIN — HYDROCHLOROTHIAZIDE 25 MG: 25 TABLET ORAL at 21:17

## 2019-08-08 RX ADMIN — ACETAMINOPHEN 1000 MG: 500 TABLET, FILM COATED ORAL at 15:56

## 2019-08-08 RX ADMIN — PROPOFOL 200 MG: 10 INJECTION, EMULSION INTRAVENOUS at 16:07

## 2019-08-08 RX ADMIN — SODIUM CHLORIDE, PRESERVATIVE FREE 3 ML: 5 INJECTION INTRAVENOUS at 21:16

## 2019-08-08 RX ADMIN — AMLODIPINE BESYLATE 5 MG: 5 TABLET ORAL at 21:17

## 2019-08-08 RX ADMIN — FENTANYL CITRATE 250 MCG: 50 INJECTION INTRAMUSCULAR; INTRAVENOUS at 16:07

## 2019-08-08 RX ADMIN — HYDROCODONE BITARTRATE AND ACETAMINOPHEN 1 TABLET: 5; 325 TABLET ORAL at 21:42

## 2019-08-08 RX ADMIN — ALLOPURINOL 100 MG: 100 TABLET ORAL at 21:17

## 2019-08-08 RX ADMIN — CEFAZOLIN 2 G: 1 INJECTION, POWDER, FOR SOLUTION INTRAVENOUS at 16:15

## 2019-08-08 RX ADMIN — LIDOCAINE HYDROCHLORIDE 50 MG: 20 INJECTION, SOLUTION INFILTRATION; PERINEURAL at 16:07

## 2019-08-08 RX ADMIN — LABETALOL 20 MG/4 ML (5 MG/ML) INTRAVENOUS SYRINGE 5 MG: at 18:02

## 2019-08-08 RX ADMIN — SODIUM CHLORIDE, POTASSIUM CHLORIDE, SODIUM LACTATE AND CALCIUM CHLORIDE 1000 ML: 600; 310; 30; 20 INJECTION, SOLUTION INTRAVENOUS at 09:41

## 2019-08-08 RX ADMIN — LABETALOL 20 MG/4 ML (5 MG/ML) INTRAVENOUS SYRINGE 5 MG: at 18:12

## 2019-08-08 RX ADMIN — FAMOTIDINE 20 MG: 10 INJECTION INTRAVENOUS at 15:56

## 2019-08-08 RX ADMIN — Medication 2 SPRAY: at 21:18

## 2019-08-08 RX ADMIN — LABETALOL 20 MG/4 ML (5 MG/ML) INTRAVENOUS SYRINGE 5 MG: at 18:26

## 2019-08-08 RX ADMIN — ERYTHROMYCIN: 5 OINTMENT OPHTHALMIC at 21:18

## 2019-08-08 RX ADMIN — LIDOCAINE HYDROCHLORIDE 1 EACH: 40 SOLUTION TOPICAL at 16:07

## 2019-08-08 RX ADMIN — LOSARTAN POTASSIUM 50 MG: 50 TABLET, FILM COATED ORAL at 21:17

## 2019-08-08 RX ADMIN — LABETALOL 20 MG/4 ML (5 MG/ML) INTRAVENOUS SYRINGE 5 MG: at 18:43

## 2019-08-08 NOTE — ANESTHESIA PROCEDURE NOTES
Airway  Urgency: elective    Airway not difficult    General Information and Staff    Patient location during procedure: OR  CRNA: Nixon Elizondo CRNA    Indications and Patient Condition  Indications for airway management: airway protection    Preoxygenated: yes  MILS maintained throughout  Mask difficulty assessment: 1 - vent by mask    Final Airway Details  Final airway type: endotracheal airway      Successful airway: ETT  Cuffed: yes   Successful intubation technique: direct laryngoscopy  Facilitating devices/methods: intubating stylet  Endotracheal tube insertion site: oral  Blade: Sarah  Blade size: 3  ETT size (mm): 7.0  Cormack-Lehane Classification: grade I - full view of glottis  Placement verified by: chest auscultation and capnometry   Cuff volume (mL): 8  Measured from: lips  ETT to lips (cm): 21  Number of attempts at approach: 1

## 2019-08-08 NOTE — ANESTHESIA PREPROCEDURE EVALUATION
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 CT head wnl. )  intravenous induction   Anesthetic plan, all risks, benefits, and alternatives have been provided, discussed and informed consent has been obtained with: patient.

## 2019-08-08 NOTE — OP NOTE
Alex Rm Jr, MD     OPERATIVE NOTE     Angelia Munoz  8/8/2019    Pre-op Diagnosis:   Closed fracture of face bones due to fall with routine healing, subsequent encounter [S02.92XD, W19.XXXD]  Contusion of face, scalp, and neck, initial encounter [S00.83XA, S00.03XA, S10.93XA]  Closed extensive facial fractures with delayed healing, subsequent encounter [S02.92XG]    Post-op Diagnosis:     Post-Op Diagnosis Codes:     * Closed fracture of face bones due to fall with routine healing, subsequent encounter [S02.92XD, W19.XXXD]     * Contusion of face, scalp, and neck, initial encounter [S00.83XA, S00.03XA, S10.93XA]     * Closed extensive facial fractures with delayed healing, subsequent encounter [S02.92XG]    Procedure/CPT® Codes:  Procedure(s):  FACIAL FRACTURE OPEN REDUCTION INTERNAL FIXATION    Surgeon(s):  Alex Rm Jr., MD    Anesthesia:   [id number]    Staff:   Circulator: Cheli Britton RN  Scrub Person: Prachi Brown  Assistant: Donna Estrada    Estimated Blood Loss:   50 mL    Specimens:   none      Drains:   none    Findings:  Fracture: :upper face  Comminuted Medial and lateral Maxillary walls L with intact medial buttress and fractured posterior buttress  Fracture of medial infraorbital rim with comminuted segment in the extension of fracture into the orbital floor  Fracture of the zygomaticofrontal suture  Rotation of the entire malar and anterior maxillary area inferiorly and medially  Comminuted fragments found within the maxillary antrum  Fracture just above the alveolus at the level of the tooth roots, comminuted    Complications:   none    Assistants:  none    Implants:  Biomet plating system    Time Out::  A time out was performed to confirm the patient, procedure and laterality.    Reason for the Operation: Angelia Munoz is a 86 y.o. female who has had a history of Lcominuted facial fracture.  Preoperative discussion was carried out. Risks, benefits, options for therapy and  complications were explained in clear and simple language.      Procedure Description:  The patient was taken back to the operating room, positioned on the operating table and placed under satisfactory anesthesia by the anesthesia staff.      Injections-lidocaine 1%, epinephrine 1:100k 10 cc in the left superior sulcus the oral cavity and in the left zygomaticofrontal suture area along the orbital rim and along the tarsal plate of the inferior eyelid    Approach-superior sulcus of the oral cavity over the left maxilla, left zygomatic frontal suture area    Procedure detail:  An intraoral approach is made to the left maxilla.  After making the incision in the superior sulcus of the oral cavity from midline to the second molar, the subperiosteal dissection was carried up to the infraorbital rim identifying the fractures as above.  Small fragments were left attached to soft tissue and elevated with the subperiosteal elevation to provide some vascularization of the segments.  The fracture sites were then identified as described above.    An incision was made over the zygomaticofrontal area to identify the suture fracture.  Subperiosteal dissection was carried out to identify the fracture site.  After identification of these 2 fracture sites, a Clinton was placed within the maxillary antrum under the malar eminence and the bone was gently reduced to bring the fracture sites into alignment.  This aligned the infraorbital rim as visualized from the oral incision.  Because the extensive comminuted fracture, the alveolus was not stable enough to plate to.  An L-shaped plate was placed along the medial buttress area near the piriform aperture.  This was then spanned across the maxilla anteriorly below the infraorbital nerve, out to the thicker bone of the malar eminence.  Using a combination of 4 mm and 5 mm screws the plate was secured.  The infraorbital rim alignment as well as the zygomaticofrontal suture alignment were  carefully checked at this point in time and felt to be adequate.  The anterior maxillary wall was severely comminuted.  However, there was a large enough fragment to place a 4 hole plate from just below the malar eminence over the larger fragment and secure this in position this along the lateral portion of the anterior wall of the maxillary sinus.  The left posterior buttress was reduced by allowing the soft tissue with bone fragments to fall back into position along the buttress alignment.  The maxillary sinus was then irrigated clean and dry.  There was a small free-floating segment of bone along the medial infraorbital rim that was reduced and held into position with maxillary sinus packing with Gelfoam and thrombin.  This appeared to reduce the infraorbital rim fracture and re-create normal alignment.  Projection was then assessed with the opposite side of the face and found to be satisfactory.  The infero-orbital rim and the zygomaticofrontal suture line were then reassessed for proper reduction and this was felt to be satisfactory.  The anterior and lateral maxillary walls were reduced, and the comminuted fragments replaced in approximate position    The wound was then irrigated with saline.    Closure:  Intraoral incision-the intraoral incision was closed with a running locking suture of 3-0 chromic gut.  Zygomaticofrontal suture-incision in the zygomaticofrontal suture area was closed with deep tissue of 5-0 Monocryl and the skin was closed with a running locking vertical mattress suture of 5-0 Monocryl area    The operative site was inspected for retained foreign bodies and instruments.   Sponge/needle count was Correct  Hemostasis was satisfactory.  The patient was then turned over to the anesthesia team and allowed to wake from anesthesia.     Disposition: The patient was transported to the PACU in Good condition.       Postoperative discussion held with: family  Procedure and findings reviewed.  DVT  ASSESSMENT CARRIED OUT: Patient is in the immediate post-operative period and is not a candidate for anticoagulation therapy    Alex Rm Jr, MD      Date: 8/8/2019  Time: 5:53 PM

## 2019-08-09 VITALS
WEIGHT: 165.2 LBS | SYSTOLIC BLOOD PRESSURE: 133 MMHG | HEART RATE: 80 BPM | OXYGEN SATURATION: 97 % | DIASTOLIC BLOOD PRESSURE: 65 MMHG | HEIGHT: 68 IN | TEMPERATURE: 98.8 F | RESPIRATION RATE: 16 BRPM | BODY MASS INDEX: 25.04 KG/M2

## 2019-08-09 PROCEDURE — A9270 NON-COVERED ITEM OR SERVICE: HCPCS | Performed by: OTOLARYNGOLOGY

## 2019-08-09 PROCEDURE — 63710000001 HYDROCHLOROTHIAZIDE 25 MG TABLET: Performed by: OTOLARYNGOLOGY

## 2019-08-09 PROCEDURE — 63710000001 ASPIRIN 325 MG TABLET: Performed by: OTOLARYNGOLOGY

## 2019-08-09 PROCEDURE — 99024 POSTOP FOLLOW-UP VISIT: CPT | Performed by: OTOLARYNGOLOGY

## 2019-08-09 PROCEDURE — 63710000001 AMLODIPINE 5 MG TABLET: Performed by: OTOLARYNGOLOGY

## 2019-08-09 PROCEDURE — 63710000001 ALLOPURINOL 100 MG TABLET: Performed by: OTOLARYNGOLOGY

## 2019-08-09 PROCEDURE — 63710000001 LOSARTAN 50 MG TABLET: Performed by: OTOLARYNGOLOGY

## 2019-08-09 PROCEDURE — 63710000001 NEBIVOLOL 5 MG TABLET: Performed by: OTOLARYNGOLOGY

## 2019-08-09 RX ORDER — HYDROCODONE BITARTRATE AND ACETAMINOPHEN 5; 325 MG/1; MG/1
1 TABLET ORAL EVERY 4 HOURS PRN
Qty: 20 TABLET | Refills: 0 | Status: SHIPPED | OUTPATIENT
Start: 2019-08-09 | End: 2019-08-18

## 2019-08-09 RX ORDER — CEPHALEXIN 500 MG/1
500 CAPSULE ORAL 4 TIMES DAILY
Qty: 28 CAPSULE | Refills: 0 | Status: SHIPPED | OUTPATIENT
Start: 2019-08-09 | End: 2019-08-16

## 2019-08-09 RX ADMIN — DEXTROSE AND SODIUM CHLORIDE 100 ML/HR: 5; 450 INJECTION, SOLUTION INTRAVENOUS at 06:11

## 2019-08-09 RX ADMIN — LOSARTAN POTASSIUM 50 MG: 50 TABLET, FILM COATED ORAL at 08:46

## 2019-08-09 RX ADMIN — ASPIRIN 325 MG: 325 TABLET ORAL at 08:46

## 2019-08-09 RX ADMIN — HYDROCHLOROTHIAZIDE 25 MG: 25 TABLET ORAL at 08:46

## 2019-08-09 RX ADMIN — ALLOPURINOL 100 MG: 100 TABLET ORAL at 08:46

## 2019-08-09 RX ADMIN — NEBIVOLOL HYDROCHLORIDE 5 MG: 5 TABLET ORAL at 08:46

## 2019-08-09 RX ADMIN — Medication 2 SPRAY: at 06:08

## 2019-08-09 RX ADMIN — AMLODIPINE BESYLATE 5 MG: 5 TABLET ORAL at 08:46

## 2019-08-09 RX ADMIN — ERYTHROMYCIN: 5 OINTMENT OPHTHALMIC at 06:08

## 2019-08-09 NOTE — ANESTHESIA POSTPROCEDURE EVALUATION
Patient: Angelia Munoz    Procedure Summary     Date:  08/08/19 Room / Location:   [facility]  /  [facility]    Anesthesia Start:  1602 Anesthesia Stop:      Procedure:  FACIAL FRACTURE OPEN REDUCTION INTERNAL FIXATION (Left Face) Diagnosis:       Closed fracture of face bones due to fall with routine healing, subsequent encounter      Contusion of face, scalp, and neck, initial encounter      Closed extensive facial fractures with delayed healing, subsequent encounter      (Closed fracture of face bones due to fall with routine healing, subsequent encounter [S02.92XD, W19.XXXD])      (Contusion of face, scalp, and neck, initial encounter [S00.83XA, S00.03XA, S10.93XA])      (Closed extensive facial fractures with delayed healing, subsequent encounter [S02.92XG])    Surgeon:  Alex Rm Jr., MD Provider:  Nixon Elizondo CRNA    Anesthesia Type:  general ASA Status:  3          Anesthesia Type: general  Last vitals  BP   (!) 187/88 (08/08/19 1840)   Temp   98 °F (36.7 °C) (08/08/19 1840)   Pulse   70 (08/08/19 1840)   Resp   16 (08/08/19 1840)     SpO2   96 % (08/08/19 1840)     Post Anesthesia Care and Evaluation    Patient location during evaluation: PACU  Patient participation: complete - patient participated  Level of consciousness: awake and alert  Pain management: adequate  Airway patency: patent  Anesthetic complications: No anesthetic complications    Cardiovascular status: acceptable  Respiratory status: acceptable  Hydration status: acceptable    Comments: Blood pressure (!) 187/88, pulse 70, temperature 98 °F (36.7 °C), temperature source Temporal, resp. rate 16, SpO2 96 %, not currently breastfeeding.    Pt discharged from PACU based on ann score >8

## 2019-08-09 NOTE — PLAN OF CARE
Problem: Patient Care Overview  Goal: Plan of Care Review  Outcome: Ongoing (interventions implemented as appropriate)   08/09/19 0344   Coping/Psychosocial   Plan of Care Reviewed With patient;daughter   Plan of Care Review   Progress no change   OTHER   Outcome Summary Pt at the beginning of this shift from PACU after ORIF of L facial fx per Dr. Rm. Bruising and swelling noted to L face and neck. IVF initiated. Minimal c/o pain; prn meds given x1. Voiding per BRP. Ice pack prn. Erythromycin and nasal spray scheduled. Up with assist x1. BP elevated upon arrival from PACU; scheduled meds given, and BP improved. Resting well between care. Other VSS. Will cont to monitor.      Goal: Discharge Needs Assessment  Outcome: Ongoing (interventions implemented as appropriate)   08/08/19 1955 08/08/19 1956 08/09/19 0344   Discharge Needs Assessment   Readmission Within the Last 30 Days --  --  planned readmission   Concerns to be Addressed --  --  no discharge needs identified   Patient/Family Anticipates Transition to --  home with family --    Patient/Family Anticipated Services at Transition --  none --    Transportation Anticipated --  family or friend will provide --    Disability   Equipment Currently Used at Home none --  --      Goal: Interprofessional Rounds/Family Conf  Outcome: Ongoing (interventions implemented as appropriate)   08/09/19 0344   Interdisciplinary Rounds/Family Conf   Participants patient;nursing;family       Problem: Fall Risk (Adult)  Goal: Identify Related Risk Factors and Signs and Symptoms  Outcome: Ongoing (interventions implemented as appropriate)   08/09/19 0344   Fall Risk (Adult)   Related Risk Factors (Fall Risk) history of falls;gait/mobility problems;environment unfamiliar   Signs and Symptoms (Fall Risk) presence of risk factors     Goal: Absence of Fall  Outcome: Ongoing (interventions implemented as appropriate)   08/09/19 0344   Fall Risk (Adult)   Absence of Fall making  progress toward outcome       Problem: Pain, Acute (Adult)  Goal: Identify Related Risk Factors and Signs and Symptoms  Outcome: Ongoing (interventions implemented as appropriate)   08/09/19 0344   Pain, Acute (Adult)   Related Risk Factors (Acute Pain) trauma;surgery   Signs and Symptoms (Acute Pain) verbalization of pain descriptors     Goal: Acceptable Pain Control/Comfort Level  Outcome: Ongoing (interventions implemented as appropriate)   08/09/19 0344   Pain, Acute (Adult)   Acceptable Pain Control/Comfort Level making progress toward outcome       Problem: Surgery Nonspecified (Adult)  Goal: Signs and Symptoms of Listed Potential Problems Will be Absent, Minimized or Managed (Surgery Nonspecified)  Outcome: Ongoing (interventions implemented as appropriate)   08/09/19 0344   Goal/Outcome Evaluation   Problems Assessed (Surgery) all   Problems Present (Surgery) situational response;pain

## 2019-08-09 NOTE — DISCHARGE SUMMARY
Alex Rm Jr, MD   DISCHARGE SUMMARY:     PATIENT NAME: Angelia Munoz  ACCOUNT NUMBER: [id number]  ADMISSION DATE:  8/8/2019  DISCHARGE DATE:  8/9/2019   ATTENDING PHYSICIAN: Alex Rm Jr*  CONDITION ON DISCHARGE: stable    ADMITTING DIAGNOSIS:   Left mid face fracture, infraorbital rim, zygomaticofrontal suture, anterior and posterior maxillary with significant displacement    PROCEDURES:   Open reduction internal fixation of left maxillary infraorbital and zygomaticofrontal suture    REASON FOR ADMISSION:   Angelia Munoz is a 86 y.o. female who was admitted on 8/8/2019 for repair of left maxillary infraorbital and zygomaticofrontal suture fracture.    EXAMINATION:  Constitutional: Well-developed well-nourished white female with obvious left upper face trauma  Face: Left mid and upper face ecchymosis, edema, palpable facial fractures along the infraorbital rim and maxillary buttresses  Eyes:  Right-normal and intact  Left-sub-conjunctival hemorrhage present to the limbus 360 degrees, eyelid edema and ecchymosis that is resolving  Nose: Patent bilaterally with mild blood staining in the left nasal cavity  Oral cavity: Intact dentition, superior left oral sulcus with closed incision  Neck: Intact with no lacerations incisions or lesions, normal mobility for age  Chest/respiratory: Bilateral clear breath sounds equal and symmetric  Cardiovascular: Regular rate and rhythm without murmurs or gallops  Neurologic: Awake and alert oriented x3, no abnormalities noted    Surgical Site: Left upper face, intraoral incision in the left upper oral sulcus, left zygomaticofrontal suture line approach    HOSPITAL COURSE:   uncomplicated  Patient was examined on the morning of discharge.  She was awake and alert and well reacted from anesthesia.  She wished to go home.  She was able to take p.o. well.  Pain was in good control.  Daughters present at bedside for discharge discussion.    DISCHARGE MEDICATIONS:      Discharge Medications      Changes to Medications      Instructions Start Date   HYDROcodone-acetaminophen 5-325 MG per tablet  Commonly known as:  MALLORY  What changed:  Another medication with the same name was added. Make sure you understand how and when to take each.   1 tablet, Oral, Every 6 Hours PRN      HYDROcodone-acetaminophen 5-325 MG per tablet  Commonly known as:  MALLORY  What changed:  You were already taking a medication with the same name, and this prescription was added. Make sure you understand how and when to take each.   1 tablet, Oral, Every 4 Hours PRN         Continue These Medications      Instructions Start Date   allopurinol 100 MG tablet  Commonly known as:  ZYLOPRIM   100 mg, Oral, Daily      amLODIPine 5 MG tablet  Commonly known as:  NORVASC   5 mg, Oral, Daily      aspirin 325 MG tablet   325 mg, Oral, Daily      CloNIDine 0.1 MG tablet  Commonly known as:  CATAPRES   0.1 mg, Oral, 2 Times Daily PRN      erythromycin 5 MG/GM ophthalmic ointment  Commonly known as:  ROMYCIN   Left Eye, Every 8 Hours Scheduled      hydrochlorothiazide 12.5 MG capsule  Commonly known as:  MICROZIDE   12.5 mg, Oral, Daily      nebivolol 10 MG tablet  Commonly known as:  BYSTOLIC   5 mg, Oral, 2 Times Daily      O2  Commonly known as:  OXYGEN   2 L/min, Inhalation, Nightly      olmesartan 40 MG tablet  Commonly known as:  BENICAR   40 mg, Oral, Daily      promethazine 25 MG tablet  Commonly known as:  PHENERGAN   1/2 to 1 tablet every 6 hours as needed for nausea or vomiting.      sodium chloride 0.65 % nasal spray   2 sprays, Nasal, 5 Times Daily             DISCHARGE INSTRUCTIONS:         Your medication list      CHANGE how you take these medications      Instructions Last Dose Given Next Dose Due   HYDROcodone-acetaminophen 5-325 MG per tablet  Commonly known as:  MALLORY  What changed:  Another medication with the same name was added. Make sure you understand how and when to take each.      Take 1 tablet  by mouth Every 6 (Six) Hours As Needed for Severe Pain .       HYDROcodone-acetaminophen 5-325 MG per tablet  Commonly known as:  NORCO  What changed:  You were already taking a medication with the same name, and this prescription was added. Make sure you understand how and when to take each.      Take 1 tablet by mouth Every 4 (Four) Hours As Needed for Moderate Pain  for up to 9 days.          CONTINUE taking these medications      Instructions Last Dose Given Next Dose Due   allopurinol 100 MG tablet  Commonly known as:  ZYLOPRIM      Take 100 mg by mouth Daily.       amLODIPine 5 MG tablet  Commonly known as:  NORVASC      Take 5 mg by mouth Daily.       aspirin 325 MG tablet      Take 325 mg by mouth Daily.       CloNIDine 0.1 MG tablet  Commonly known as:  CATAPRES      Take 0.1 mg by mouth 2 (Two) Times a Day As Needed.       erythromycin 5 MG/GM ophthalmic ointment  Commonly known as:  ROMYCIN      Administer  into the left eye Every 8 (Eight) Hours.       hydrochlorothiazide 12.5 MG capsule  Commonly known as:  MICROZIDE      Take 12.5 mg by mouth Daily.       nebivolol 10 MG tablet  Commonly known as:  BYSTOLIC      Take 5 mg by mouth 2 (Two) Times a Day.       O2  Commonly known as:  OXYGEN      Inhale 2 L/min Every Night.       olmesartan 40 MG tablet  Commonly known as:  BENICAR      Take 40 mg by mouth Daily.       promethazine 25 MG tablet  Commonly known as:  PHENERGAN      1/2 to 1 tablet every 6 hours as needed for nausea or vomiting.       sodium chloride 0.65 % nasal spray      2 sprays into the nostril(s) as directed by provider 5 (Five) Times a Day.             Where to Get Your Medications      These medications were sent to [facility] - [address] [address]    Hours:  7AM-5PM Mon-Fri Phone:  [phone]   · HYDROcodone-acetaminophen 5-325 MG per tablet         Discharge discussion was held with the Patient, Daughter.  Discharge  instructions and discharge medications were reviewed with the Patient, Daughter.  The Patient, Daughter expresses understanding of the discharge plan.  Follow-up has been arranged.  The Patient, Daughter has been instructed to call for any problems or questions.  FOLLOW UP:  Follow up in 7 to 10 days with MD Alex Mack Jr, MD  8/9/2019  11:06 AM

## 2019-08-16 ENCOUNTER — OFFICE VISIT (OUTPATIENT)
Dept: OTOLARYNGOLOGY | Facility: CLINIC | Age: 84
End: 2019-08-16

## 2019-08-16 VITALS
HEIGHT: 68 IN | HEART RATE: 70 BPM | SYSTOLIC BLOOD PRESSURE: 120 MMHG | DIASTOLIC BLOOD PRESSURE: 72 MMHG | OXYGEN SATURATION: 93 % | RESPIRATION RATE: 18 BRPM | BODY MASS INDEX: 25.52 KG/M2 | WEIGHT: 168.4 LBS | TEMPERATURE: 97.3 F

## 2019-08-16 DIAGNOSIS — H90.3 HEARING LOSS SENSORY, BILATERAL: ICD-10-CM
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
## 2019-08-16 DIAGNOSIS — S00.83XA CONTUSION OF FACE, SCALP, AND NECK, INITIAL ENCOUNTER: ICD-10-CM

## 2019-08-16 DIAGNOSIS — S10.93XA CONTUSION OF FACE, SCALP, AND NECK, INITIAL ENCOUNTER: ICD-10-CM

## 2019-08-16 DIAGNOSIS — S02.92XD CLOSED FRACTURE OF FACE BONES DUE TO FALL WITH ROUTINE HEALING, SUBSEQUENT ENCOUNTER: Primary | ICD-10-CM

## 2019-08-16 DIAGNOSIS — S00.03XA CONTUSION OF FACE, SCALP, AND NECK, INITIAL ENCOUNTER: ICD-10-CM

## 2019-08-16 DIAGNOSIS — H11.32 SUBCONJUNCTIVAL HEMATOMA, LEFT: ICD-10-CM

## 2019-08-16 PROCEDURE — 99024 POSTOP FOLLOW-UP VISIT: CPT | Performed by: OTOLARYNGOLOGY

## 2019-08-16 NOTE — PROGRESS NOTES
Alex Rm Jr, MD     FOLLOW UP NOTE     Chief Complaint   Patient presents with   • Post-op     Post-op from surgery due to facial fracture due to fall.        HISTORY OF PRESENT ILLNESS:   Accompanied by:  daughter  Angelia Munoz is a  86 y.o. female who is here for follow up. She is s/p L upper face fracture repair.  She complains of decreased energy.  Appetite better.  Pain- none  Breathing is good.She agrees face looks better.    Review of Systems  Reviewed per patient intake note and confirmed by me    Past History:  Past medical and surgical history, family history and social history reviewed and updated when appropriate.  Current medications and allergies reviewed and updated when appropriate.  Allergies:  Ciprofloxacin; Sulfa antibiotics; Tekturna [aliskiren]; and Codeine        Vital Signs:   Temp:  [97.3 °F (36.3 °C)] 97.3 °F (36.3 °C)  Heart Rate:  [70] 70  Resp:  [18] 18  BP: (120)/(72) 120/72    EXAMINATION:   FACIAL BONES: Facial bones inact, aligned and without palpable fracture Right  Frontal bone:       LEFT: intact  Orbital rims:       LEFT: IO rim medially reduced and stable, ZF suture reduced    Canthus:       Medial         RIGHT: intact      Lateral:          LEFT: intact  Malar eminence:       LEFT: good position and projection  Zygoma:       LEFT: good projection  Maxilla:       LEFT: stable with good position, occlusion intact    healing wound present- L upper sulcus with sutures intact    sutures present- L ZF area healing well    postsurgical change present- Edema mild, Ecchymosis resolving    RESULTS REVIEW:    No reports available for review     Assessment    Diagnosis Plan   1. Closed fracture of face bones due to fall with routine healing, subsequent encounter      Healing as expected   2. Contusion of face, scalp, and neck, initial encounter      Resolving   3. Subconjunctival hematoma, left      Resolving OS       Plan    Continue current management plan.   She will start  skin/Scar care  She can resume normal diet  Ophthalmology referral  Audio at next visit.  Patient, Daughter understands and agrees with the treatment plan as described.       Return in about 3 months (around 11/16/2019) for Recheck Face and hearing.    Alex Rm Jr, MD  08/16/19  9:50 AM

## 2019-08-16 NOTE — PROGRESS NOTES
Whit Aponte MA   Patient Intake Note    Review of Systems  Review of Systems   Constitutional: Positive for fatigue. Negative for chills and fever.   HENT:        See HPI   Eyes: Positive for discharge and redness. Negative for pain and itching.   Respiratory: Negative for cough, choking and wheezing.    Gastrointestinal: Negative for diarrhea, nausea and vomiting.   Musculoskeletal: Negative for neck pain and neck stiffness.   Allergic/Immunologic: Negative for environmental allergies and food allergies.   Neurological: Positive for weakness. Negative for dizziness, light-headedness and headaches.   Psychiatric/Behavioral: Positive for sleep disturbance.   All other systems reviewed and are negative.      QUALITY MEASURES    Tobacco Use: Screening and Cessation Intervention  Social History    Tobacco Use      Smoking status: Former Smoker        Types: Cigarettes        Quit date:         Years since quittin.6      Smokeless tobacco: Never Used      Tobacco comment: quit 40 years ago        Whit Aponte MA  2019  9:28 AM

## 2019-08-22 ENCOUNTER — TELEPHONE (OUTPATIENT)
Dept: CARDIOLOGY | Age: 84
End: 2019-08-22

## 2019-08-22 ENCOUNTER — OFFICE VISIT (OUTPATIENT)
Dept: CARDIOLOGY | Age: 84
End: 2019-08-22
Payer: MEDICARE

## 2019-08-22 VITALS
HEIGHT: 68 IN | BODY MASS INDEX: 26.37 KG/M2 | SYSTOLIC BLOOD PRESSURE: 118 MMHG | WEIGHT: 174 LBS | DIASTOLIC BLOOD PRESSURE: 62 MMHG | HEART RATE: 65 BPM | OXYGEN SATURATION: 98 %

## 2019-08-22 DIAGNOSIS — I35.1 MILD AORTIC INSUFFICIENCY: ICD-10-CM

## 2019-08-22 DIAGNOSIS — I47.1 PSVT (PAROXYSMAL SUPRAVENTRICULAR TACHYCARDIA) (HCC): ICD-10-CM

## 2019-08-22 DIAGNOSIS — I10 ESSENTIAL HYPERTENSION: Primary | ICD-10-CM

## 2019-08-22 PROCEDURE — 99213 OFFICE O/P EST LOW 20 MIN: CPT | Performed by: NURSE PRACTITIONER

## 2019-08-22 PROCEDURE — G8599 NO ASA/ANTIPLAT THER USE RNG: HCPCS | Performed by: NURSE PRACTITIONER

## 2019-08-22 PROCEDURE — 1123F ACP DISCUSS/DSCN MKR DOCD: CPT | Performed by: NURSE PRACTITIONER

## 2019-08-22 PROCEDURE — G8417 CALC BMI ABV UP PARAM F/U: HCPCS | Performed by: NURSE PRACTITIONER

## 2019-08-22 PROCEDURE — 4040F PNEUMOC VAC/ADMIN/RCVD: CPT | Performed by: NURSE PRACTITIONER

## 2019-08-22 PROCEDURE — G8427 DOCREV CUR MEDS BY ELIG CLIN: HCPCS | Performed by: NURSE PRACTITIONER

## 2019-08-22 PROCEDURE — 1090F PRES/ABSN URINE INCON ASSESS: CPT | Performed by: NURSE PRACTITIONER

## 2019-08-22 PROCEDURE — 1036F TOBACCO NON-USER: CPT | Performed by: NURSE PRACTITIONER

## 2019-08-22 NOTE — PATIENT INSTRUCTIONS
life. 6)  Lose weight - when you shed extra fat an unnecessary pounds, you reduce the burden on your hear, lungs, blood vessels and skeleton. You give yourself the gift of active living, you lower your blood pressure and help yourself feel better. 7) Stop smoking - cigarette smokers have a higher risk of developing cardiovascular disease. If  You smoke, quitting is the best thing you can do for your health. Check American Heart Association on line for more information on Life's Simple 7 and tips for healthy living. Patient Education        A Healthy Heart: Care Instructions  Your Care Instructions    Heart disease occurs when a substance called plaque builds up in the vessels that supply oxygen-rich blood to your heart. This can narrow the blood vessels and reduce blood flow. A heart attack happens when blood flow is completely blocked. A high-fat diet, smoking, and other factors increase the risk of heart disease. Your doctor has found that you have a chance of having heart disease. You can do lots of things to keep your heart healthy. It may not be easy, but you can change your diet, exercise more, and quit smoking. These steps really work to lower your chance of heart disease. Follow-up care is a key part of your treatment and safety. Be sure to make and go to all appointments, and call your doctor if you are having problems. It's also a good idea to know your test results and keep a list of the medicines you take. How can you care for yourself at home? Diet    · Use less salt when you cook and eat. This helps lower your blood pressure. Taste food before salting. Add only a little salt when you think you need it. With time, your taste buds will adjust to less salt.     · Eat fewer snack items, fast foods, canned soups, and other high-salt, high-fat, processed foods.     · Read food labels and try to avoid saturated and trans fats.  They increase your risk of heart disease by raising cholesterol levels.     · Limit the amount of solid fat-butter, margarine, and shortening-you eat. Use olive, peanut, or canola oil when you cook. Bake, broil, and steam foods instead of frying them.     · Eating fish can lower your risk for heart disease. Eat at least 2 servings of fish a week. [place], mackerel, herring, sardines, and chunk light tuna are very good choices. These fish contain omega-3 fatty acids.     · Eat a variety of fruit and vegetables every day. Dark green, deep orange, red, or yellow fruits and vegetables are especially good for you. Examples include spinach, carrots, peaches, and berries.     · Foods high in fiber can reduce your cholesterol and provide important vitamins and minerals. High-fiber foods include whole-grain cereals and breads, oatmeal, beans, brown rice, citrus fruits, and apples.     · Limit drinks and foods with added sugar. These include candy, desserts, and soda pop.    Lifestyle changes    · If your doctor recommends it, get more exercise. Walking is a good choice. Bit by bit, increase the amount you walk every day. Try for at least 30 minutes on most days of the week. You also may want to swim, bike, or do other activities.     · Do not smoke. If you need help quitting, talk to your doctor about stop-smoking programs and medicines. These can increase your chances of quitting for good. Quitting smoking may be the most important step you can take to protect your heart. It is never too late to quit. You will get health benefits right away.     · Limit alcohol to 2 drinks a day for men and 1 drink a day for women. Too much alcohol can cause health problems. Medicines    · Take your medicines exactly as prescribed. Call your doctor if you think you are having a problem with your medicine.     · If your doctor recommends aspirin, take the amount directed each day. Make sure you take aspirin and not another kind of pain reliever, such as acetaminophen (Tylenol).  If you take ibuprofen

## 2019-08-22 NOTE — PROGRESS NOTES
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     Past Surgical History:   Procedure Laterality Date    BREAST BIOPSY  1992&2004    CATARACT REMOVAL      CHOLECYSTECTOMY  1996    CRANIOTOMY  2010    DIAGNOSTIC CARDIAC CATH LAB PROCEDURE      FACIAL RECONSTRUCTION SURGERY      HYSTERECTOMY  1971    TUMOR REMOVAL  2009    brain tumor (meningioma)     Family History   Problem Relation Age of Onset    Coronary Art Dis Father     Heart Attack Father     Cancer Sister     Heart Attack Mother     Stroke Mother     Cancer Sister     Cancer Sister      Social History     Tobacco Use    Smoking status: Former Smoker     Packs/day: 1.00     Years: 8.00     Pack years: 8.00    Smokeless tobacco: Never Used    Tobacco comment: Quit 40+ years ago. Substance Use Topics    Alcohol use: No     Comment: occasional      Current Outpatient Medications   Medication Sig Dispense Refill    nebivolol (BYSTOLIC) 5 MG tablet Take 1 tablet by mouth 2 times daily 60 tablet 5    amLODIPine (NORVASC) 5 MG tablet Take 1 tablet by mouth daily 30 tablet 5    BENICAR 20 MG tablet Take 1 tablet by mouth 2 times daily (Patient taking differently: Take 20 mg by mouth daily ) 60 tablet 5    hydrochlorothiazide (HYDRODIURIL) 25 MG tablet Take 1 tablet by mouth every morning 30 tablet 5    cloNIDine (CATAPRES) 0.1 MG tablet Take 1 tablet by mouth 2 times daily as needed for High Blood Pressure (for BP > 160/90) 60 tablet 3    aspirin 325 MG tablet Take 1 tablet by mouth daily 30 tablet 3    allopurinol (ZYLOPRIM) 100 MG tablet Take 100 mg by mouth daily.

## 2019-09-06 RX ORDER — HYDROCHLOROTHIAZIDE 25 MG/1
25 TABLET ORAL EVERY MORNING
Qty: 30 TABLET | Refills: 5 | Status: SHIPPED | OUTPATIENT
Start: 2019-09-06 | End: 2019-11-13 | Stop reason: DRUGHIGH

## 2019-11-12 ENCOUNTER — TELEPHONE (OUTPATIENT)
Dept: CARDIOLOGY | Age: 84
End: 2019-11-12

## 2019-11-13 ENCOUNTER — OFFICE VISIT (OUTPATIENT)
Dept: CARDIOLOGY | Age: 84
End: 2019-11-13
Payer: MEDICARE

## 2019-11-13 VITALS
SYSTOLIC BLOOD PRESSURE: 122 MMHG | DIASTOLIC BLOOD PRESSURE: 76 MMHG | BODY MASS INDEX: 26.36 KG/M2 | HEIGHT: 69 IN | WEIGHT: 178 LBS | HEART RATE: 65 BPM

## 2019-11-13 DIAGNOSIS — I47.1 PSVT (PAROXYSMAL SUPRAVENTRICULAR TACHYCARDIA) (HCC): Primary | ICD-10-CM

## 2019-11-13 DIAGNOSIS — I34.0 MODERATE MITRAL REGURGITATION: ICD-10-CM

## 2019-11-13 DIAGNOSIS — I10 ESSENTIAL HYPERTENSION: ICD-10-CM

## 2019-11-13 PROCEDURE — 1090F PRES/ABSN URINE INCON ASSESS: CPT | Performed by: INTERNAL MEDICINE

## 2019-11-13 PROCEDURE — G8417 CALC BMI ABV UP PARAM F/U: HCPCS | Performed by: INTERNAL MEDICINE

## 2019-11-13 PROCEDURE — 93000 ELECTROCARDIOGRAM COMPLETE: CPT | Performed by: INTERNAL MEDICINE

## 2019-11-13 PROCEDURE — 99213 OFFICE O/P EST LOW 20 MIN: CPT | Performed by: INTERNAL MEDICINE

## 2019-11-13 PROCEDURE — 1123F ACP DISCUSS/DSCN MKR DOCD: CPT | Performed by: INTERNAL MEDICINE

## 2019-11-13 PROCEDURE — G8599 NO ASA/ANTIPLAT THER USE RNG: HCPCS | Performed by: INTERNAL MEDICINE

## 2019-11-13 PROCEDURE — G8484 FLU IMMUNIZE NO ADMIN: HCPCS | Performed by: INTERNAL MEDICINE

## 2019-11-13 PROCEDURE — G8427 DOCREV CUR MEDS BY ELIG CLIN: HCPCS | Performed by: INTERNAL MEDICINE

## 2019-11-13 PROCEDURE — 4040F PNEUMOC VAC/ADMIN/RCVD: CPT | Performed by: INTERNAL MEDICINE

## 2019-11-13 PROCEDURE — 1036F TOBACCO NON-USER: CPT | Performed by: INTERNAL MEDICINE

## 2019-11-13 RX ORDER — HYDROCHLOROTHIAZIDE 12.5 MG/1
12.5 TABLET ORAL DAILY
Qty: 30 TABLET | Refills: 6 | Status: SHIPPED | OUTPATIENT
Start: 2019-11-13 | End: 2020-08-04 | Stop reason: SDUPTHER

## 2019-11-15 ENCOUNTER — OFFICE VISIT (OUTPATIENT)
Dept: OTOLARYNGOLOGY | Facility: CLINIC | Age: 84
End: 2019-11-15

## 2019-11-15 VITALS
SYSTOLIC BLOOD PRESSURE: 112 MMHG | OXYGEN SATURATION: 99 % | HEART RATE: 56 BPM | BODY MASS INDEX: 26.52 KG/M2 | TEMPERATURE: 98.5 F | RESPIRATION RATE: 20 BRPM | WEIGHT: 175 LBS | HEIGHT: 68 IN | DIASTOLIC BLOOD PRESSURE: 76 MMHG
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
## 2019-11-15 DIAGNOSIS — H90.3 HEARING LOSS SENSORY, BILATERAL: ICD-10-CM

## 2019-11-15 DIAGNOSIS — Z97.4 PRESENCE OF EXTERNAL HEARING AID: ICD-10-CM

## 2019-11-15 DIAGNOSIS — S02.92XD CLOSED FRACTURE OF FACE BONES DUE TO FALL WITH ROUTINE HEALING, SUBSEQUENT ENCOUNTER: Primary | ICD-10-CM

## 2019-11-15 DIAGNOSIS — H02.402 PTOSIS OF LEFT EYELID: ICD-10-CM

## 2019-11-15 PROCEDURE — 99213 OFFICE O/P EST LOW 20 MIN: CPT | Performed by: OTOLARYNGOLOGY

## 2019-11-15 NOTE — PROGRESS NOTES
Judith Paredes MA   Patient Intake Note    Review of Systems  Review of Systems   Constitutional: Positive for fatigue. Negative for chills and fever.   HENT: Positive for hearing loss. Negative for ear discharge, ear pain, sinus pressure, sinus pain, sneezing, sore throat and tinnitus.    Gastrointestinal: Negative for diarrhea, nausea and vomiting.   Neurological: Negative for dizziness, weakness, light-headedness and headaches.       QUALITY MEASURES    Tobacco Use: Screening and Cessation Intervention  Social History    Tobacco Use      Smoking status: Former Smoker        Types: Cigarettes        Quit date:         Years since quittin.8      Smokeless tobacco: Never Used      Tobacco comment: quit 40 years ago        Judith Paredes MA  11/15/2019  1:00 PM

## 2019-11-15 NOTE — PATIENT INSTRUCTIONS
APPLICATION OF HEAT AND ICE    At times, judicious application of heat or ice can accelerate healing, diminish swelling and reduce pain. Dr. Rm will frequently prescribe heat, ice or both as part of the treatment of your injury or surgery.     How to apply ice:  Never apply ice directly to the skin. Always place an insulating layer, such as a washcloth or ice bag, between the ice and the skin.  Ice bags can be made of zip lock bags, water and ice, wrapped in a washcloth. Do not fill the bag too full and this will conform well around curves of the body, head and neck.  If your injury has just occurred, remember rest, ice, compression, and elevation (RICE). In an acute injury, ice is best applied for 48 to 72 hours to minimize swelling and pain. Doing this as often as possible (at least 4 times daily, but constantly if possible).     How to apply heat:  Never apply a heating pad while sleeping. This may lead to a burn. Heating pads apply continuous heat that can build up while sleeping.  Hot water bottles are excellent for applying heat.  Make a hot compress by heating a washcloth in the microwave, placing it in a zip lock bag and apply to the affected area.  You can use either dry heat or moist heat, whichever feels the best. Using moist heat will loosen scabbing and crusting, making the scabs easier to remove. This will also unstick eyelids that are stuck together.   To alternate heat and ice application for 15 minutes each, several times daily.    Skin tightening cream (Oil of [place], Neutrogena)    Thank you for enrolling in [facility]. Please follow the instructions below to securely access your online medical record. [facility] allows you to send messages to your doctor, view your test results, renew your prescriptions, schedule appointments, and more.    How Do I Sign Up?  1. In your Internet browser, go to [employer]  2. Click on the Sign Up Now link in the New User? box.   3. Enter your [employer]  Activation Code exactly as it appears below. You will not need to use this code after you have completed the sign-up process. If you do not sign up before the expiration date, you must request a new code.  [employer] Activation Code: Activation code not generated  Current [employer] Status: Active    4. Enter the last four digits of your Social Security Number and your Date of Birth as indicated and click Next. You will be taken to the next sign-up page.  5. Create a [employer] username. Think of one that is secure and easy to remember.  6. Create a [employer] password. You can change your password at any time.  7. Choose a security question, enter your answer, and click Next. This can be used to access [employer] if you forget your password.   8. Select your communication preference. Enter a valid e-mail address if you would like to receive e-mail notifications when new information is available in [employer].  9. Click Sign In. You can now view your medical record.     Additional Information  If you have questions, you can e-mail [email or web address], or call [phone] to talk to our [employer] staff. Remember, [employer] is NOT to be used for urgent needs. For medical emergencies, dial 911.

## 2019-11-15 NOTE — PROGRESS NOTES
Alex Rm Jr, MD     FOLLOW UP NOTE     Chief Complaint   Patient presents with   • Follow-up     still having numbness, and pain eye lid, cheek area.    • Hearing Loss        HISTORY OF PRESENT ILLNESS:   Accompanied by:  daughter  Angelia Munoz is a  86 y.o. female who is here for follow up. She is doing well.  She has L Cheek and lip numbness.  She has tenderness with chewing.  Dental evaluation- no problems.    Review of Systems  Reviewed per patient intake note and confirmed by me    Past History:  Past medical and surgical history, family history and social history reviewed and updated when appropriate.  Current medications and allergies reviewed and updated when appropriate.  Allergies:  Ciprofloxacin; Sulfa antibiotics; Tekturna [aliskiren]; and Codeine        Vital Signs:   Temp:  [98.5 °F (36.9 °C)] 98.5 °F (36.9 °C)  Heart Rate:  [56] 56  Resp:  [20] 20  BP: (112)/(76) 112/76    EXAMINATION:   FACIAL BONES: Facial bones inact, aligned and without palpable fracture Right  Frontal bone:       LEFT: intact  Orbital rims:       LEFT: IO rim medially reduced and stable, ZF suture reduced    Canthus:       Medial         RIGHT: intact      Lateral:          LEFT: intact  Malar eminence:       LEFT: good position and projection  Zygoma:       LEFT: good projection  Maxilla:       LEFT: stable with good position, occlusion intact    healing wound present- L upper sulcus with sutures intact    sutures present- L ZF area healing well    postsurgical change present- Edema mild, Ecchymosis resolving    EYE:    ocular motility normal, eyelids normal, orbits normal, no proptosis, conjunctiva clear, sclera non-icteric, pupils equal, round, reactive to light and accomodation      Right  OS    Pupil- left-  normal    Conjunctiva-  normal    Sclera-  non-icteric      Cornea- clear, non-ulcerative    EOM-  intact    Eyelids- left-  lateral upper eyelid with overhanging skin causing lateral hooding       ASSESSMENT:       Diagnosis Plan   1. Closed fracture of face bones due to fall with routine healing, subsequent encounter      Stable L face   2. Hearing loss sensory, bilateral  Comprehensive Hearing Test    With hearing aids   3. Presence of external hearing aid      AU   4. Ptosis of left eyelid      lateral, from edema and scarring           PLAN:   Continue current management plan.  Patient is healing fracture well.  She has L lateral hooding from edema and is using Eyebrow to elevate L upper lid.  I will add alternate cool and hot soaks. I will have patient use OTC tightening creams to L eye and lateral canthus.  I will re-evaluate for L upper lid bleph for scarring  Hearing- I will evaluate next visit  Eye care  Tightening cream to OS  Heat/Cool  MY CHART:  Patient is Encouraged to enroll in My Chart  Encouraged to review data and findings in My Chart     Orders Placed This Encounter   Procedures   • Comprehensive Hearing Test       Patient, Daughter understand(s) and agree(s) with the treatment plan as described.    Return in about 2 months (around 1/15/2020) for Recheck L eyelid.     Alex Rm Jr, MD  11/15/19  1:25 PM

## 2020-01-06 ENCOUNTER — OFFICE VISIT (OUTPATIENT)
Dept: PULMONOLOGY | Facility: CLINIC | Age: 85
End: 2020-01-06

## 2020-01-06 VITALS
WEIGHT: 180 LBS | DIASTOLIC BLOOD PRESSURE: 68 MMHG | HEART RATE: 70 BPM | HEIGHT: 68 IN | BODY MASS INDEX: 27.28 KG/M2 | SYSTOLIC BLOOD PRESSURE: 118 MMHG | OXYGEN SATURATION: 98 %

## 2020-01-06 DIAGNOSIS — J44.9 STAGE 2 MODERATE COPD BY GOLD CLASSIFICATION (HCC): Primary | ICD-10-CM

## 2020-01-06 DIAGNOSIS — J41.0 SIMPLE CHRONIC BRONCHITIS (HCC): ICD-10-CM

## 2020-01-06 PROBLEM — J41.8 MIXED SIMPLE AND MUCOPURULENT CHRONIC BRONCHITIS (HCC): Status: RESOLVED | Noted: 2019-06-26 | Resolved: 2020-01-06

## 2020-01-06 PROCEDURE — 99213 OFFICE O/P EST LOW 20 MIN: CPT | Performed by: INTERNAL MEDICINE

## 2020-01-06 NOTE — PROGRESS NOTES
Subjective   Angelia Munoz is a 86 y.o. female.     Chief Complaint   Patient presents with   • COPD      No My Sticky Note on file.    History of Present Illness   The patient doing quite well from a pulmonary standpoint.  She did have a fall this past August and suffered some facial fractures and underwent surgical treatment of same and has recovered very nicely.  She is having no respiratory tract symptoms at this time.    Medical/Family/Social History   has a past medical history of COPD (chronic obstructive pulmonary disease) (CMS/HCC), History of meningioma, Hypertension, Maxillary fracture (CMS/[facility]), Mixed simple and mucopurulent chronic bronchitis (CMS/[facility]) (6/26/2019), Nasal fracture, Orbital fracture, and Stroke (CMS/[facility]), right frontal region (02/2018).   has a past surgical history that includes Gallbladder surgery; Hysterectomy; Craniotomy (Right, 2010); Breast biopsy; Eye surgery; and Maxillary Fracture Open Reduction Internal Fixation (Left, 8/8/2019).  family history includes Emphysema in her father; Heart disease in her father; No Known Problems in her mother.   reports that she quit smoking about 31 years ago. Her smoking use included cigarettes. She has a 5.00 pack-year smoking history. She has never used smokeless tobacco. She reports that she drinks alcohol. She reports that she does not use drugs.  Allergies   Allergen Reactions   • Ciprofloxacin Nausea Only   • Sulfa Antibiotics Rash          • Tekturna [Aliskiren] Other (See Comments)     headache   • Codeine Nausea Only     Not really sure what it did to her     Medications    Current Outpatient Medications:   •  allopurinol (ZYLOPRIM) 100 MG tablet, Take 100 mg by mouth Daily., Disp: , Rfl:   •  amLODIPine (NORVASC) 5 MG tablet, Take 5 mg by mouth Daily., Disp: , Rfl:   •  aspirin 325 MG tablet, Take 325 mg by mouth Daily., Disp: , Rfl:   •  hydrochlorothiazide (MICROZIDE) 12.5 MG capsule, Take 12.5 mg by mouth Daily., Disp: , Rfl: 1  •   "nebivolol (BYSTOLIC) 10 MG tablet, Take 5 mg by mouth 2 (Two) Times a Day., Disp: , Rfl:   •  O2 (OXYGEN), Inhale 2 L/min Every Night., Disp: , Rfl:   •  olmesartan (BENICAR) 40 MG tablet, Take 40 mg by mouth Daily., Disp: , Rfl: 4    Review of Systems   Constitutional: Negative for chills and fever.   HENT: Negative for congestion.    Eyes: Negative for visual disturbance.   Respiratory: Negative for cough and shortness of breath.    Cardiovascular: Negative for chest pain.   Gastrointestinal: Negative for diarrhea, nausea and vomiting.   Genitourinary: Negative for difficulty urinating.   Musculoskeletal: Negative for arthralgias.   Skin: Negative for rash.   Neurological: Negative for dizziness and speech difficulty.   Psychiatric/Behavioral: The patient is not nervous/anxious.      ------------------------------------  Objective   /68   Pulse 70   Ht 172.7 cm (68\")   Wt 81.6 kg (180 lb)   SpO2 98% Comment: RA  Breastfeeding No   BMI 27.37 kg/m²   Physical Exam   Constitutional: She is oriented to person, place, and time. She appears well-developed and well-nourished.   HENT:   Head: Normocephalic and atraumatic.   The patient again has had her recent facial surgery and this appears to have healed very well.   Eyes: Pupils are equal, round, and reactive to light. EOM are normal.   Neck: Normal range of motion. Neck supple.   Cardiovascular: Normal rate, regular rhythm and normal heart sounds.   Pulmonary/Chest: Effort normal and breath sounds normal.   Abdominal: Soft.   Musculoskeletal: Normal range of motion.   Neurological: She is alert and oriented to person, place, and time.   Skin: Skin is warm and dry.   Psychiatric: She has a normal mood and affect.   Nursing note and vitals reviewed.            Pulmonary Functions Testing Results:  PFT Values        Some values may be hidden. Unless noted otherwise, only the newest values recorded on each date are displayed.         Old Values PFT Results " 3/27/19 6/26/19   FVC 61% 67%   FEV1 50% 60%   FEV1/FVC 61.17% 66.35%      Pre Drug PFT Results 3/27/19 6/26/19   No data to display.      Post Drug PFT Results 3/27/19 6/26/19   No data to display.      Other Tests PFT Results 3/27/19 6/26/19   No data to display.            Assessment/Plan   Angelia was seen today for copd.    Diagnoses and all orders for this visit:    Stage 2 moderate COPD by GOLD classification (CMS/[facility])    Simple chronic bronchitis (CMS/[facility])      Patient's Body mass index is 27.37 kg/m². BMI is within normal parameters. No follow-up required..      I will see her back in about 6 months with a flow volume loop on return and if she is doing well clinically at that time and pulmonary functions are reasonably stable just plan on seeing her back on a once yearly basis.

## 2020-01-06 NOTE — PATIENT INSTRUCTIONS
The patient is doing very well from a pulmonary standpoint.  We will see her back in 6 months with a flow volume loop on return.  If she is doing well at that time we will just plan on seeing her back on a once yearly basis.

## 2020-01-07 RX ORDER — AMLODIPINE BESYLATE 5 MG/1
TABLET ORAL
Qty: 30 TABLET | Refills: 5 | Status: SHIPPED | OUTPATIENT
Start: 2020-01-07 | End: 2020-05-13 | Stop reason: SDUPTHER

## 2020-01-07 RX ORDER — OLMESARTAN MEDOXOMIL 40 MG/1
TABLET ORAL
Qty: 30 TABLET | Refills: 5 | Status: SHIPPED | OUTPATIENT
Start: 2020-01-07 | End: 2020-06-05

## 2020-01-17 ENCOUNTER — PROCEDURE VISIT (OUTPATIENT)
Dept: OTOLARYNGOLOGY | Facility: CLINIC | Age: 85
End: 2020-01-17

## 2020-01-17 ENCOUNTER — OFFICE VISIT (OUTPATIENT)
Dept: OTOLARYNGOLOGY | Facility: CLINIC | Age: 85
End: 2020-01-17

## 2020-01-17 VITALS
HEIGHT: 69 IN | SYSTOLIC BLOOD PRESSURE: 145 MMHG | WEIGHT: 170 LBS | HEART RATE: 65 BPM | BODY MASS INDEX: 25.18 KG/M2 | DIASTOLIC BLOOD PRESSURE: 79 MMHG | RESPIRATION RATE: 20 BRPM | TEMPERATURE: 98 F

## 2020-01-17 DIAGNOSIS — H90.3 HEARING LOSS SENSORY, BILATERAL: ICD-10-CM

## 2020-01-17 DIAGNOSIS — H93.293 IMPAIRMENT OF AUDITORY DISCRIMINATION OF BOTH EARS: ICD-10-CM

## 2020-01-17 DIAGNOSIS — H90.3 SNHL (SENSORY-NEURAL HEARING LOSS), ASYMMETRICAL: Primary | ICD-10-CM

## 2020-01-17 DIAGNOSIS — Z97.4 PRESENCE OF EXTERNAL HEARING AID: ICD-10-CM
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
## 2020-01-17 DIAGNOSIS — S02.92XD CLOSED FRACTURE OF FACE BONES DUE TO FALL WITH ROUTINE HEALING, SUBSEQUENT ENCOUNTER: Primary | ICD-10-CM

## 2020-01-17 DIAGNOSIS — H02.402 PTOSIS OF LEFT EYELID: ICD-10-CM

## 2020-01-17 PROCEDURE — 99213 OFFICE O/P EST LOW 20 MIN: CPT | Performed by: OTOLARYNGOLOGY

## 2020-01-17 NOTE — PROGRESS NOTES
Alex Rm Jr, MD     ENT FOLLOW UP NOTE     Chief Complaint   Patient presents with   • Follow-up     Facial fx repair        HISTORY OF PRESENT ILLNESS:  Accompanied by:  Daughter  Angelia Munoz is a  86 y.o. female who is here for follow up. Since last visit, she has had no face issues.  L upper teeth do not feel the same. Numbness decreasing.  Eye is watering some    Review of Systems  Reviewed per patient intake note and confirmed by me    Past History:  Past medical and surgical history, family history and social history reviewed and updated when appropriate.  Current medications and allergies reviewed and updated when appropriate.  Allergies:  Ciprofloxacin; Sulfa antibiotics; Tekturna [aliskiren]; and Codeine        Vital Signs:   Temp:  [98 °F (36.7 °C)] 98 °F (36.7 °C)  Heart Rate:  [65] 65  Resp:  [20] 20  BP: (145)/(79) 145/79  EXAMINATION:  CONSTITUTIONAL:    well nourished, well-developed, alert, oriented, in no acute distress     BODY HABITUS:    Normal body habitus    COMMUNICATION:    able to communicate normally, normal voice quality    HEAD:     Normocephalic, without obvious abnormality, atraumatic    FACE:    structure normal, no tenderness present, no lesions/masses, no evidence of trauma    SALIVARY GLANDS:    parotid glands with no tenderness, no swelling, no masses, submandibular glands with normal size, nontender     EYE:    ocular motility normal, eyelids normal, orbits normal, no proptosis, conjunctiva clear, sclera non-icteric, pupils equal, round, reactive to light and accomodation  Color:   blue    Lateral canthus OS well healed without hooding    HEARING:      response to conversational voice decreased  Bilateral  Hearing aids present  Bilateral    EARS:    Otoscopic exam   normal pinna with no lesions, Canals normal size and shape, Tympanic membranes normal, Ossicular chain intact, Middle ear clear     NOSE EXTERNAL:    APPEARANCE: normal, straight, with good projection, no  tenderness, no lesions, no tenderness, good nasal support, patent nares    NOSE INTERNAL:    Anterior rhinoscopy  intact mucosa, normal inferior turbinates, septum midline without perforation, secretions clear and normal amount, nares patent, normal nasal airway without obstruction, no lesions    ORAL CAVITY:    Normal lips with no lesions, dentition normal for age, FOM intact without lesions and normal salivary flow, Mucosa intact without lesions, Hard and soft palate normal without lesions  L upper sulcus well healed    OROPHARYNX:    Direct examination  oropharyngeal mucosa normal, tonsil(s) with normal appearance      NECK:    normal appearance, no masses, no lesions, larynx normal mobility, trachea midline    LYMPH NODES :    no adenopathy    THYROID:    no overt thyromegaly, no tenderness, nodules or mass present on palpation, position midline    CHEST/RESPIRATORY:    respiratory effort normal, no rales, rubs or wheezing, no stridor, normal appearance to chest    CARDIOVASCULAR:    regular rate and rhythm, no murmurs, gallups, no peripheral edema    NEURO/PSYCHIATRIC :    oriented appropriately for age, mood normal, affect appropriate, cranial nerves intact grossly (unless specifically described), gait normal for age    RESULTS REVIEW:    Audiologic testing reviewed.      Signed             Copied by Venessa Peterson  at 01/17/20 1351  from outside the chart                   ASSESSMENT:   Diagnosis Plan   1. Closed fracture of face bones due to fall with routine healing, subsequent encounter      Weel healed  IO nerve healing well   2. Hearing loss sensory, bilateral     3. Presence of external hearing aid     4. Ptosis of left eyelid      resolved lateral hooding   5. 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  Patient has discrimination issues causing hearing deficit. She is already aided. I will refer for hearing  aid evaluation.  Face is stable and well healed. IO nerve is revoering as expected.  Ear care with oil  No therapy to face  MY CHART:  Patient is Encouraged to enroll in My Chart  Encouraged to review data and findings in My Chart          Patient, Daughter understand(s) and agree(s) with the treatment plan as described.    Return in about 6 months (around 7/17/2020) for Recheck ears, hearing face.     Alex Rm Jr, MD  01/17/20  2:08 PM

## 2020-01-17 NOTE — PATIENT INSTRUCTIONS
EAR CARE: :using oil  Use a hair dryer on low heat and blow into the ear canals 2 times daily to keep ears dry.  DO Not use Q tips or Eloina pins, ever!!  Do not use alcohol in the ear canal (this will cause dryness and itching)  NO peroxide or alcohol in ears  Oil: Use Sweet oil, Olive oil, or Mineral oil, purchased over the counter, once a week, Do not use Q tips, You may use a hair dryer on low heat. Blow in ears for 10-15 seconds 2 times daily to dry ear canals or if ear canals are itching.    Wearing hearing aids    Thank you for enrolling in [facility]. Please follow the instructions below to securely access your online medical record. [facility] allows you to send messages to your doctor, view your test results, renew your prescriptions, schedule appointments, and more.    How Do I Sign Up?  1. In your Internet browser, go to [employer]  2. Click on the Sign Up Now link in the New User? box.   3. Enter your [facility] Activation Code exactly as it appears below. You will not need to use this code after you have completed the sign-up process. If you do not sign up before the expiration date, you must request a new code.  [facility] Activation Code: Activation code not generated  Current [facility] Status: Active    4. Enter the last four digits of your Social Security Number and your Date of Birth as indicated and click Next. You will be taken to the next sign-up page.  5. Create a [facility] username. Think of one that is secure and easy to remember.  6. Create a [facility] password. You can change your password at any time.  7. Choose a security question, enter your answer, and click Next. This can be used to access [facility] if you forget your password.   8. Select your communication preference. Enter a valid e-mail address if you would like to receive e-mail notifications when new information is available in [facility].  9. Click Sign In. You can now view your medical record.     Additional Information  If you have  questions, you can e-mail [email or web address], or call [phone] to talk to our [employer] staff. Remember, [employer] is NOT to be used for urgent needs. For medical emergencies, dial 911.

## 2020-01-17 NOTE — PROGRESS NOTES
Jacobo Mancera MA   Patient Intake Note    Review of Systems  Review of Systems   Constitutional: Negative for appetite change.   HENT: Negative for congestion and ear pain.    Eyes: Negative for pain.   Respiratory: Negative for cough and shortness of breath.    Gastrointestinal: Negative for nausea and vomiting.   Musculoskeletal: Negative for neck pain and neck stiffness.   Neurological: Negative for dizziness and headaches.   Psychiatric/Behavioral: Negative for sleep disturbance.       Tobacco Use: Screening and Cessation Intervention  Social History    Tobacco Use      Smoking status: Former Smoker        Packs/day: 0.50        Years: 10.00        Pack years: 5        Types: Cigarettes        Quit date:         Years since quittin.0      Smokeless tobacco: Never Used      Tobacco comment: quit 40 years ago        Jacobo Mancera MA  2020  12:58 PM

## 2020-01-29 ENCOUNTER — OFFICE VISIT (OUTPATIENT)
Dept: NEUROLOGY | Facility: CLINIC | Age: 85
End: 2020-01-29

## 2020-01-29 VITALS
WEIGHT: 178 LBS | HEIGHT: 69 IN | DIASTOLIC BLOOD PRESSURE: 72 MMHG | BODY MASS INDEX: 26.36 KG/M2 | HEART RATE: 62 BPM | RESPIRATION RATE: 18 BRPM | SYSTOLIC BLOOD PRESSURE: 128 MMHG

## 2020-01-29 DIAGNOSIS — I63.9 CEREBROVASCULAR ACCIDENT (CVA), UNSPECIFIED MECHANISM (HCC): Primary | ICD-10-CM

## 2020-01-29 PROCEDURE — 99213 OFFICE O/P EST LOW 20 MIN: CPT | Performed by: PSYCHIATRY & NEUROLOGY

## 2020-01-29 NOTE — PROGRESS NOTES
Subjective   Angelia Munoz, 3/26/1933, is a female who is being seen today for   Chief Complaint   Patient presents with   • Stroke       HISTORY OF PRESENT ILLNESS: Patient previously seen for stroke.  Patient had a significant fall several months ago with fractures in her left periorbital area and required plating.  Patient has not had any recent stroke symptoms and no complaints of headache recently.  Patient has had no further falls.  Patient did experience some numbness where the surgery runs in the left periorbital area but that is improved.  Patient continues her 325 mg aspirin and has been discontinued on her statin by her PCP.  Patient has some elevation of BUN and needs to follow with PCP regarding that.  Previous meningioma has been stable.  Patient is using her oxygen 2 L/min nightly per nasal cannula.    REVIEW OF SYSTEMS:   GENERAL: Blood pressure today 128/72 left arm seated 140/80 left arm standing.  Pulse 62 and regular  PULMONARY: Patient occasionally gets short of breath with exercise  CVS: No acute chest pain or palpitation  GASTROINTESTINAL: No acute GI distress  GENITOURINARY: No acute  distress  GYN: No acute GYN distress  MUSCULOSKELETAL: Patient has history of gout  HEENT: No acute vision problems.  Patient wears hearing aids  ENDOCRINE: No acute endocrine symptoms  PSYCHIATRIC: No acute psychiatric symptoms  HEMATOLOGY: No recent blood work for review  SKIN: No acute skin changes other than as noted above with bruising in the suborbital region  Family history reviewed and otherwise noncontributory  Social history: Patient denies smoking.  Patient denies drug use.  Patient does use alcohol.    PHYSICAL EXAMINATION:    GENERAL: No acute distress  CRANIUM: Status post craniotomy  HEENT:       EYES: EOMs intact without nystagmus and fields full to confrontation.  No acute fundic abnormalities.  Pupils equal round reactive to light.       EARS: Tympanic membrane on the left partially obscured  by wax but hears tuning fork bilaterally with hearing aids in       THROAT: No acute oropharynx abnormalities       NECK: No bruits/no lymphadenopathy  CHEST: No acute cardiopulmonary abnormalities by auscultation  ABDOMEN: Nondistended  EXTREMITIES: Dorsalis pedis pulses symmetrical  NEURO: Patient alert and follows commands without difficulty  SPEECH: Normal    CRANIAL NERVES: Motor/sensory about the face normal and symmetric    MOTOR STRENGTH: Motor strength upper and lower extremities normal  STATION AND GAIT: Gait normal/Romberg negative  CEREBELLAR: Finger-nose and heel-to-shin normal  SENSORY: Some decrease in pin and vibration distal proximal in the lower extremities to the ankles bilaterally but otherwise normal pin and vibration throughout  REFLEXES: Reflexes decreased at the ankle jerk and present of the knee jerk and biceps without clonus or Babinski      ASSESSMENT AND PLAN: Patient with a history of CVA stable.  Patient to have driving precautions and safety precautions and fall precautions.  Patient to get to emergency room immediately if stroke symptoms occur.Patient's Body mass index is 26.29 kg/m². BMI is within normal parameters. No follow-up required..      There are no diagnoses linked to this encounter.    Dictated utilizing Dragon voice recognition software

## 2020-01-29 NOTE — PATIENT INSTRUCTIONS
Patient to continue on fall precautions and safety precautions as reviewed.  Patient to continue driving precautions.  Patient to get to emergency room  immediately if stroke symptoms occur.

## 2020-02-05 RX ORDER — NEBIVOLOL HYDROCHLORIDE 5 MG/1
TABLET ORAL
Qty: 60 TABLET | Refills: 3 | Status: SHIPPED | OUTPATIENT
Start: 2020-02-05 | End: 2020-06-03

## 2020-05-13 ENCOUNTER — OFFICE VISIT (OUTPATIENT)
Dept: CARDIOLOGY | Age: 85
End: 2020-05-13
Payer: MEDICARE

## 2020-05-13 VITALS
BODY MASS INDEX: 26.07 KG/M2 | DIASTOLIC BLOOD PRESSURE: 60 MMHG | HEIGHT: 69 IN | HEART RATE: 60 BPM | WEIGHT: 176 LBS | SYSTOLIC BLOOD PRESSURE: 136 MMHG

## 2020-05-13 PROBLEM — I51.89 GRADE I DIASTOLIC DYSFUNCTION: Status: ACTIVE | Noted: 2020-05-13

## 2020-05-13 PROCEDURE — 1123F ACP DISCUSS/DSCN MKR DOCD: CPT | Performed by: NURSE PRACTITIONER

## 2020-05-13 PROCEDURE — G8427 DOCREV CUR MEDS BY ELIG CLIN: HCPCS | Performed by: NURSE PRACTITIONER

## 2020-05-13 PROCEDURE — 99213 OFFICE O/P EST LOW 20 MIN: CPT | Performed by: NURSE PRACTITIONER

## 2020-05-13 PROCEDURE — 4040F PNEUMOC VAC/ADMIN/RCVD: CPT | Performed by: NURSE PRACTITIONER

## 2020-05-13 PROCEDURE — G8417 CALC BMI ABV UP PARAM F/U: HCPCS | Performed by: NURSE PRACTITIONER

## 2020-05-13 PROCEDURE — 1036F TOBACCO NON-USER: CPT | Performed by: NURSE PRACTITIONER

## 2020-05-13 PROCEDURE — 1090F PRES/ABSN URINE INCON ASSESS: CPT | Performed by: NURSE PRACTITIONER

## 2020-05-13 RX ORDER — AMLODIPINE BESYLATE 5 MG/1
5 TABLET ORAL DAILY
Qty: 90 TABLET | Refills: 3 | Status: SHIPPED | OUTPATIENT
Start: 2020-05-13 | End: 2021-07-07

## 2020-05-13 NOTE — PATIENT INSTRUCTIONS
your hear, lungs, blood vessels and skeleton. You give yourself the gift of active living, you lower your blood pressure and help yourself feel better. 7) Stop smoking - cigarette smokers have a higher risk of developing cardiovascular disease. If  You smoke, quitting is the best thing you can do for your health. Check American Heart Association on line for more information on Life's Simple 7 and tips for healthy living. Patient Education        A Healthy Heart: Care Instructions  Your Care Instructions    Heart disease occurs when a substance called plaque builds up in the vessels that supply oxygen-rich blood to your heart. This can narrow the blood vessels and reduce blood flow. A heart attack happens when blood flow is completely blocked. A high-fat diet, smoking, and other factors increase the risk of heart disease. Your doctor has found that you have a chance of having heart disease. You can do lots of things to keep your heart healthy. It may not be easy, but you can change your diet, exercise more, and quit smoking. These steps really work to lower your chance of heart disease. Follow-up care is a key part of your treatment and safety. Be sure to make and go to all appointments, and call your doctor if you are having problems. It's also a good idea to know your test results and keep a list of the medicines you take. How can you care for yourself at home? Diet    · Use less salt when you cook and eat. This helps lower your blood pressure. Taste food before salting. Add only a little salt when you think you need it. With time, your taste buds will adjust to less salt.     · Eat fewer snack items, fast foods, canned soups, and other high-salt, high-fat, processed foods.     · Read food labels and try to avoid saturated and trans fats. They increase your risk of heart disease by raising cholesterol levels.     · Limit the amount of solid fat-butter, margarine, and shortening-you eat.  Use olive, peanut, or canola oil when you cook. Bake, broil, and steam foods instead of frying them.     · Eat a variety of fruit and vegetables every day. Dark green, deep orange, red, or yellow fruits and vegetables are especially good for you. Examples include spinach, carrots, peaches, and berries.     · Foods high in fiber can reduce your cholesterol and provide important vitamins and minerals. High-fiber foods include whole-grain cereals and breads, oatmeal, beans, brown rice, citrus fruits, and apples.     · Eat lean proteins. Heart-healthy proteins include seafood, lean meats and poultry, eggs, beans, peas, nuts, seeds, and soy products.     · Limit drinks and foods with added sugar. These include candy, desserts, and soda pop.    Lifestyle changes    · If your doctor recommends it, get more exercise. Walking is a good choice. Bit by bit, increase the amount you walk every day. Try for at least 30 minutes on most days of the week. You also may want to swim, bike, or do other activities.     · Do not smoke. If you need help quitting, talk to your doctor about stop-smoking programs and medicines. These can increase your chances of quitting for good. Quitting smoking may be the most important step you can take to protect your heart. It is never too late to quit. You will get health benefits right away.     · Limit alcohol to 2 drinks a day for men and 1 drink a day for women. Too much alcohol can cause health problems. Medicines    · Take your medicines exactly as prescribed. Call your doctor if you think you are having a problem with your medicine.     · If your doctor recommends aspirin, take the amount directed each day. Make sure you take aspirin and not another kind of pain reliever, such as acetaminophen (Tylenol). If you take ibuprofen (such as Advil or Motrin) for other problems, take aspirin at least 2 hours before taking ibuprofen. When should you call for help?   Call 911 if you have symptoms of a heart

## 2020-06-03 RX ORDER — NEBIVOLOL HYDROCHLORIDE 5 MG/1
TABLET ORAL
Qty: 60 TABLET | Refills: 5 | Status: SHIPPED | OUTPATIENT
Start: 2020-06-03 | End: 2020-12-04

## 2020-06-05 RX ORDER — OLMESARTAN MEDOXOMIL 40 MG/1
TABLET ORAL
Qty: 30 TABLET | Refills: 5 | Status: SHIPPED | OUTPATIENT
Start: 2020-06-05 | End: 2020-08-10 | Stop reason: SDUPTHER

## 2020-07-17 ENCOUNTER — OFFICE VISIT (OUTPATIENT)
Dept: OTOLARYNGOLOGY | Facility: CLINIC | Age: 85
End: 2020-07-17

## 2020-07-17 VITALS
HEART RATE: 64 BPM | DIASTOLIC BLOOD PRESSURE: 82 MMHG | TEMPERATURE: 97 F | WEIGHT: 176 LBS | SYSTOLIC BLOOD PRESSURE: 156 MMHG | BODY MASS INDEX: 26.07 KG/M2 | HEIGHT: 69 IN

## 2020-07-17 DIAGNOSIS — H93.293 IMPAIRMENT OF AUDITORY DISCRIMINATION OF BOTH EARS: ICD-10-CM

## 2020-07-17 DIAGNOSIS — S02.92XD CLOSED FRACTURE OF FACE BONES DUE TO FALL WITH ROUTINE HEALING, SUBSEQUENT ENCOUNTER: ICD-10-CM
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
## 2020-07-17 DIAGNOSIS — Z97.4 PRESENCE OF EXTERNAL HEARING AID: ICD-10-CM

## 2020-07-17 DIAGNOSIS — H90.3 HEARING LOSS SENSORY, BILATERAL: ICD-10-CM

## 2020-07-17 DIAGNOSIS — H90.3 SNHL (SENSORY-NEURAL HEARING LOSS), ASYMMETRICAL: Primary | ICD-10-CM

## 2020-07-17 PROCEDURE — 99213 OFFICE O/P EST LOW 20 MIN: CPT | Performed by: OTOLARYNGOLOGY

## 2020-07-17 NOTE — PROGRESS NOTES
Guadalupe Lezama LPN   Patient Intake Note    Review of Systems  Review of Systems   Constitutional: Negative for appetite change, fatigue and fever.   HENT:        See HPI   Gastrointestinal: Negative for constipation, diarrhea, nausea and vomiting.   Skin: Negative for rash.   Neurological: Negative for dizziness, light-headedness and headaches.   Psychiatric/Behavioral: Negative for sleep disturbance.       Tobacco Use: Screening and Cessation Intervention  Social History    Tobacco Use      Smoking status: Former Smoker        Packs/day: 0.50        Years: 10.00        Pack years: 5        Types: Cigarettes        Quit date:         Years since quittin.5      Smokeless tobacco: Never Used      Tobacco comment: quit 40 years ago        Guadalupe Lezama LPN  2020  13:08

## 2020-07-17 NOTE — PATIENT INSTRUCTIONS
EAR CARE: :using oil  Use a hair dryer on low heat and blow into the ear canals 2 times daily to keep ears dry.  DO Not use Q tips or Eloina pins, ever!!  Do not use alcohol in the ear canal (this will cause dryness and itching)  NO peroxide or alcohol in ears  Oil: Use Sweet oil, Olive oil, or Mineral oil, purchased over the counter, once a week, Do not use Q tips, You may use a hair dryer on low heat. Blow in ears for 10-15 seconds 2 times daily to dry ear canals or if ear canals are itching.    NASAL SALINE:  Use 2 puffs each nostril 4-6 times daily and more frequently if possible.  You can buy saline spray or you can make your own and use an old spray bottle to administer  Use a humidifier at bedside  Recipe for saline:d  Water                                 1 quart  Salt (table)                        1 tablespoon  Gylcerin (or Hamida Syrup)    1 teaspoon  Sodium bicarbonate           1 teaspoon  Sprays or Merced pots are recommended

## 2020-07-17 NOTE — PROGRESS NOTES
Alex Rm Jr, MD     ENT FOLLOW UP NOTE     Chief Complaint   Patient presents with   • Follow-up     6 month fracture        HISTORY OF PRESENT ILLNESS:  Accompanied by:  No one  Angelia Munoz is a  87 y.o. female who is here for follow up. She is doing well. No issues with face.  Hearing is only OK.    Review of Systems  Reviewed per patient intake note and confirmed by me    Past History:  Past medical and surgical history, family history and social history reviewed and updated when appropriate.  Current medications and allergies reviewed and updated when appropriate.  Allergies:  Ciprofloxacin; Sulfa antibiotics; Tekturna [aliskiren]; and Codeine        Vital Signs:   Temp:  [97 °F (36.1 °C)] 97 °F (36.1 °C)  Heart Rate:  [64] 64  BP: (156)/(82) 156/82  EXAMINATION:  CONSTITUTIONAL:    well nourished, well-developed, alert, oriented, in no acute distress      BODY HABITUS:    Normal body habitus     COMMUNICATION:    able to communicate normally, normal voice quality     HEAD:     Normocephalic, without obvious abnormality, atraumatic     FACE:    structure normal, no tenderness present, no lesions/masses, no evidence of trauma     SALIVARY GLANDS:    parotid glands with no tenderness, no swelling, no masses, submandibular glands with normal size, nontender      EYE:    ocular motility normal, eyelids normal, orbits normal, no proptosis, conjunctiva clear, sclera non-icteric, pupils equal, round, reactive to light and accomodation  Color:   blue    Lateral canthus OS well healed without hooding     HEARING:      response to conversational voice decreased  Bilateral  Hearing aids present  Bilateral     EARS:    Otoscopic exam   normal pinna with no lesions, Canals normal size and shape, Tympanic membranes normal, Ossicular chain intact, Middle ear clear  Hearing aids AU     NOSE EXTERNAL:    APPEARANCE: normal, straight, with good projection, no tenderness, no lesions, no tenderness, good nasal support,  patent nares     NOSE INTERNAL:    Anterior rhinoscopy  intact mucosa, normal inferior turbinates, septum midline without perforation, secretions clear and normal amount, nares patent, normal nasal airway without obstruction, no lesions     ORAL CAVITY:    Normal lips with no lesions, dentition normal for age, FOM intact without lesions and normal salivary flow, Mucosa intact without lesions, Hard and soft palate normal without lesions  L upper sulcus well healed     OROPHARYNX:    Direct examination  oropharyngeal mucosa normal, tonsil(s) with normal appearance       NECK:    normal appearance, no masses, no lesions, larynx normal mobility, trachea midline     LYMPH NODES :    no adenopathy     THYROID:    no overt thyromegaly, no tenderness, nodules or mass present on palpation, position midline     CHEST/RESPIRATORY:    respiratory effort normal, no rales, rubs or wheezing, no stridor, normal appearance to chest     CARDIOVASCULAR:    regular rate and rhythm, no murmurs, gallups, no peripheral edema     NEURO/PSYCHIATRIC :    oriented appropriately for age, mood normal, affect appropriate, cranial nerves intact grossly (unless specifically described), gait normal for ago    RESULTS REVIEW:    I have reviewed the patients old records in the chart.           ASSESSMENT:      Diagnosis Plan   1. SNHL (sensory-neural hearing loss), asymmetrical     2. Closed fracture of face bones due to fall with routine healing, subsequent encounter     3. Hearing loss sensory, bilateral     4. Presence of external hearing aid     5. 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  Patient is doing well. Face is stable.  She is seeing hearing aid fitter for Hearing aids.  I will follow PRN.  Oral care  Hearing aids  MY CHART:  Patient is Patient is using My Chart          Patient understand(s) and agree(s) with the treatment plan as described.    Return if symptoms worsen  or fail to improve, for Recheck ears and face.     Alex Rm Jr, MD  07/17/20  13:24

## 2020-08-04 RX ORDER — HYDROCHLOROTHIAZIDE 12.5 MG/1
12.5 TABLET ORAL DAILY
Qty: 30 TABLET | Refills: 6 | Status: SHIPPED | OUTPATIENT
Start: 2020-08-04 | End: 2021-03-04

## 2020-08-10 RX ORDER — OLMESARTAN MEDOXOMIL 40 MG/1
TABLET ORAL
Qty: 90 TABLET | Refills: 3 | Status: SHIPPED | OUTPATIENT
Start: 2020-08-10 | End: 2021-08-31

## 2020-08-20 ENCOUNTER — OFFICE VISIT (OUTPATIENT)
Age: 85
End: 2020-08-20

## 2020-08-20 VITALS — TEMPERATURE: 97.3 F | HEART RATE: 63 BPM | OXYGEN SATURATION: 95 %

## 2020-08-21 LAB — SARS-COV-2, NAA: NOT DETECTED

## 2020-08-25 ENCOUNTER — OFFICE VISIT (OUTPATIENT)
Dept: NEUROLOGY | Facility: CLINIC | Age: 85
End: 2020-08-25

## 2020-08-25 VITALS
HEART RATE: 68 BPM | SYSTOLIC BLOOD PRESSURE: 130 MMHG | DIASTOLIC BLOOD PRESSURE: 72 MMHG | BODY MASS INDEX: 26.22 KG/M2 | RESPIRATION RATE: 18 BRPM | WEIGHT: 177 LBS | HEIGHT: 69 IN

## 2020-08-25 DIAGNOSIS — I63.9 CEREBROVASCULAR ACCIDENT (CVA), UNSPECIFIED MECHANISM (HCC): Primary | ICD-10-CM

## 2020-08-25 DIAGNOSIS — R09.02 HYPOXIA: ICD-10-CM

## 2020-08-25 PROCEDURE — 99213 OFFICE O/P EST LOW 20 MIN: CPT | Performed by: PSYCHIATRY & NEUROLOGY

## 2020-08-25 NOTE — PATIENT INSTRUCTIONS
Patient to continue driving precautions and safety precautions and get to emergency room immediately if stroke symptoms occur.

## 2020-08-25 NOTE — PROGRESS NOTES
Subjective   Angelia Munoz, 3/26/1933, is a female who is being seen today for   Chief Complaint   Patient presents with   • Cerebrovascular Accident       HISTORY OF PRESENT ILLNESS: Patient previously seen for CVA and hypoxia.  Patient has not had any further stroke symptoms.  No complaints of headache.  Patient is using her oxygen at night.  Patient has not had any further falls    REVIEW OF SYSTEMS:   GENERAL: Blood pressure today 130/72 left arm seated in same standing with pulse 68  PULMONARY: Patient follows with pulmonary with history of COPD  CVS: No acute chest pain or palpitation  GASTROINTESTINAL: No acute GI distress  GENITOURINARY: No acute  distress  GYN: No acute GYN distress  MUSCULOSKELETAL: No acute musculoskeletal symptoms  HEENT: Patient wears hearing aids  ENDOCRINE: No acute endocrine symptoms  PSYCHIATRIC: No acute psychiatric symptoms  HEMATOLOGY: Blood work followed by PCP  SKIN: No acute skin changes  Family history reviewed and otherwise noncontributory to this problem  Social history: Patient denies smoking or drug use.  Patient does use alcohol.    PHYSICAL EXAMINATION:    GENERAL: No acute distress  CRANIUM: Post craniotomy  HEENT:       EYES: No acute fundic abnormalities.  Pupils equal round reactive to light.  EOMs intact without nystagmus and fields full to confrontation.       EARS: Tympanic membranes obscured by wax bilaterally but hears tuning fork with hearing aids in       THROAT: No acute oropharynx abnormality        NECK: No bruits/no lymphadenopathy  CHEST: No acute cardiopulmonary abnormalities by auscultation  ABDOMEN: Nondistended  EXTREMITIES: Dorsalis pedis pulse symmetrical  NEURO: Patient alert and follows commands without difficulty  SPEECH: Normal    CRANIAL NERVES: Motor/sensory about the face normal and symmetric    MOTOR STRENGTH: Motor strength upper and lower extremities normal  STATION AND GAIT: Gait normal/Romberg negative  CEREBELLAR: Finger-nose and  heel-to-shin normal  SENSORY: Decreased pin and vibration distal proximal in the lower extremities to ankles bilaterally otherwise normal pin and vibration throughout  REFLEXES: Decreased ankle jerk bilaterally with present knee jerk and biceps without clonus or Babinski    ASSESSMENT AND PLAN: Patient with history of stroke and hypoxia stable at this point.  Patient to get to emergency room immediately if stroke symptoms occur.  Safety precautions reviewed.Patient's Body mass index is 26.14 kg/m². BMI is within normal parameters. No follow-up required..      Angelia was seen today for cerebrovascular accident.    Diagnoses and all orders for this visit:    Cerebrovascular accident (CVA), unspecified mechanism (CMS/HCC)    Hypoxia        Dictated utilizing Dragon voice recognition software

## 2020-11-03 PROBLEM — I63.9 CEREBROVASCULAR ACCIDENT (CVA) (HCC): Status: RESOLVED | Noted: 2020-11-03 | Resolved: 2020-11-03

## 2020-11-17 ENCOUNTER — OFFICE VISIT (OUTPATIENT)
Dept: CARDIOLOGY | Age: 85
End: 2020-11-17
Payer: MEDICARE

## 2020-11-17 VITALS
DIASTOLIC BLOOD PRESSURE: 70 MMHG | HEART RATE: 65 BPM | BODY MASS INDEX: 26.51 KG/M2 | WEIGHT: 179 LBS | SYSTOLIC BLOOD PRESSURE: 132 MMHG | HEIGHT: 69 IN

## 2020-11-17 PROCEDURE — 93000 ELECTROCARDIOGRAM COMPLETE: CPT | Performed by: INTERNAL MEDICINE

## 2020-11-17 PROCEDURE — 99213 OFFICE O/P EST LOW 20 MIN: CPT | Performed by: INTERNAL MEDICINE

## 2020-11-17 PROCEDURE — G8484 FLU IMMUNIZE NO ADMIN: HCPCS | Performed by: INTERNAL MEDICINE

## 2020-11-17 PROCEDURE — 1123F ACP DISCUSS/DSCN MKR DOCD: CPT | Performed by: INTERNAL MEDICINE

## 2020-11-17 PROCEDURE — 4040F PNEUMOC VAC/ADMIN/RCVD: CPT | Performed by: INTERNAL MEDICINE

## 2020-11-17 PROCEDURE — G8427 DOCREV CUR MEDS BY ELIG CLIN: HCPCS | Performed by: INTERNAL MEDICINE

## 2020-11-17 PROCEDURE — 1090F PRES/ABSN URINE INCON ASSESS: CPT | Performed by: INTERNAL MEDICINE

## 2020-11-17 PROCEDURE — 1036F TOBACCO NON-USER: CPT | Performed by: INTERNAL MEDICINE

## 2020-11-17 PROCEDURE — G8417 CALC BMI ABV UP PARAM F/U: HCPCS | Performed by: INTERNAL MEDICINE

## 2020-11-17 NOTE — PROGRESS NOTES
66-year-old lady with a history of dyslipidemia, past tobacco abuse, hypertension, COPD, paroxysmal supraventricular tachycardia, and prior CVA returns for routine follow-up visit. Sustained a CVA with good recovery in 2018 without source found. Echocardiogram revealed normal chamber sizes, preserved systolic function no evidence of mood intracardiac shunt and mild/grade 1 diastolic dysfunction. She has subsequently done well with her only complaint being that of very mild dyspnea on exertion consequent to remote tobacco abuse. She denies any significant change in exercise tolerance or chest discomfort suspicious for ischemia. She has been careful in her response to the pandemic but does have 5 children many grandchildren and 21 great-grandchildren, most who live locally. On exam she carries 179 pounds in a 5 foot 9 inch frame. Pressure is 132/70 pulse of 65. EOMs full, sclerae and conjunctiva normal. PERRLA. Mask in place. Trachea midline with no neck masses. Assessment of internal jugular veins reveals no elevation of central venous pressure at 45 degrees. Carotid pulses normal without delay or bruit. Thyroid normal to palpation. Chest exam reveals normal respiratory effort, no abnormal breath sounds and normal expiratory phase. No skin lesions seen. PMI normal.  Heart sounds are distant. Normal bowel sounds without palpable mass or bruit. No clubbing or acrocyanosis. No significant lower extremity edema or signs of venous insufficiency. General motor strength appears to be within normal limits. Normal range of motion with normal gait. Alert, oriented x 3, memory and cognition normal as reflected by history and conversation. EKG reveals a sinus rhythm without abnormalities. Assessment/plan:  1. Hypertension -well-controlled on present regimen. Continue same  2. Dyslipidemia -monitored and managed by Dr. Jeffers Diss  3. SVT -no recent symptomatic episodes   4.   Cerebrovascular disease -no apparent

## 2020-12-04 RX ORDER — NEBIVOLOL HYDROCHLORIDE 5 MG/1
TABLET ORAL
Qty: 60 TABLET | Refills: 5 | Status: SHIPPED | OUTPATIENT
Start: 2020-12-04 | End: 2021-06-03

## 2021-01-11 ENCOUNTER — OFFICE VISIT (OUTPATIENT)
Dept: PULMONOLOGY | Facility: CLINIC | Age: 86
End: 2021-01-11

## 2021-01-11 VITALS
HEART RATE: 68 BPM | RESPIRATION RATE: 16 BRPM | DIASTOLIC BLOOD PRESSURE: 72 MMHG | OXYGEN SATURATION: 98 % | BODY MASS INDEX: 26.07 KG/M2 | SYSTOLIC BLOOD PRESSURE: 116 MMHG | WEIGHT: 176 LBS | HEIGHT: 69 IN | TEMPERATURE: 96.8 F

## 2021-01-11 DIAGNOSIS — Z87.891 FORMER SMOKER: ICD-10-CM

## 2021-01-11 DIAGNOSIS — J41.0 SIMPLE CHRONIC BRONCHITIS (HCC): ICD-10-CM

## 2021-01-11 DIAGNOSIS — J44.9 STAGE 2 MODERATE COPD BY GOLD CLASSIFICATION (HCC): Primary | ICD-10-CM

## 2021-01-11 DIAGNOSIS — G47.34 NOCTURNAL HYPOXEMIA: ICD-10-CM

## 2021-01-11 DIAGNOSIS — J30.89 NON-SEASONAL ALLERGIC RHINITIS, UNSPECIFIED TRIGGER: ICD-10-CM

## 2021-01-11 DIAGNOSIS — R06.02 SHORTNESS OF BREATH: ICD-10-CM

## 2021-01-11 PROCEDURE — 99214 OFFICE O/P EST MOD 30 MIN: CPT | Performed by: INTERNAL MEDICINE

## 2021-01-11 RX ORDER — ALBUTEROL SULFATE 90 UG/1
2 AEROSOL, METERED RESPIRATORY (INHALATION) EVERY 4 HOURS PRN
Qty: 6.7 G | Refills: 5 | Status: SHIPPED | OUTPATIENT
Start: 2021-01-11

## 2021-01-11 RX ORDER — LORATADINE 10 MG/1
10 TABLET ORAL DAILY
Qty: 90 TABLET | Refills: 3 | Status: SHIPPED | OUTPATIENT
Start: 2021-01-11 | End: 2021-08-26

## 2021-01-11 RX ORDER — FLUTICASONE PROPIONATE 50 MCG
2 SPRAY, SUSPENSION (ML) NASAL DAILY
Qty: 1 BOTTLE | Refills: 5 | Status: SHIPPED | OUTPATIENT
Start: 2021-01-11 | End: 2022-01-19 | Stop reason: ALTCHOICE

## 2021-01-11 RX ORDER — TIOTROPIUM BROMIDE AND OLODATEROL 3.124; 2.736 UG/1; UG/1
2 SPRAY, METERED RESPIRATORY (INHALATION)
Qty: 2 INHALER | Refills: 5 | Status: SHIPPED | OUTPATIENT
Start: 2021-01-11 | End: 2021-02-10

## 2021-01-11 NOTE — PROGRESS NOTES
RESPIRATORY DISEASE CLINIC OUTPATIENT PROGRESS NOTE    Patient: Angelia Munoz  : 3/26/1933  Age: 87 y.o.  Date of Service: 2021    REASON FOR CLINIC VISIT:  Chief Complaint   Patient presents with   • Shortness of Breath       Subjective:    History of Present Illness:  Angelia Munoz is a 87 y.o. female who presents to the office today to be seen for    Diagnosis Plan   1. 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 Simple chronic bronchitis (CMS/HCC)     3. Shortness of breath     4. Former smoker     5. Nocturnal hypoxemia     6. Non-seasonal allergic rhinitis, unspecified trigger     .  Other problems per record.    History:    Patient is a very pleasant 87 years old  female who was seen in the follow-up visit in pulmonary clinic today.  She was seen by Dr. Mobley in the pulmonary clinic in the past.    She is a former smoker and quit smoking 40 years ago.  She has moderate chronic obstructive pulmonary disease and does not use any inhalers at this time.  She is reporting some shortness of breath particular on exertion.  She is on home oxygen and uses 2 L oxygen at night.  She had a fall with facial injury and had several surgeries done.  She has some injuries in the orbit and around the sinuses and since the fall she is having increasing sinus drainage and also has occasional cough with clear expectoration.    She did not have any recent hospitalizations any ER visit or any urgent care visit.  She denied any exposure to any sick contact or any exposure to Covid patient.  She lives independently.  Her   5 years ago she has several grownup children grandchildren and great-grandchildren.  She is doing very well for her age and is fairly active.  Already had her influenza vaccine.    PFT done today:  Not done today    PFT Values        Some values may be hidden. Unless noted otherwise, only the newest values recorded on  each date are displayed.         Old Values PFT Results 3/27/19 6/26/19   FVC 61% 67%   FEV1 50% 60%   FEV1/FVC 61.17% 66.35%      Pre Drug PFT Results 3/27/19 6/26/19   No data to display.      Post Drug PFT Results 3/27/19 6/26/19   No data to display.      Other Tests PFT Results 3/27/19 6/26/19   No data to display.           Results for orders placed in visit on 19   Pulmonary Function Test   Results for orders placed in visit on 19   Pulmonary Function Test          Bronchodilator therapy: None    Smoking Status:   Social History     Tobacco Use   Smoking Status Former Smoker   • Packs/day: 0.50   • Years: 10.00   • Pack years: 5.00   • Types: Cigarettes   • Quit date:    • Years since quittin.0   Smokeless Tobacco Never Used   Tobacco Comment    quit 40 years ago     Pulm Rehab: no  Sleep: yes    Support System: lives alone, her   a few years ago.    Code Status:   There are no questions and answers to display.        Review of Systems:  A complete review of systems is performed and all other systems were reviewed and negative as note above in the HPI.  Review of Systems   Constitutional: Negative.  Negative for unexpected weight gain.   HENT: Positive for congestion, postnasal drip, rhinorrhea and sinus pressure. Negative for nosebleeds.    Eyes: Negative.    Respiratory: Positive for cough, choking, chest tightness and shortness of breath.    Cardiovascular: Negative for chest pain, palpitations and leg swelling.   Gastrointestinal: Negative.    Endocrine: Negative.    Musculoskeletal: Positive for back pain.   Allergic/Immunologic: Negative.    Neurological: Negative.    Hematological: Negative.    Psychiatric/Behavioral: Negative.        CAT/ACT Score:  Not done today    Medications:  Outpatient Encounter Medications as of 2021   Medication Sig Dispense Refill   • allopurinol (ZYLOPRIM) 100 MG tablet Take 100 mg by mouth Daily.     • amLODIPine (NORVASC) 5 MG tablet  "Take 5 mg by mouth Daily.     • aspirin 325 MG tablet Take 325 mg by mouth Daily.     • hydrochlorothiazide (MICROZIDE) 12.5 MG capsule Take 12.5 mg by mouth Daily.  1   • nebivolol (BYSTOLIC) 10 MG tablet Take 5 mg by mouth 2 (Two) Times a Day.     • O2 (OXYGEN) Inhale 2 L/min Every Night.     • olmesartan (BENICAR) 40 MG tablet Take 40 mg by mouth Daily.  4   • albuterol sulfate  (90 Base) MCG/ACT inhaler Inhale 2 puffs Every 4 (Four) Hours As Needed for Wheezing. 6.7 g 5   • tiotropium bromide-olodaterol (Stiolto Respimat) 2.5-2.5 MCG/ACT aerosol solution inhaler Inhale 2 puffs Daily for 30 days. 2 inhaler 5     No facility-administered encounter medications on file as of 1/11/2021.        Allergies:  Allergies   Allergen Reactions   • Ciprofloxacin Nausea Only   • Sulfa Antibiotics Rash          • Tekturna [Aliskiren] Other (See Comments)     headache   • Codeine Nausea Only     Not really sure what it did to her       Immunizations:  Immunization History   Administered Date(s) Administered   • FLUAD TRI 65YR+ 11/15/2019   • Flu Vaccine Quad PF >18YRS 11/09/2016, 01/18/2018   • Fluzone High Dose =>65 Years ([employer] ONLY) 10/01/2020   • INFLUENZA SPLIT TRI 10/21/2015   • Pneumococcal Conjugate 13-Valent (PCV13) 06/21/2018   • Pneumococcal, Unspecified 01/05/2010       Objective:    Vitals:  /72   Pulse 68   Temp 96.8 °F (36 °C)   Resp 16   Ht 175.3 cm (69\")   Wt 79.8 kg (176 lb)   SpO2 98% Comment: RA  Breastfeeding No   BMI 25.99 kg/m²     Physical Exam:  General: Patient is a 87 y.o. pleasant   female. Looks stated age. Appears to be in no acute distress.  Eyes: EOMI. PERRLA. Vision intact. No scleral icterus.  Ear, Nose, Mouth and Throat: Hearing is grossly intact. No Leukoplakia, pharyngitis, stomatitis or thrush. Swollen nasal mucosa with post nasal drop.  Neck: Range of motion of neck normal. No thyromegaly or masses. Mallampati Class 3, No JVD  Respiratory: Clear to " auscultation bilaterally. No use of accessory muscles. Decreased breath sounds.  Cardiovascular: Normal heart sounds. Regularly regular rhythm without murmur.  Gastrointestinal: Non tender, non distended, soft. Bowel sounds positive in all four quadrants. No organomegaly.  Skin: No obvious rashes, lesions, ulcers or large amount of bruising. No edema.   Neurological: No new motor deficits. Cranial nerves appear intact.  Psychiatric: Patient is alert and oriented to person, place and time.    Chest Imaging:    Assessment:  1. Stage 2 moderate COPD by GOLD classification (CMS/HCC)    2. Simple chronic bronchitis (CMS/HCC)    3. Shortness of breath    4. Former smoker    5. Nocturnal hypoxemia    6. Non-seasonal allergic rhinitis, unspecified trigger        Plan/Recommendations:    1.  Patient has moderate to severe chronic obstructive pulmonary disease noted in the last pulmonary function test and she is not on any regular medications.  I advised her to start on Stiolto Respimat and albuterol rescue inhaler and prescription was sent to the pharmacy.  2.  She has allergy problems and she is not on any allergy medications.  Her allergy problems are in fact worse since her last fall and facial injury in 2019.  I advised her to start on fluticasone nasal spray and loratadine and prescription was given and sent to the pharmacy.  3.  Patient did not have any recent pulmonary function test or chest x-ray done and it will be ordered before next clinic visit in a year time.  4.  Continue all other treatment and follow-up with primary care provider.  She already had influenza vaccine.  Return to pulmonary clinic in a year time or earlier if needed.    Follow up:  12 Months    Time Spent:  30 minutes    I appreciate the opportunity of participating in this patient's care. I would like to thank the PCP for the referral.  Please feel free to contact me with any other questions.    Ivan Gupta MD   Pulmonologist/Intensivist      Electronically signed by: Ivan Gupta MD, 1/11/2021 16:46 CST

## 2021-01-16 ENCOUNTER — IMMUNIZATION (OUTPATIENT)
Dept: VACCINE CLINIC | Facility: HOSPITAL | Age: 86
End: 2021-01-16

## 2021-01-16 PROCEDURE — 91301 HC SARSCO02 VAC 100MCG/0.5ML IM: CPT | Performed by: OBSTETRICS & GYNECOLOGY

## 2021-01-16 PROCEDURE — 0011A: CPT | Performed by: OBSTETRICS & GYNECOLOGY

## 2021-02-13 ENCOUNTER — IMMUNIZATION (OUTPATIENT)
Dept: VACCINE CLINIC | Facility: HOSPITAL | Age: 86
End: 2021-02-13

## 2021-02-13 PROCEDURE — 0012A: CPT | Performed by: OBSTETRICS & GYNECOLOGY

## 2021-02-13 PROCEDURE — 91301 HC SARSCO02 VAC 100MCG/0.5ML IM: CPT | Performed by: OBSTETRICS & GYNECOLOGY

## 2021-03-04 DIAGNOSIS — I10 ESSENTIAL HYPERTENSION: ICD-10-CM

## 2021-03-04 RX ORDER — HYDROCHLOROTHIAZIDE 12.5 MG/1
CAPSULE, GELATIN COATED ORAL
Qty: 30 CAPSULE | Refills: 2 | Status: SHIPPED | OUTPATIENT
Start: 2021-03-04 | End: 2021-06-03

## 2021-05-17 ENCOUNTER — OFFICE VISIT (OUTPATIENT)
Dept: CARDIOLOGY CLINIC | Age: 86
End: 2021-05-17
Payer: MEDICARE

## 2021-05-17 VITALS
OXYGEN SATURATION: 98 % | DIASTOLIC BLOOD PRESSURE: 70 MMHG | WEIGHT: 175 LBS | HEART RATE: 70 BPM | SYSTOLIC BLOOD PRESSURE: 136 MMHG | BODY MASS INDEX: 25.92 KG/M2 | HEIGHT: 69 IN

## 2021-05-17 DIAGNOSIS — I47.1 PSVT (PAROXYSMAL SUPRAVENTRICULAR TACHYCARDIA) (HCC): ICD-10-CM

## 2021-05-17 DIAGNOSIS — I51.89 GRADE I DIASTOLIC DYSFUNCTION: ICD-10-CM

## 2021-05-17 DIAGNOSIS — I10 ESSENTIAL HYPERTENSION: ICD-10-CM

## 2021-05-17 DIAGNOSIS — R06.02 SOB (SHORTNESS OF BREATH): Primary | ICD-10-CM

## 2021-05-17 DIAGNOSIS — Z82.49 FAMILY HISTORY OF CORONARY ARTERY DISEASE IN FATHER: ICD-10-CM

## 2021-05-17 PROCEDURE — 1090F PRES/ABSN URINE INCON ASSESS: CPT | Performed by: NURSE PRACTITIONER

## 2021-05-17 PROCEDURE — 1123F ACP DISCUSS/DSCN MKR DOCD: CPT | Performed by: NURSE PRACTITIONER

## 2021-05-17 PROCEDURE — G8427 DOCREV CUR MEDS BY ELIG CLIN: HCPCS | Performed by: NURSE PRACTITIONER

## 2021-05-17 PROCEDURE — G8417 CALC BMI ABV UP PARAM F/U: HCPCS | Performed by: NURSE PRACTITIONER

## 2021-05-17 PROCEDURE — 1036F TOBACCO NON-USER: CPT | Performed by: NURSE PRACTITIONER

## 2021-05-17 PROCEDURE — 4040F PNEUMOC VAC/ADMIN/RCVD: CPT | Performed by: NURSE PRACTITIONER

## 2021-05-17 PROCEDURE — 99214 OFFICE O/P EST MOD 30 MIN: CPT | Performed by: NURSE PRACTITIONER

## 2021-05-17 NOTE — PATIENT INSTRUCTIONS
New instructions for today:    [facility] [address] located on the first floor of [place] [address] through hospital main entrance and turn immediately to your left. Patient contact number:  [phone] (home)      Date/Arrival Time:      Stress Echocardiogram      This records the heart's activity during a cardiac stress test.  A stress echocardiogram is a very effective, noninvasive test that can help determine whether you have blockages in your coronary arteries. The exam takes approximately thirty minutes. To help ensure accurate results, patients should take the following steps in preparation for a stress echocardiogram:   Refrain from strenuous activity for 12 hours before the test.   Do not eat, drink, or smoke for two hours prior to the test.  Unless instructed otherwise by your physician, you should continue to take prescribed medications. Wear loose, comfortable clothing and walking shoes. If you need to change this appointment, please call [phone] to reschedule. [facility] at [facility] [address] located on the first floor of [place] [address] through [facility] entrance and turn immediately to your left. Date/Time:     Patient's contact number:  [phone] (home)     Echocardiogram -  No prep. Takes approximately 30 min. An echocardiogram uses sound waves to produce images of your heart. This commonly used test allows your doctor to see how your heart is beating and pumping blood. Your doctor can use the images from an echocardiogram to identify various abnormalities in the heart muscle and valves. This test has 2 parts:   Ø You will be asked to disrobe from the waist up and given a gown to wear.  The technologist will then hook up an EKG monitor to you for the entire exam.   Ø You will then have an ultrasound of your heart (echocardiogram) to assess the heart muscle, heart valves and heart function. You may eat and take any medicines before the exam.     If you need to change your appointment, please call outpatient scheduling at [phone]. Patient Instructions:  Continue current medications as prescribed. Always keep a current medication list. Bring your medications to every office visit. Continue to follow up with primary care provider for non cardiac medical problems. Call the office with any problems, questions or concerns at [phone]. If you have been asked to keep a blood pressure log, do so for 2 weeks. Call the office to report readings to the triage nurse at [phone]. Follow up with cardiologist as scheduled. The following educational material has been included in this after visit summary for your review: Life LeadFire 7. Heart health. Life simple 7  1) Manage blood pressure - high blood pressure is a major risk factor for heart disease and stroke. Keeping blood pressure in health range reduces strain on your heart, arteries and kidneys. Blood pressure goal is less than 130/80. 2) Control cholesterol - contributes to plaque, which can clog arteries and lead to heart disease and stroke. When you control your cholesterol you are giving your arteries their best chance to remain clear. It is recommended that you get cholesterol lab work done once a year. 3) Reduce blood sugar - most of the food we eat is turning into glucose or blood sugar that our body uses for energy. Over time, high levels of blood sugar can damage your heart, kidneys, eyes and nerves. 4) Get active - living an active life is one of the most rewarding gifts you can give yourself and those you love. Simply put, daily physical activity increases your length and quality of life. Strive to exercise 15 minutes most days of the week. 5)  Eat better - A healthy diet is one of your best weapons for fighting cardiovascular disease.   When you eat a heart healthy diet, you improve your chances for feeling good and staying healthy for life. 6)  Lose weight - when you shed extra fat an unnecessary pounds, you reduce the burden on your hear, lungs, blood vessels and skeleton. You give yourself the gift of active living, you lower your blood pressure and help yourself feel better. 7) Stop smoking - cigarette smokers have a higher risk of developing cardiovascular disease. If  You smoke, quitting is the best thing you can do for your health. Check American Heart Association on line for more information on Life's Simple 7 and tips for healthy living. A Healthy Heart: Care Instructions  Your Care Instructions     Coronary artery disease, also called heart disease, occurs when a substance called plaque builds up in the vessels that supply oxygen-rich blood to your heart muscle. This can narrow the blood vessels and reduce blood flow. A heart attack happens when blood flow is completely blocked. A high-fat diet, smoking, and other factors increase the risk of heart disease. Your doctor has found that you have a chance of having heart disease. You can do lots of things to keep your heart healthy. It may not be easy, but you can change your diet, exercise more, and quit smoking. These steps really work to lower your chance of heart disease. Follow-up care is a key part of your treatment and safety. Be sure to make and go to all appointments, and call your doctor if you are having problems. It's also a good idea to know your test results and keep a list of the medicines you take. How can you care for yourself at home? Diet  · Use less salt when you cook and eat. This helps lower your blood pressure. Taste food before salting. Add only a little salt when you think you need it. With time, your taste buds will adjust to less salt. · Eat fewer snack items, fast foods, canned soups, and other high-salt, high-fat, processed foods. · Read food labels and try to avoid saturated and trans fats.  They increase your risk of heart disease by raising cholesterol levels. · Limit the amount of solid fat-butter, margarine, and shortening-you eat. Use olive, peanut, or canola oil when you cook. Bake, broil, and steam foods instead of frying them. · Eat a variety of fruit and vegetables every day. Dark green, deep orange, red, or yellow fruits and vegetables are especially good for you. Examples include spinach, carrots, peaches, and berries. · Foods high in fiber can reduce your cholesterol and provide important vitamins and minerals. High-fiber foods include whole-grain cereals and breads, oatmeal, beans, brown rice, citrus fruits, and apples. · Eat lean proteins. Heart-healthy proteins include seafood, lean meats and poultry, eggs, beans, peas, nuts, seeds, and soy products. · Limit drinks and foods with added sugar. These include candy, desserts, and soda pop. Lifestyle changes  · If your doctor recommends it, get more exercise. Walking is a good choice. Bit by bit, increase the amount you walk every day. Try for at least 30 minutes on most days of the week. You also may want to swim, bike, or do other activities. · Do not smoke. If you need help quitting, talk to your doctor about stop-smoking programs and medicines. These can increase your chances of quitting for good. Quitting smoking may be the most important step you can take to protect your heart. It is never too late to quit. · Limit alcohol to 2 drinks a day for men and 1 drink a day for women. Too much alcohol can cause health problems. · Manage other health problems such as diabetes, high blood pressure, and high cholesterol. If you think you may have a problem with alcohol or drug use, talk to your doctor. Medicines  · Take your medicines exactly as prescribed. Call your doctor if you think you are having a problem with your medicine. · If your doctor recommends aspirin, take the amount directed each day.  Make sure you take aspirin and not another kind of pain reliever, such as acetaminophen (Tylenol). When should you call for help? [id number] if you have symptoms of a heart attack. These may include:  · Chest pain or pressure, or a strange feeling in the chest.  · Sweating. · Shortness of breath. · Pain, pressure, or a strange feeling in the back, neck, jaw, or upper belly or in one or both shoulders or arms. · Lightheadedness or sudden weakness. · A fast or irregular heartbeat. After you call 911, the  may tell you to chew 1 adult-strength or 2 to 4 low-dose aspirin. Wait for an ambulance. Do not try to drive yourself. Watch closely for changes in your health, and be sure to contact your doctor if you have any problems. Where can you learn more? Go to https://Z[employer]gedai.com.[employer]. org and sign in to your One Touch EMR account. Enter P300 in the [employer] box to learn more about \"A Healthy Heart: Care Instructions. \"     If you do not have an account, please click on the \"Sign Up Now\" link. Current as of: December 16, 2019               Content Version: 12.5  © 0538-9489 Healthwise, Incorporated. Care instructions adapted under license by [employer] [facility] ([facility]). If you have questions about a medical condition or this instruction, always ask your healthcare professional. [address]  any warranty or liability for your use of this information.

## 2021-05-17 NOTE — PROGRESS NOTES
cerebrovascular accident (CVA) I69.320    Acute cystitis without hematuria N30.00    COPD (chronic obstructive pulmonary disease) ([facility]) J44.9    History of CVA (cerebrovascular accident) Z80.78    PSVT (paroxysmal supraventricular tachycardia) ([facility]) I47.1    Leg fatigue R29.898    Grade I diastolic dysfunction H54.9     Past Medical History:   Diagnosis Date    Arthralgia     Brain tumor ([facility]     meningioma removed 2009    COPD [facility]ronic obstructive [facility] disease) 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     Past Surgical History:   Procedure Laterality Date    BREAST BIOPSY  1992&2004    CATARACT REMOVAL      CHOLECYSTECTOMY  1996    CRANIOTOMY  2010    DIAGNOSTIC CARDIAC CATH LAB PROCEDURE      FACIAL RECONSTRUCTION SURGERY      HYSTERECTOMY  1971    TUMOR REMOVAL  2009    brain tumor (meningioma)     Family History   Problem Relation Age of Onset    Coronary Art Dis Father     Heart Attack Father     Cancer Sister     Heart Attack Mother     Stroke Mother     Cancer Sister     Cancer Sister      Social History     Tobacco Use    Smoking status: Former Smoker     Packs/day: 1.00     Years: 8.00     Pack years: 8.00    Smokeless tobacco: Never Used    Tobacco comment: Quit 40+ years ago. Substance Use Topics    Alcohol use: No     Comment: occasional      Current Outpatient Medications   Medication Sig Dispense Refill    hydroCHLOROthiazide (MICROZIDE) 12.5 MG capsule TAKE 1 CAPSULE BY MOUTH DAILY 30 capsule 2    BYSTOLIC 5 MG tablet TAKE 1 TABLET BY MOUTH TWICE DAILY. 60 tablet 5    olmesartan (BENICAR) 40 MG tablet TAKE 1 TABLET BY MOUTH DAILY.  90 tablet 3    amLODIPine (NORVASC) 5 MG tablet Take 1 tablet by mouth daily 90 tablet 3    OXYGEN Inhale 2 L into the lungs nightly      cloNIDine (CATAPRES) 0.1 MG tablet Take 1 tablet by mouth 2 times daily as needed for High Blood Pressure (for BP > 160/90) 60 tablet 3    aspirin 325 MG tablet Take 1 tablet by mouth daily 30 tablet 3    allopurinol (ZYLOPRIM) 100 MG tablet Take 100 mg by mouth daily. No current facility-administered medications for this visit. Allergies: Aliskiren, Amlodipine, Ciprofibrate, Ciprofloxacin, Codeine, and Sulfa antibiotics    Review of Systems  Constitutional  no appetite change, or unexpected weight change. No fever, chills or diaphoresis. No significant change in activity level or new onset of fatigue. HEENT  no significant rhinorrhea or epistaxis. No tinnitus or significant hearing loss. Eyes  no sudden vision change or amaurosis. No corneal arcus, xantholasma, subconjunctival hemorrhage or discharge. Respiratory  no significant wheezing, stridor, apnea or cough. + worsening FARNS[place]TH. Cardiovascular  no exertional chest pain to suggest myocardial ischemia. No orthopnea or PND. No sensation of sustained arrythmia. No occurrence of slow heart rate. No palpitations. No claudication. Gastrointestinal  no abdominal swelling or pain. No blood in stool. No severe constipation, diarrhea, nausea, or vomiting. Genitourinary  no dysuria, frequency, or urgency. No flank pain or hematuria. Musculoskeletal  no back pain or myalgia. No problems with gait. Extremities - no clubbing, cyanosis or extremity edema. Skin  no color change or rash. No pallor. No new surgical incision. Neurologic  no speech difficulty, facial asymmetry or lateralizing weakness. No seizures, presyncope or syncope. No significant dizziness. Hematologic  no easy bruising or excessive bleeding. Psychiatric  no severe anxiety or insomnia. No confusion. All other review of systems are negative.     Objective  Vital Signs - /70   Pulse 70   Ht 5' 9\" (1.753 m)   Wt 175 lb (79.4 kg)   SpO2 98%   BMI 25.84 kg/m² Lucille Johnson is alert, cooperative, and pleasant. Well groomed. No acute distress. Body habitus - Body mass index is 25.84 kg/m². HEENT  Head is normocephalic. No circumoral cyanosis. Dentition is normal.  EYES -   Lids normal without ptosis. No discharge, edema or subconjunctival hemorrhage. Neck - Symmetrical without apparent mass or lymphadenopathy. Respiratory - Normal respiratory effort without use of accessory muscles. Ausculatation reveals vesicular breath sounds without crackles, wheezes, rub or rhonchi. Cardiovascular  No jugular venous distention. Auscultation reveals regular rate and rhythm. No audible clicks, gallop or rub. No murmur. No lower extremity varicosities. No carotid bruits. Abdominal -  No visible distention, mass or pulsations. Extremities - No clubbing or cyanosis. No statis dermatitis or ulcers. No edema. Musculoskeletal -   No Osler's nodes. No kyphosis or scoliosis. Gait is even and regular without limp or shuffle. Ambulates without assistance. Skin -  Warm and dry; no rash or pallor. No new surgical wound. Neurological - No focal neurological deficits. Thought processes coherent. No apparent tremor. Oriented to person, place and time. Psychiatric -  Appropriate affect and mood. Data reviewed:  2/13/18 echo  Summary   Normal left ventricular size with preserved LV function and an estimated   ejection fraction of approximately 55-60%. No regional wall motion abnormalities identified. E/A flow reversal pattern consistent with Grade I diastolic dysfunction. No evidence of left ventricular mass or thrombus noted. Aortic root is within normal limits. Agitated saline was injected and showed no evidence of intracardiac shunt   with or without valsalva maneuver. Signature   Electronically signed by Emily Rendon MD(Interpreting   physician) on 02/13/2018 09:20 AM    1/4/16 SE  1. The resting EKG reveals normal sinus rhythm.    2. Continue to follow up with primary care provider for non cardiac medical problems. If your primary care provider is outside of the [facility], please request that your labs be faxed to this office at [phone]. BP goal 130/80 or less. Call the office with any problems, questions or concerns at [phone]. Cardiology follow up as scheduled in [address] appointments. Dr. Amish Erwin as scheduled. Educational included in patient instructions. Heart health.       Vilma Chawla, APRN

## 2021-05-19 ENCOUNTER — TELEPHONE (OUTPATIENT)
Dept: CARDIOLOGY CLINIC | Age: 86
End: 2021-05-19

## 2021-05-19 ENCOUNTER — HOSPITAL ENCOUNTER (OUTPATIENT)
Dept: NON INVASIVE DIAGNOSTICS | Age: 86
Discharge: HOME | End: 2021-05-19
Payer: MEDICARE

## 2021-05-19 DIAGNOSIS — R06.02 SOB (SHORTNESS OF BREATH): ICD-10-CM

## 2021-05-19 DIAGNOSIS — I10 ESSENTIAL HYPERTENSION: ICD-10-CM

## 2021-05-19 LAB
LV EF: 50 %
LV EF: 63 %
LVEF MODALITY: NORMAL
LVEF MODALITY: NORMAL

## 2021-05-19 PROCEDURE — 93306 TTE W/DOPPLER COMPLETE: CPT

## 2021-05-19 PROCEDURE — 93350 STRESS TTE ONLY: CPT

## 2021-06-03 DIAGNOSIS — I10 ESSENTIAL HYPERTENSION: ICD-10-CM

## 2021-06-03 RX ORDER — NEBIVOLOL HYDROCHLORIDE 5 MG/1
TABLET ORAL
Qty: 60 TABLET | Refills: 5 | Status: SHIPPED | OUTPATIENT
Start: 2021-06-03 | End: 2021-12-07

## 2021-06-03 RX ORDER — HYDROCHLOROTHIAZIDE 12.5 MG/1
CAPSULE, GELATIN COATED ORAL
Qty: 30 CAPSULE | Refills: 5 | Status: SHIPPED | OUTPATIENT
Start: 2021-06-03 | End: 2021-12-08

## 2021-07-07 RX ORDER — AMLODIPINE BESYLATE 5 MG/1
TABLET ORAL
Qty: 90 TABLET | Refills: 3 | Status: SHIPPED | OUTPATIENT
Start: 2021-07-07 | End: 2022-04-12

## 2021-08-26 ENCOUNTER — OFFICE VISIT (OUTPATIENT)
Dept: NEUROLOGY | Facility: CLINIC | Age: 86
End: 2021-08-26

## 2021-08-26 VITALS
RESPIRATION RATE: 18 BRPM | BODY MASS INDEX: 25.92 KG/M2 | HEIGHT: 69 IN | WEIGHT: 175 LBS | SYSTOLIC BLOOD PRESSURE: 128 MMHG | HEART RATE: 68 BPM | DIASTOLIC BLOOD PRESSURE: 84 MMHG

## 2021-08-26 DIAGNOSIS — R09.02 HYPOXIA: ICD-10-CM

## 2021-08-26 DIAGNOSIS — I63.9 CEREBROVASCULAR ACCIDENT (CVA), UNSPECIFIED MECHANISM (HCC): Primary | ICD-10-CM

## 2021-08-26 PROCEDURE — 99213 OFFICE O/P EST LOW 20 MIN: CPT | Performed by: PSYCHIATRY & NEUROLOGY

## 2021-08-26 NOTE — PATIENT INSTRUCTIONS
Patient to continue driving precautions and safety precautions and fall precautions as discussed.  Patient to get to emergency room immediately if stroke symptoms occur

## 2021-08-26 NOTE — PROGRESS NOTES
Subjective   Angelia Munoz, 3/26/1933, is a female who is being seen today for   Chief Complaint   Patient presents with   • Stroke       HISTORY OF PRESENT ILLNESS: Patient seen for history of stroke stable without any stroke symptoms.  Patient continues on aspirin 325 mg.  Statin discontinued per PCP.  Patient has history of hypoxia on oxygen at night.  [place] Sleepiness Scale is 2.  Patient has not had any falls since last visit.  Patient denies any headaches.  Patient has history of meningioma with right frontal craniotomy greater than 10 years ago and no evidence of recurrence on CT head scan 2019.  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    REVIEW OF SYSTEMS:   GENERAL: Blood pressure today 122/82 left arm seated 128/84 left arm standing with pulse 68  PULMONARY: As above  CVS: No acute chest pain or palpitation  GASTROINTESTINAL: No acute GI distress  GENITOURINARY: No acute  distress  GYN: No acute GYN distress  MUSCULOSKELETAL: No acute musculoskeletal symptoms  HEENT: Patient wears hearing aids  ENDOCRINE: No acute endocrine symptoms  PSYCHIATRIC: No acute psychiatric symptoms  HEMATOLOGY: Patient blood work followed by PCP  SKIN: No acute skin changes  Family history reviewed and otherwise noncontributory to this problem  Social history: Patient denies smoking or drug use.  Patient does use alcohol      PHYSICAL EXAMINATION:    GENERAL: No acute distress  CRANIUM: Right frontal craniotomy  HEENT:       EYES: EOMs intact without nystagmus and fields full to confrontation.  Pupils equal round reactive to light.  No acute fundic abnormalities.       EARS: Tympanic membranes normal and hears tuning fork bilaterally with hearing aids in       THROAT: No acute oropharynx abnormalities       NECK: No bruits/no lymphadenopathy  CHEST: No acute cardiopulmonary abnormalities by auscultation  ABDOMEN: Nondistended  EXTREMITIES: Dorsalis pedis pulses symmetrical  NEURO: Patient alert and follows commands without  difficulty  SPEECH: Normal    CRANIAL NERVES: Motor/sensory about the face normal and symmetric    MOTOR STRENGTH: Normal motor strength upper and lower extremities.  STATION AND GAIT: Gait normal/Romberg negative  CEREBELLAR: Finger-nose and heel-to-shin normal  SENSORY: Decreased pin and vibration distal approximately lower extremities to the ankles bilaterally otherwise normal pin and vibration throughout  REFLEXES: Decreased ankle jerk versus knee jerks and slight decrease in right knee jerk versus left.  Biceps symmetric without clonus or Babinski      ASSESSMENT AND PLAN: Patient with history of CVA stable without further stroke symptoms and no falls.  Patient continues oxygen.  Continue safety and driving precautions as well as fall precautions.  Patient to get to emergency room immediately if stroke symptoms occur.  I spent 20 minutes with this patient with counseling and review of records and exam.  Patient BMI in normal range.      Diagnoses and all orders for this visit:    1. Cerebrovascular accident (CVA), unspecified mechanism (CMS/HCC) (Primary)    2. Hypoxia        Dictated utilizing Dragon voice recognition software

## 2021-08-31 RX ORDER — OLMESARTAN MEDOXOMIL 40 MG/1
TABLET ORAL
Qty: 90 TABLET | Refills: 2 | Status: SHIPPED | OUTPATIENT
Start: 2021-08-31 | End: 2022-03-07

## 2021-11-22 ENCOUNTER — TELEPHONE (OUTPATIENT)
Dept: CARDIOLOGY CLINIC | Age: 86
End: 2021-11-22

## 2021-11-23 ENCOUNTER — OFFICE VISIT (OUTPATIENT)
Dept: CARDIOLOGY CLINIC | Age: 86
End: 2021-11-23
Payer: MEDICARE

## 2021-11-23 VITALS
WEIGHT: 178 LBS | SYSTOLIC BLOOD PRESSURE: 102 MMHG | DIASTOLIC BLOOD PRESSURE: 60 MMHG | HEIGHT: 69 IN | HEART RATE: 62 BPM | BODY MASS INDEX: 26.36 KG/M2

## 2021-11-23 DIAGNOSIS — I47.1 PSVT (PAROXYSMAL SUPRAVENTRICULAR TACHYCARDIA) (HCC): ICD-10-CM

## 2021-11-23 PROCEDURE — G8417 CALC BMI ABV UP PARAM F/U: HCPCS | Performed by: INTERNAL MEDICINE

## 2021-11-23 PROCEDURE — G8484 FLU IMMUNIZE NO ADMIN: HCPCS | Performed by: INTERNAL MEDICINE

## 2021-11-23 PROCEDURE — 99214 OFFICE O/P EST MOD 30 MIN: CPT | Performed by: INTERNAL MEDICINE

## 2021-11-23 PROCEDURE — 93000 ELECTROCARDIOGRAM COMPLETE: CPT | Performed by: INTERNAL MEDICINE

## 2021-11-23 PROCEDURE — 1036F TOBACCO NON-USER: CPT | Performed by: INTERNAL MEDICINE

## 2021-11-23 PROCEDURE — 1090F PRES/ABSN URINE INCON ASSESS: CPT | Performed by: INTERNAL MEDICINE

## 2021-11-23 PROCEDURE — 1123F ACP DISCUSS/DSCN MKR DOCD: CPT | Performed by: INTERNAL MEDICINE

## 2021-11-23 PROCEDURE — G8427 DOCREV CUR MEDS BY ELIG CLIN: HCPCS | Performed by: INTERNAL MEDICINE

## 2021-11-23 PROCEDURE — 4040F PNEUMOC VAC/ADMIN/RCVD: CPT | Performed by: INTERNAL MEDICINE

## 2021-12-06 DIAGNOSIS — I10 ESSENTIAL HYPERTENSION: ICD-10-CM

## 2021-12-07 RX ORDER — NEBIVOLOL 5 MG/1
TABLET ORAL
Qty: 60 TABLET | Refills: 5 | Status: SHIPPED | OUTPATIENT
Start: 2021-12-07 | End: 2022-06-01

## 2021-12-08 RX ORDER — HYDROCHLOROTHIAZIDE 12.5 MG/1
CAPSULE, GELATIN COATED ORAL
Qty: 30 CAPSULE | Refills: 5 | Status: SHIPPED | OUTPATIENT
Start: 2021-12-08 | End: 2022-06-01

## 2022-01-17 ENCOUNTER — LAB (OUTPATIENT)
Dept: LAB | Facility: HOSPITAL | Age: 87
End: 2022-01-17

## 2022-01-17 DIAGNOSIS — Z01.812 ENCOUNTER FOR PREOPERATIVE SCREENING LABORATORY TESTING FOR COVID-19 VIRUS: ICD-10-CM

## 2022-01-17 DIAGNOSIS — Z20.822 ENCOUNTER FOR PREOPERATIVE SCREENING LABORATORY TESTING FOR COVID-19 VIRUS: ICD-10-CM

## 2022-01-17 LAB — SARS-COV-2 ORF1AB RESP QL NAA+PROBE: NOT DETECTED

## 2022-01-17 PROCEDURE — C9803 HOPD COVID-19 SPEC COLLECT: HCPCS

## 2022-01-17 PROCEDURE — U0004 COV-19 TEST NON-CDC HGH THRU: HCPCS

## 2022-01-17 PROCEDURE — U0005 INFEC AGEN DETEC AMPLI PROBE: HCPCS

## 2022-01-19 ENCOUNTER — OFFICE VISIT (OUTPATIENT)
Dept: PULMONOLOGY | Facility: CLINIC | Age: 87
End: 2022-01-19

## 2022-01-19 VITALS
HEART RATE: 70 BPM | SYSTOLIC BLOOD PRESSURE: 132 MMHG | HEIGHT: 66 IN | WEIGHT: 174.4 LBS | OXYGEN SATURATION: 96 % | DIASTOLIC BLOOD PRESSURE: 80 MMHG | BODY MASS INDEX: 28.03 KG/M2

## 2022-01-19 DIAGNOSIS — E66.3 OVERWEIGHT: ICD-10-CM

## 2022-01-19 DIAGNOSIS — Z01.812 ENCOUNTER FOR PREOPERATIVE SCREENING LABORATORY TESTING FOR COVID-19 VIRUS: ICD-10-CM

## 2022-01-19 DIAGNOSIS — Z20.822 ENCOUNTER FOR PREOPERATIVE SCREENING LABORATORY TESTING FOR COVID-19 VIRUS: ICD-10-CM

## 2022-01-19 DIAGNOSIS — J44.9 STAGE 2 MODERATE COPD BY GOLD CLASSIFICATION: Primary | ICD-10-CM

## 2022-01-19 DIAGNOSIS — G47.34 NOCTURNAL HYPOXEMIA: ICD-10-CM

## 2022-01-19 PROBLEM — Z11.52 ENCOUNTER FOR PREOPERATIVE SCREENING LABORATORY TESTING FOR COVID-19 VIRUS: Status: ACTIVE | Noted: 2022-01-19

## 2022-01-19 PROCEDURE — 94727 GAS DIL/WSHOT DETER LNG VOL: CPT | Performed by: INTERNAL MEDICINE

## 2022-01-19 PROCEDURE — 99214 OFFICE O/P EST MOD 30 MIN: CPT | Performed by: INTERNAL MEDICINE

## 2022-01-19 PROCEDURE — 94375 RESPIRATORY FLOW VOLUME LOOP: CPT | Performed by: INTERNAL MEDICINE

## 2022-01-19 PROCEDURE — 94729 DIFFUSING CAPACITY: CPT | Performed by: INTERNAL MEDICINE

## 2022-01-19 NOTE — PROCEDURES
Pulmonary Function Test  Performed by: Ashtyn Jones, RRT  Authorized by: Stephen Cope MD      Pre Drug % Predicted    FVC: 74%   FEV1: 63%   FEF 25-75%: 37%   FEV1/FVC: 62.16%   T%   RV: 156%   DLCO: 70%   D/VAsb: 96%    Interpretation   Spirometry   Spirometry shows moderate obstruction.   Lung Volume Measurements  Measurements show elevated residual volume consistent with gas trapping.   Diffusion Capacity  The patient's diffusion capacity is mildly reduced.  Diffusion capacity is normal when corrected for alveolar volume.   Overall comments: The patient's spirometry is consistent with a moderate obstructive ventilatory defect.  Lung volumes reveal hyperinflation and a decrease in vital capacity secondary to obstruction.  There is a mild diffusion impairment which when corrected for alveolar volume is normalized.  When current studies are compared to studies done here in the office on 2019, she has had slight improvement in her FVC compared to previous and essentially no change in her FEV1 compared to previous.

## 2022-01-19 NOTE — ASSESSMENT & PLAN NOTE
Her BMI is slightly elevated.  Diet and exercise are encouraged and she will follow-up with her primary care physician regarding her elevated BMI otherwise.

## 2022-01-19 NOTE — ASSESSMENT & PLAN NOTE
Her pulmonary functions are stable to slightly improved today.  She may continue albuterol HFA as needed.

## 2022-01-19 NOTE — PROGRESS NOTES
Chief Complaint  Stage 2 moderate COPD    Subjective    History of Present Illness {CC  Problem List  Visit Diagnosis   Encounters  Notes  Medications  Labs  Result Review Imaging  Media     Angelia Munoz presents to [facility] for COPD.    History of Present Illness   The patient states she is doing very well clinically.  She occasionally shortness of breath if she has to climb stairs.  I did tell her she can utilize her albuterol HFA inhaler prior to such activities but she states at present she is virtually not using it at all.  Her PFTs are stable to slightly improved total is fine just to use her inhaler on an as-needed basis.  Prior to Admission medications    Medication Sig Start Date End Date Taking? Authorizing Provider   albuterol sulfate  (90 Base) MCG/ACT inhaler Inhale 2 puffs Every 4 (Four) Hours As Needed for Wheezing. 1/11/21  Yes Ivan Gupta MD   allopurinol (ZYLOPRIM) 100 MG tablet Take 100 mg by mouth Daily.   Yes ProviderMateus MD   amLODIPine (NORVASC) 5 MG tablet Take 5 mg by mouth Daily. 7/9/19  Yes Mateus Coleman MD   aspirin 325 MG tablet Take 325 mg by mouth Daily.   Yes Mateus Coleman MD   hydrochlorothiazide (MICROZIDE) 12.5 MG capsule Take 12.5 mg by mouth Daily. 5/31/19  Yes Mateus Coleman MD   nebivolol (BYSTOLIC) 10 MG tablet Take 5 mg by mouth 2 (Two) Times a Day.   Yes Mateus Coleman MD   O2 (OXYGEN) Inhale 2 L/min Every Night.   Yes Mateus Coleman MD   olmesartan (BENICAR) 40 MG tablet Take 40 mg by mouth Daily. 8/5/19  Yes Mateus Coleman MD   fluticasone (Flonase Allergy Relief) 50 MCG/ACT nasal spray 2 sprays into the nostril(s) as directed by provider Daily. 1/11/21 1/19/22  Ivan Gupta MD       Social History     Socioeconomic History   • Marital status:    Tobacco Use   • Smoking status: Former Smoker     Packs/day: 0.50     Years: 10.00      "Pack years: 5.00     Types: Cigarettes     Quit date:      Years since quittin.0   • Smokeless tobacco: Never Used   • Tobacco comment: quit 40 years ago   Vaping Use   • Vaping Use: Never used   Substance and Sexual Activity   • Alcohol use: Yes     Comment: occas   • Drug use: No   • Sexual activity: Defer       Objective   Vital Signs:   /80   Pulse 70   Ht 167.6 cm (66\")   Wt 79.1 kg (174 lb 6.4 oz)   SpO2 96% Comment: RA  BMI 28.15 kg/m²     Physical Exam  Vitals and nursing note reviewed.   HENT:      Head: Normocephalic.      Comments: She is wearing a mask.  Eyes:      Extraocular Movements: Extraocular movements intact.      Pupils: Pupils are equal, round, and reactive to light.   Cardiovascular:      Rate and Rhythm: Normal rate and regular rhythm.   Pulmonary:      Effort: Pulmonary effort is normal.      Comments: She has reasonable air movement bilaterally with no adventitious sounds heard.  Musculoskeletal:         General: Normal range of motion.   Skin:     General: Skin is warm and dry.   Neurological:      General: No focal deficit present.      Mental Status: She is alert and oriented to person, place, and time.   Psychiatric:         Mood and Affect: Mood normal.         Behavior: Behavior normal.        Result Review :{ Labs  Result Review  Imaging  Med Tab  Media :    PFT Values        Some values may be hidden. Unless noted otherwise, only the newest values recorded on each date are displayed.         Old Values PFT Results 22   No data to display.      Pre Drug PFT Results 22   FVC 74   FEV1 63   FEF 25-75% 37   FEV1/FVC 62.16      Post Drug PFT Results 22   No data to display.      Other Tests PFT Results 22         DLCO 70   D/VAsb 96               Results for orders placed in visit on 22    Pulmonary Function Test    Narrative  Pulmonary Function Test  Performed by: Ashtyn Jones, RRT  Authorized by: Stephen Cope " MD Ramakrishna    Pre Drug % Predicted  FVC: 74%  FEV1: 63%  FEF 25-75%: 37%  FEV1/FVC: 62.16%  T%  RV: 156%  DLCO: 70%  D/VAsb: 96%    Interpretation  Spirometry  Spirometry shows moderate obstruction.  Lung Volume Measurements  Measurements show elevated residual volume consistent with gas trapping.  Diffusion Capacity  The patient's diffusion capacity is mildly reduced.  Diffusion capacity is normal when corrected for alveolar volume.  Overall comments: The patient's spirometry is consistent with a moderate obstructive ventilatory defect.  Lung volumes reveal hyperinflation and a decrease in vital capacity secondary to obstruction.  There is a mild diffusion impairment which when corrected for alveolar volume is normalized.  When current studies are compared to studies done here in the office on 2019, she has had slight improvement in her FVC compared to previous and essentially no change in her FEV1 compared to previous.      Results for orders placed in visit on 19    Pulmonary Function Test      Results for orders placed in visit on 19    Pulmonary Function Test                 My interpretation of imaging:    COVID PRE-OP / PRE-PROCEDURE SCREENING ORDER (NO ISOLATION) - Swab, Nasal Cavity (2022 13:46)        Assessment and Plan {CC Problem List  Visit Diagnosis  ROS  Review (Popup)  Health Maintenance  Quality  BestPractice  Medications  SmartSets  SnapShot Encounters  Media      Diagnoses and all orders for this visit:    1. Stage 2 moderate COPD by GOLD classification (HCC) (Primary)  Assessment & Plan:  Her pulmonary functions are stable to slightly improved today.  She may continue albuterol HFA as needed.      Orders:  -     Pulmonary Function Test  -     Spirometry Without Bronchodilator; Future    2. Nocturnal hypoxemia  Assessment & Plan:  She may continue her nocturnal oxygen therapy.      3. Overweight  Assessment & Plan:  Her BMI is slightly elevated.  Diet  and exercise are encouraged and she will follow-up with her primary care physician regarding her elevated BMI otherwise.      4. Encounter for preoperative screening laboratory testing for COVID-19 virus  Assessment & Plan:  Her COVID test was negative.    Orders:  -     COVID PRE-OP / PRE-PROCEDURE SCREENING ORDER (NO ISOLATION) - Swab, Nasal Cavity; Future      Patient's Body mass index is 28.15 kg/m². indicating that she is overweight (BMI 25-29.9). Obesity-related health conditions include the following: hypertension. Obesity is unchanged.  Diet and exercise are encouraged and she will follow-up with her primary care physician regarding her elevated BMI otherwise.        Stephen Cope MD  1/19/2022  16:05 CST    Follow Up {Instructions Charge Capture  Follow-up Communications   Return in about 1 year (around 1/19/2023) for FVL.    Patient was given instructions and counseling regarding her condition or for health maintenance advice. Please see specific information pulled into the AVS if appropriate.

## 2022-01-19 NOTE — PATIENT INSTRUCTIONS
The patient's pulmonary functions are stable to slightly improved.  She is rarely if ever having to use her albuterol inhaler and I told her that was fine.  We will plan on a follow-up visit in 1 year with a flow-volume loop.

## 2022-03-07 RX ORDER — OLMESARTAN MEDOXOMIL 40 MG/1
TABLET ORAL
Qty: 90 TABLET | Refills: 2 | Status: SHIPPED | OUTPATIENT
Start: 2022-03-07

## 2022-04-12 RX ORDER — AMLODIPINE BESYLATE 5 MG/1
TABLET ORAL
Qty: 90 TABLET | Refills: 1 | Status: SHIPPED | OUTPATIENT
Start: 2022-04-12

## 2022-05-31 DIAGNOSIS — I10 ESSENTIAL HYPERTENSION: ICD-10-CM

## 2022-06-01 RX ORDER — HYDROCHLOROTHIAZIDE 12.5 MG/1
CAPSULE, GELATIN COATED ORAL
Qty: 30 CAPSULE | Refills: 5 | Status: SHIPPED | OUTPATIENT
Start: 2022-06-01

## 2022-06-01 RX ORDER — NEBIVOLOL 5 MG/1
TABLET ORAL
Qty: 60 TABLET | Refills: 5 | Status: SHIPPED | OUTPATIENT
Start: 2022-06-01

## 2022-08-26 ENCOUNTER — OFFICE VISIT (OUTPATIENT)
Dept: NEUROLOGY | Facility: CLINIC | Age: 87
End: 2022-08-26

## 2022-08-26 VITALS
RESPIRATION RATE: 18 BRPM | SYSTOLIC BLOOD PRESSURE: 130 MMHG | DIASTOLIC BLOOD PRESSURE: 80 MMHG | WEIGHT: 180 LBS | HEIGHT: 66 IN | BODY MASS INDEX: 28.93 KG/M2 | HEART RATE: 72 BPM

## 2022-08-26 DIAGNOSIS — Z86.73 HISTORY OF STROKE: ICD-10-CM

## 2022-08-26 DIAGNOSIS — G47.34 NOCTURNAL HYPOXEMIA: Primary | ICD-10-CM

## 2022-08-26 PROCEDURE — 99213 OFFICE O/P EST LOW 20 MIN: CPT | Performed by: PSYCHIATRY & NEUROLOGY

## 2022-08-26 NOTE — PATIENT INSTRUCTIONS
Patient to continue driving and safety precautions as discussed.  Patient to get to emergency room immediately if stroke symptoms occur.

## 2022-08-26 NOTE — PROGRESS NOTES
Subjective   Angelia Munoz, 3/26/1933, is a female who is being seen today for   Chief Complaint   Patient presents with   • Stroke       HISTORY OF PRESENT ILLNESS: Patient has had no stroke symptoms.  Patient continues 325 mg aspirin.  Patient was taken off of statin by her PCP.  Patient continues on O2 at night at 2 L/min.  Patient continues pulmonary follow-up per Dr. Mobley.  Patient not had any further falls since last seen.  History of craniotomy for previous meningioma    REVIEW OF SYSTEMS:   GENERAL: Patient blood pressure today 128/78 left arm seated 130/80 left arm standing with pulse 72.  [place] Sleepiness Scale is 2.  PULMONARY: As above  CVS: No acute chest pain or palpitation  GASTROINTESTINAL: No acute GI distress  GENITOURINARY: No acute  distress  GYN: No acute GYN distress  MUSCULOSKELETAL: No acute musculoskeletal symptoms  HEENT: No acute vision or hearing change  ENDOCRINE: Not diabetic  PSYCHIATRIC: No acute psychiatric symptoms  HEMATOLOGY: Blood work followed by PCP  SKIN: No acute skin changes  Family history reviewed and otherwise noncontributory to this problem  Social history: Patient denies smoking or drug use.  Patient does use alcohol.      PHYSICAL EXAMINATION:    GENERAL: No acute distress  CRANIUM: No specific tenderness over the cranium status postcraniotomy  HEENT:       EYES: EOMs intact without nystagmus and fields full to confrontation.  Pupils equal round reactive to light.  No acute fundic abnormalities.       EARS: Tympanic membranes normal no partially obscured by wax.  Patient hears tuning fork bilaterally with hearing aids in        THROAT: No acute oropharynx abnormality.       NECK: No bruits/no lymphadenopathy  CHEST: No acute cardiopulmonary abnormalities by auscultation   ABDOMEN: Nondistended  EXTREMITIES: Dorsalis pedis pulse symmetrical  NEURO: Patient alert and follows commands with difficulty  SPEECH: Normal    CRANIAL NERVES: Motor/sensory about the face  normal and symmetric    MOTOR STRENGTH: Motor strength upper and lower extremities normal  STATION AND GAIT: Gait normal/Romberg negative  CEREBELLAR: Finger-nose and heel-to-shin normal  SENSORY: Decreased pin and vibration distal to proximal lower extremities to the ankles bilaterally little more prominent right than left  REFLEXES: Decreased ankle jerk versus knee jerks bilaterally and slight decrease in knee jerk on the right versus left.  Biceps reflexes present bilaterally without clonus or Babinski      ASSESSMENT AND PLAN: Patient with history of nocturnal hypoxia and history of stroke.  Patient to continue driving and safety precautions as discussed.  Patient to get to emergency room immediately if stroke symptoms occur.  I spent 20 minutes with patient with counseling/ exam and review of records.      Diagnoses and all orders for this visit:    1. Nocturnal hypoxemia (Primary)    2. History of stroke        Dictated utilizing Dragon voice recognition software

## 2022-11-04 DIAGNOSIS — I10 ESSENTIAL HYPERTENSION: ICD-10-CM

## 2022-11-04 RX ORDER — NEBIVOLOL 5 MG/1
TABLET ORAL
Qty: 60 TABLET | Refills: 5 | OUTPATIENT
Start: 2022-11-04

## 2022-11-04 RX ORDER — HYDROCHLOROTHIAZIDE 12.5 MG/1
CAPSULE, GELATIN COATED ORAL
Qty: 30 CAPSULE | Refills: 5 | OUTPATIENT
Start: 2022-11-04

## 2022-11-07 RX ORDER — NEBIVOLOL 5 MG/1
TABLET ORAL
Qty: 60 TABLET | Refills: 5 | Status: SHIPPED | OUTPATIENT
Start: 2022-11-07 | End: 2022-11-29 | Stop reason: SDUPTHER

## 2022-11-28 ENCOUNTER — TELEPHONE (OUTPATIENT)
Dept: CARDIOLOGY CLINIC | Age: 87
End: 2022-11-28

## 2022-11-29 ENCOUNTER — OFFICE VISIT (OUTPATIENT)
Dept: CARDIOLOGY CLINIC | Age: 87
End: 2022-11-29
Payer: MEDICARE

## 2022-11-29 VITALS
DIASTOLIC BLOOD PRESSURE: 78 MMHG | SYSTOLIC BLOOD PRESSURE: 130 MMHG | WEIGHT: 177 LBS | HEIGHT: 69 IN | BODY MASS INDEX: 26.22 KG/M2 | HEART RATE: 61 BPM

## 2022-11-29 DIAGNOSIS — I47.1 PSVT (PAROXYSMAL SUPRAVENTRICULAR TACHYCARDIA) (HCC): Primary | ICD-10-CM

## 2022-11-29 DIAGNOSIS — I10 ESSENTIAL HYPERTENSION: ICD-10-CM

## 2022-11-29 PROCEDURE — G8484 FLU IMMUNIZE NO ADMIN: HCPCS | Performed by: INTERNAL MEDICINE

## 2022-11-29 PROCEDURE — G8417 CALC BMI ABV UP PARAM F/U: HCPCS | Performed by: INTERNAL MEDICINE

## 2022-11-29 PROCEDURE — 93000 ELECTROCARDIOGRAM COMPLETE: CPT | Performed by: INTERNAL MEDICINE

## 2022-11-29 PROCEDURE — 1090F PRES/ABSN URINE INCON ASSESS: CPT | Performed by: INTERNAL MEDICINE

## 2022-11-29 PROCEDURE — 1123F ACP DISCUSS/DSCN MKR DOCD: CPT | Performed by: INTERNAL MEDICINE

## 2022-11-29 PROCEDURE — 1036F TOBACCO NON-USER: CPT | Performed by: INTERNAL MEDICINE

## 2022-11-29 PROCEDURE — 99213 OFFICE O/P EST LOW 20 MIN: CPT | Performed by: INTERNAL MEDICINE

## 2022-11-29 PROCEDURE — G8427 DOCREV CUR MEDS BY ELIG CLIN: HCPCS | Performed by: INTERNAL MEDICINE

## 2022-11-29 RX ORDER — NEBIVOLOL 5 MG/1
TABLET ORAL
Qty: 180 TABLET | Refills: 3 | Status: SHIPPED | OUTPATIENT
Start: 2022-11-29

## 2022-11-29 NOTE — PROGRESS NOTES
HISTORY  80-year-old lady with a history of dyslipidemia, past tobacco abuse, hypertension, COPD, paroxysmal supraventricular tachycardia, and a previous CVA returns for a follow-up visit. Previously recovered from the stroke in 2019 with evaluation revealing no evidence of a shunt, preserved left ventricular systolic function, normal left atrial size, and mild diastolic dysfunction. She has mild chronic dyspnea on exertion against a backdrop history of longstanding tobacco abuse. Followed regularly by pulmonology. She has not been on statin therapy with July values , HDL 44, triglycerides 171. On return today she relates no symptoms that would suggest left ventricular dysfunction, dysrhythmia, cerebrovascular ischemia, or angina. She is followed by pulmonology and by neurology. She has been vaccinated and boosted x2 for COVID-19. PHYSICAL EXAM  On exam she carries 177 pounds in a 5 foot 9 inch frame. Pressure is 130/78 pulse 61. EOMs full, sclerae and conjunctiva normal. PERRLA. Mask in place. Trachea midline with no neck masses. Assessment of internal jugular veins reveals no elevation of central venous pressure at 45 degrees. Carotid pulses normal without delay or bruit. Thyroid normal to palpation. Chest exam reveals normal respiratory effort, no abnormal breath sounds and normal expiratory phase. No skin lesions seen. PMI normal. S1, S2 normal without murmur or calvin or click. Normal bowel sounds without palpable mass or bruit. No clubbing or acrocyanosis. No significant lower extremity edema or signs of venous insufficiency. General motor strength appears to be within normal limits. Normal range of motion with normal gait. Alert, oriented x 3, memory and cognition normal as reflected by history and conversation. EKG reveals sinus rhythm without abnormalities.     ASSESSMENT/PLAN:   Dyslipidemia -considering the presence of hypertension in the past tobacco abuse, probably deserves pharmacologic address of her dyslipidemia. Will defer to Dr. Lynn Sahu in that regard. Hypertension -marginal control. Continue Bystolic, Norvasc, clonidine, and hydrochlorothiazide  PSVT -no symptomatic recurrence.   Continue Bystolic  Pandemic response -appropriate/vaccinated/boosted

## 2022-12-01 DIAGNOSIS — I10 ESSENTIAL HYPERTENSION: ICD-10-CM

## 2022-12-01 RX ORDER — HYDROCHLOROTHIAZIDE 12.5 MG/1
CAPSULE, GELATIN COATED ORAL
Qty: 30 CAPSULE | Refills: 5 | Status: SHIPPED | OUTPATIENT
Start: 2022-12-01

## 2023-01-11 RX ORDER — AMLODIPINE BESYLATE 5 MG/1
TABLET ORAL
Qty: 90 TABLET | Refills: 1 | Status: SHIPPED | OUTPATIENT
Start: 2023-01-11

## 2023-01-18 ENCOUNTER — OFFICE VISIT (OUTPATIENT)
Dept: PULMONOLOGY | Facility: CLINIC | Age: 88
End: 2023-01-18
Payer: MEDICARE

## 2023-01-18 VITALS
HEART RATE: 62 BPM | SYSTOLIC BLOOD PRESSURE: 134 MMHG | DIASTOLIC BLOOD PRESSURE: 82 MMHG | WEIGHT: 177 LBS | OXYGEN SATURATION: 97 % | BODY MASS INDEX: 27.78 KG/M2 | HEIGHT: 67 IN

## 2023-01-18 DIAGNOSIS — Z87.891 FORMER SMOKER: ICD-10-CM

## 2023-01-18 DIAGNOSIS — G47.34 NOCTURNAL HYPOXEMIA: ICD-10-CM

## 2023-01-18 DIAGNOSIS — J44.9 STAGE 2 MODERATE COPD BY GOLD CLASSIFICATION: Primary | ICD-10-CM

## 2023-01-18 DIAGNOSIS — E66.3 OVERWEIGHT: ICD-10-CM

## 2023-01-18 PROCEDURE — 99214 OFFICE O/P EST MOD 30 MIN: CPT | Performed by: INTERNAL MEDICINE

## 2023-01-18 PROCEDURE — 94010 BREATHING CAPACITY TEST: CPT | Performed by: INTERNAL MEDICINE

## 2023-01-18 RX ORDER — ALBUTEROL SULFATE 90 UG/1
2 AEROSOL, METERED RESPIRATORY (INHALATION) EVERY 4 HOURS PRN
Qty: 18 G | Refills: 11 | Status: SHIPPED | OUTPATIENT
Start: 2023-01-18

## 2023-01-18 NOTE — ASSESSMENT & PLAN NOTE
I did provide her a printed prescription for an albuterol HFA inhaler to take as needed for shortness of breath or wheezing.

## 2023-01-18 NOTE — PATIENT INSTRUCTIONS
The patient's pulmonary functions are stable.  She has occasional issues with wheezing and does get a bit short of breath with exertion.  She does not have a rescue inhaler at present and I did provide her a printed prescription for an albuterol HFA inhaler to use as needed for shortness of breath or wheezing.  Otherwise I will see her back in one year with a flow-volume loop.

## 2023-01-18 NOTE — PROCEDURES
Spirometry Without Bronchodilator  Performed by: Ashtyn Jones, RRT  Authorized by: Stephen Cope MD      Pre Drug % Predicted    FVC: 76%   FEV1: 70%   FEF 25-75%: 60%   FEV1/FVC: 69%    Interpretation   Spirometry   Spirometry shows moderate obstruction.   Overall comments: The patient's spirometry is consistent with a moderate obstructive ventilatory defect.  When current studies compared to studies performed on January 19, 2022, there has been a slight drop in her FVC and slight improvement in her FEV1 compared to previous.

## 2023-01-18 NOTE — PROGRESS NOTES
Chief Complaint  Shortness of Breath    Subjective    History of Present Illness     Angelia Munoz presents to [facility] for shortness of breath.    History of Present Illness   The patient overall is doing well from a pulmonary standpoint although she does get a bit short of breath with exertion.  She does not have a prescription at present for rescue inhaler and I did provide her a printed prescription in this regard.  She does wheeze occasionally as well and I told her she could use the inhaler whenever she has issues with shortness of breath or wheezing.  Her pulmonary function today show a slight but not significant drop in her FVC and a slight improvement in her FEV1 compared to previous.  I reviewed the studies with her.  She has had the COVID-19 vaccine including a booster in the form of the Moderna vaccine.  She has had her flu shot and is also had a Prevnar 13 in the past.    Prior to Admission medications    Medication Sig Start Date End Date Taking? Authorizing Provider   allopurinol (ZYLOPRIM) 100 MG tablet Take 100 mg by mouth Daily.   Yes ProviderMateus MD   amLODIPine (NORVASC) 5 MG tablet Take 5 mg by mouth Daily. 7/9/19  Yes ProviderMateus MD   aspirin 325 MG tablet Take 325 mg by mouth Daily.   Yes ProviderMateus MD   hydrochlorothiazide (MICROZIDE) 12.5 MG capsule Take 12.5 mg by mouth Daily. 5/31/19  Yes ProviderMateus MD   nebivolol (BYSTOLIC) 10 MG tablet Take 5 mg by mouth 2 (Two) Times a Day.   Yes ProviderMateus MD   O2 (OXYGEN) Inhale 2 L/min Every Night.   Yes ProviderMateus MD   olmesartan (BENICAR) 40 MG tablet Take 40 mg by mouth Daily. 8/5/19  Yes ProviderMateus MD   albuterol sulfate  (90 Base) MCG/ACT inhaler Inhale 2 puffs Every 4 (Four) Hours As Needed for Wheezing. 1/11/21   Ivan Gupta MD   albuterol sulfate  (90 Base) MCG/ACT inhaler Inhale 2 puffs Every 4  "(Four) Hours As Needed for Wheezing or Shortness of Air. 23   Stepehn Cope MD       Social History     Socioeconomic History   • Marital status:    Tobacco Use   • Smoking status: Former     Packs/day: 0.50     Years: 10.00     Pack years: 5.00     Types: Cigarettes     Quit date:      Years since quittin.0   • Smokeless tobacco: Never   • Tobacco comments:     quit 40 years ago   Vaping Use   • Vaping Use: Never used   Substance and Sexual Activity   • Alcohol use: Yes     Comment: occas   • Drug use: No   • Sexual activity: Defer       Objective   Vital Signs:   /82   Pulse 62   Ht 168.9 cm (66.5\")   Wt 80.3 kg (177 lb)   SpO2 97% Comment: RA  BMI 28.14 kg/m²     Physical Exam  Vitals and nursing note reviewed.   Constitutional:       Comments: Her BMI is somewhat elevated.   HENT:      Head: Normocephalic.      Comments: She is wearing a mask.  Eyes:      Extraocular Movements: Extraocular movements intact.      Pupils: Pupils are equal, round, and reactive to light.   Cardiovascular:      Rate and Rhythm: Normal rate and regular rhythm.   Pulmonary:      Effort: Pulmonary effort is normal.      Breath sounds: Normal breath sounds.   Musculoskeletal:         General: Normal range of motion.   Skin:     General: Skin is warm and dry.   Neurological:      General: No focal deficit present.      Mental Status: She is alert and oriented to person, place, and time.   Psychiatric:         Mood and Affect: Mood normal.         Behavior: Behavior normal.        Result Review :    PFT Values        Some values may be hidden. Unless noted otherwise, only the newest values recorded on each date are displayed.         Old Values PFT Results 22   No data to display.      Pre Drug PFT Results 22   FVC 74 76   FEV1 63 70   FEF 25-75% 37 60   FEV1/FVC 62.16 69      Post Drug PFT Results 22   No data to display.      Other Tests PFT Results 22 " 23           DLCO 70    D/VAsb 96                Results for orders placed in visit on 23    Spirometry Without Bronchodilator    Narrative  Spirometry Without Bronchodilator  Performed by: Ashtyn Jones, PAMELA  Authorized by: Stephen Cope MD    Pre Drug % Predicted  FVC: 76%  FEV1: 70%  FEF 25-75%: 60%  FEV1/FVC: 69%    Interpretation  Spirometry  Spirometry shows moderate obstruction.  Overall comments: The patient's spirometry is consistent with a moderate obstructive ventilatory defect.  When current studies compared to studies performed on 2022, there has been a slight drop in her FVC and slight improvement in her FEV1 compared to previous.      Results for orders placed in visit on 22    Pulmonary Function Test    Narrative  Pulmonary Function Test  Performed by: Ashtyn Jones RRT  Authorized by: Stephen Cope MD    Pre Drug % Predicted  FVC: 74%  FEV1: 63%  FEF 25-75%: 37%  FEV1/FVC: 62.16%  T%  RV: 156%  DLCO: 70%  D/VAsb: 96%    Interpretation  Spirometry  Spirometry shows moderate obstruction.  Lung Volume Measurements  Measurements show elevated residual volume consistent with gas trapping.  Diffusion Capacity  The patient's diffusion capacity is mildly reduced.  Diffusion capacity is normal when corrected for alveolar volume.  Overall comments: The patient's spirometry is consistent with a moderate obstructive ventilatory defect.  Lung volumes reveal hyperinflation and a decrease in vital capacity secondary to obstruction.  There is a mild diffusion impairment which when corrected for alveolar volume is normalized.  When current studies are compared to studies done here in the office on 2019, she has had slight improvement in her FVC compared to previous and essentially no change in her FEV1 compared to previous.      Results for orders placed in visit on 19    Pulmonary Function Test                 My  interpretation of imaging:    XR CHEST PORTABLE (02/10/2018 21:42 EST)        Assessment and Plan     Diagnoses and all orders for this visit:    1. 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      Orders:  -     Spirometry Without Bronchodilator  -     albuterol sulfate  (90 Base) MCG/ACT inhaler; Inhale 2 puffs Every 4 (Four) Hours As Needed for Wheezing or Shortness of Air.  Dispense: 18 g; Refill: 11    2. Nocturnal hypoxemia  Assessment & Plan:  She may continue her nocturnal oxygen therapy.      3. Former smoker  Assessment & Plan:  She has a minimal smoking history having quit in 1989.      4. Overweight  Assessment & Plan:  Patient's (Body mass index is 28.14 kg/m².) indicates that they are overweight with health conditions that include hypertension . Weight is unchanged.  Diet and exercise are encouraged and she will follow-up with her primary care physician regarding her elevated BMI otherwise.            Stephen Cope MD  1/18/2023  14:27 CST    Follow Up   Return in about 1 year (around 1/18/2024) for To see me specifically, FVL.    Patient was given instructions and counseling regarding her condition or for health maintenance advice. Please see specific information pulled into the AVS if appropriate.

## 2023-01-18 NOTE — ASSESSMENT & PLAN NOTE
Patient's (Body mass index is 28.14 kg/m².) indicates that they are overweight with health conditions that include hypertension . Weight is unchanged.  Diet and exercise are encouraged and she will follow-up with her primary care physician regarding her elevated BMI otherwise.

## 2023-02-28 RX ORDER — OLMESARTAN MEDOXOMIL 40 MG/1
TABLET ORAL
Qty: 90 TABLET | Refills: 2 | Status: SHIPPED | OUTPATIENT
Start: 2023-02-28

## 2023-03-08 ENCOUNTER — TELEPHONE (OUTPATIENT)
Dept: CARDIOLOGY CLINIC | Age: 88
End: 2023-03-08

## 2023-04-17 RX ORDER — AMLODIPINE BESYLATE 5 MG/1
TABLET ORAL
Qty: 90 TABLET | Refills: 3 | Status: SHIPPED | OUTPATIENT
Start: 2023-04-17

## 2023-04-27 ENCOUNTER — TELEPHONE (OUTPATIENT)
Dept: NEUROLOGY | Facility: CLINIC | Age: 88
End: 2023-04-27
Payer: MEDICARE

## 2023-04-27 NOTE — TELEPHONE ENCOUNTER
Caller: [facility]    Relationship:     Best call back number: [phone]    What was the call regarding: [employer] [facility] CALLED AND WANTED THE LAST OFFICE VISIT NOTES FAXED @[phone] ADD ATTENTION SOY ON THE FAX     PLEASE REVIEW AND ADVISE    THANK YOU

## 2023-06-05 DIAGNOSIS — I10 ESSENTIAL HYPERTENSION: ICD-10-CM

## 2023-06-05 RX ORDER — HYDROCHLOROTHIAZIDE 12.5 MG/1
CAPSULE, GELATIN COATED ORAL
Qty: 30 CAPSULE | Refills: 5 | Status: SHIPPED | OUTPATIENT
Start: 2023-06-05

## 2023-07-27 ENCOUNTER — DOCUMENTATION (OUTPATIENT)
Dept: NEUROLOGY | Facility: CLINIC | Age: 88
End: 2023-07-27
Payer: MEDICARE

## 2023-07-28 ENCOUNTER — HOSPITAL ENCOUNTER (EMERGENCY)
Age: 88
Discharge: HOME | End: 2023-07-28
Attending: EMERGENCY MEDICINE
Payer: MEDICARE

## 2023-07-28 ENCOUNTER — OFFICE VISIT (OUTPATIENT)
Dept: NEUROLOGY | Facility: CLINIC | Age: 88
End: 2023-07-28
Payer: MEDICARE

## 2023-07-28 ENCOUNTER — APPOINTMENT (OUTPATIENT)
Dept: CT IMAGING | Age: 88
End: 2023-07-28
Payer: MEDICARE

## 2023-07-28 VITALS
HEART RATE: 70 BPM | BODY MASS INDEX: 28.61 KG/M2 | WEIGHT: 178 LBS | DIASTOLIC BLOOD PRESSURE: 78 MMHG | RESPIRATION RATE: 18 BRPM | SYSTOLIC BLOOD PRESSURE: 128 MMHG | HEIGHT: 66 IN

## 2023-07-28 VITALS
SYSTOLIC BLOOD PRESSURE: 105 MMHG | HEIGHT: 69 IN | HEART RATE: 69 BPM | WEIGHT: 170 LBS | RESPIRATION RATE: 15 BRPM | DIASTOLIC BLOOD PRESSURE: 71 MMHG | BODY MASS INDEX: 25.18 KG/M2 | TEMPERATURE: 97.9 F | OXYGEN SATURATION: 94 %

## 2023-07-28 DIAGNOSIS — Z86.73 HISTORY OF STROKE: Primary | ICD-10-CM

## 2023-07-28 DIAGNOSIS — R09.02 HYPOXEMIA: ICD-10-CM

## 2023-07-28 DIAGNOSIS — S81.811A SKIN TEAR OF RIGHT LOWER LEG WITHOUT COMPLICATION, INITIAL ENCOUNTER: ICD-10-CM
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
## 2023-07-28 DIAGNOSIS — S81.812A SKIN TEAR OF LEFT LOWER LEG WITHOUT COMPLICATION, INITIAL ENCOUNTER: ICD-10-CM

## 2023-07-28 DIAGNOSIS — S09.90XA CLOSED HEAD INJURY, INITIAL ENCOUNTER: Primary | ICD-10-CM

## 2023-07-28 PROCEDURE — 90471 IMMUNIZATION ADMIN: CPT | Performed by: EMERGENCY MEDICINE

## 2023-07-28 PROCEDURE — 90715 TDAP VACCINE 7 YRS/> IM: CPT | Performed by: EMERGENCY MEDICINE

## 2023-07-28 PROCEDURE — 99284 EMERGENCY DEPT VISIT MOD MDM: CPT

## 2023-07-28 PROCEDURE — 6360000002 HC RX W HCPCS: Performed by: EMERGENCY MEDICINE

## 2023-07-28 PROCEDURE — 70450 CT HEAD/BRAIN W/O DYE: CPT

## 2023-07-28 RX ADMIN — TETANUS TOXOID, REDUCED DIPHTHERIA TOXOID AND ACELLULAR PERTUSSIS VACCINE, ADSORBED 0.5 ML: 5; 2.5; 8; 8; 2.5 SUSPENSION INTRAMUSCULAR at 21:01

## 2023-07-28 ASSESSMENT — ENCOUNTER SYMPTOMS
RHINORRHEA: 0
ABDOMINAL PAIN: 0
BACK PAIN: 0
NAUSEA: 0
SHORTNESS OF BREATH: 0
DIARRHEA: 0
COUGH: 0
VOMITING: 0

## 2023-07-28 ASSESSMENT — PAIN DESCRIPTION - LOCATION: LOCATION: HEAD

## 2023-07-28 ASSESSMENT — PAIN - FUNCTIONAL ASSESSMENT: PAIN_FUNCTIONAL_ASSESSMENT: 0-10

## 2023-07-28 ASSESSMENT — PAIN SCALES - GENERAL: PAINLEVEL_OUTOF10: 3

## 2023-07-28 NOTE — PROGRESS NOTES
Subjective   Angelia Munoz, 3/26/1933, is a female who is being seen today for   Chief Complaint   Patient presents with    Stroke     Hypoxia       HISTORY OF PRESENT ILLNESS: Patient seen for history of stroke in 2019.  Patient not had any further stroke symptoms.  Patient continues O2 at night 2 L/min with previous overnight continuous oximetry in 2019 showing over 30 minutes less than or equal to 89% oxygen saturation with 80% low.  She is going to discuss with Dr. Mobley when she sees him in January if she needs to continue the oxygen at night.  Patient has not had any more falls.  Patient denies any unusual headaches.  Vidor Sleepiness Scale is 3.  I discussed driving precautions and safety precautions and fall precautions in detail    REVIEW OF SYSTEMS:   GENERAL: Blood pressure today is 128/78 left arm seated in same standing with pulse 70.  PULMONARY: Patient sometimes feels short of breath when walking distances  CVS: Patient seeing cardiology  GASTROINTESTINAL: No acute GI distress  GENITOURINARY: No acute  distress  GYN: No acute GYN distress  MUSCULOSKELETAL: No acute musculoskeletal changes  HEENT: Patient very hard of hearing and wears hearing aids  ENDOCRINE: Not diabetic  PSYCHIATRIC: No acute psychiatric symptoms  HEMATOLOGY: No blood work for review but followed by PCP  SKIN: No acute skin changes  Family history reviewed and otherwise noncontributory  Social history: Patient denies smoking or drug use.  Patient does use alcohol       PHYSICAL EXAMINATION:    GENERAL: No acute distress  CRANIUM: Status post craniotomy for meningioma  HEENT:       EYES: EOMs intact without nystagmus and fields full to confrontation.  Pupils equal round reactive to light.  No acute fundic abnormalities.       EARS: Tympanic membranes partially obscured by wax bilaterally but hears tuning fork bilaterally with hearing aids in       THROAT: No acute oropharynx abnormalities no swallowing difficulties by history        NECK: No bruits/no lymphadenopathy  CHEST: No acute cardiopulmonary abnormalities by auscultation  ABDOMEN: Nondistended  EXTREMITIES: Dorsalis pedis pulse symmetrical and normal  NEURO: Patient alert and follows commands without difficulty except for her hearing problems  SPEECH: Normal    CRANIAL NERVES: Motor/sensory about face normal and symmetric    MOTOR STRENGTH: Motor strength upper and lower extremities normal  STATION AND GAIT: Gait normal/Romberg negative  CEREBELLAR: Finger-nose and heel-to-shin normal  SENSORY: Decreased pin and vibration distal proximal lower extremities ankles bilaterally  REFLEXES: Decreased ankle jerk bilaterally versus knee jerk and biceps bilaterally without clonus or Babinski.  Slight decrease in knee jerk on the right versus left      ASSESSMENT AND PLAN: Patient with a history of stroke and hypoxemia.  Stable at this time.  Patient continues the aspirin 325 mg daily.  Patient continues oxygen at night.  I spent 25 minutes with this patient with counseling exam and review of records.  BMI in normal range for age.  I did receive patient's blood work which showed normal T4 slightly elevated TSH.  Cholesterol 234 and triglycerides 181.  Metabolic panel showed some elevation of creatinine 1.2 with estimated GFR 43.  CBC showed no significant abnormalities.  Patient to follow-up with PCP about blood work      Diagnoses and all orders for this visit:    1. History of stroke (Primary)    2. Hypoxemia        Dictated utilizing Dragon voice recognition software

## 2023-07-28 NOTE — PATIENT INSTRUCTIONS
Patient to get to the emergency room immediately if stroke symptoms occur.  Patient to continue driving and safety precautions as discussed.  Patient to discuss with Dr. Mobley about continuing oxygen at night.  Patient to follow-up with PCP about blood work
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
## 2023-09-05 RX ORDER — OLMESARTAN MEDOXOMIL 40 MG/1
TABLET ORAL
Qty: 90 TABLET | Refills: 2 | Status: SHIPPED | OUTPATIENT
Start: 2023-09-05

## 2023-11-13 ENCOUNTER — TELEPHONE (OUTPATIENT)
Dept: CARDIOLOGY CLINIC | Age: 88
End: 2023-11-13

## 2023-11-13 NOTE — TELEPHONE ENCOUNTER
Patient messaged me today that her daughter's mother in law  who is also a close friend. She requested to reschedule her appointment with Dr. Merlin Simmers which is this Wednesday. I don't think she is having any problems.   tlm

## 2023-12-04 DIAGNOSIS — I10 ESSENTIAL HYPERTENSION: ICD-10-CM

## 2023-12-05 RX ORDER — HYDROCHLOROTHIAZIDE 12.5 MG/1
CAPSULE, GELATIN COATED ORAL
Qty: 30 CAPSULE | Refills: 5 | OUTPATIENT
Start: 2023-12-05

## 2024-01-05 ENCOUNTER — OFFICE VISIT (OUTPATIENT)
Dept: CARDIOLOGY CLINIC | Age: 89
End: 2024-01-05
Payer: MEDICARE

## 2024-01-05 VITALS
HEART RATE: 70 BPM | WEIGHT: 179 LBS | DIASTOLIC BLOOD PRESSURE: 80 MMHG | SYSTOLIC BLOOD PRESSURE: 124 MMHG | BODY MASS INDEX: 26.51 KG/M2 | HEIGHT: 69 IN

## 2024-01-05 DIAGNOSIS — I47.10 PSVT (PAROXYSMAL SUPRAVENTRICULAR TACHYCARDIA): ICD-10-CM

## 2024-01-05 DIAGNOSIS — I10 ESSENTIAL HYPERTENSION: Primary | ICD-10-CM

## 2024-01-05 PROCEDURE — 1090F PRES/ABSN URINE INCON ASSESS: CPT | Performed by: INTERNAL MEDICINE

## 2024-01-05 PROCEDURE — 1036F TOBACCO NON-USER: CPT | Performed by: INTERNAL MEDICINE

## 2024-01-05 PROCEDURE — G8427 DOCREV CUR MEDS BY ELIG CLIN: HCPCS | Performed by: INTERNAL MEDICINE

## 2024-01-05 PROCEDURE — 1123F ACP DISCUSS/DSCN MKR DOCD: CPT | Performed by: INTERNAL MEDICINE

## 2024-01-05 PROCEDURE — G8419 CALC BMI OUT NRM PARAM NOF/U: HCPCS | Performed by: INTERNAL MEDICINE

## 2024-01-05 PROCEDURE — 99214 OFFICE O/P EST MOD 30 MIN: CPT | Performed by: INTERNAL MEDICINE

## 2024-01-05 PROCEDURE — G8484 FLU IMMUNIZE NO ADMIN: HCPCS | Performed by: INTERNAL MEDICINE

## 2024-01-05 ASSESSMENT — ENCOUNTER SYMPTOMS
SHORTNESS OF BREATH: 0
EYE DISCHARGE: 0
BLOOD IN STOOL: 0
CONSTIPATION: 0
WHEEZING: 0
BACK PAIN: 0
COUGH: 0
VOMITING: 0
ABDOMINAL DISTENTION: 0
DIARRHEA: 0

## 2024-01-05 NOTE — PROGRESS NOTES
person, place, and time.      Cranial Nerves: No cranial nerve deficit.      Deep Tendon Reflexes: Reflexes normal.           Labs:   CBC: No results for input(s): \"WBC\", \"HGB\", \"HCT\", \"PLT\" in the last 72 hours.  BMP:No results for input(s): \"NA\", \"K\", \"CO2\", \"BUN\", \"CREATININE\", \"LABGLOM\", \"GLUCOSE\" in the last 72 hours.  BNP: No results for input(s): \"BNP\" in the last 72 hours.  PT/INR: No results for input(s): \"PROTIME\", \"INR\" in the last 72 hours.  APTT:No results for input(s): \"APTT\" in the last 72 hours.  CARDIAC ENZYMES:No results for input(s): \"CKTOTAL\", \"CKMB\", \"CKMBINDEX\", \"TROPONINI\" in the last 72 hours.  FASTING LIPID PANEL:  Lab Results   Component Value Date/Time    HDL 49 02/11/2018 06:25 AM    LDLCALC 131 02/11/2018 06:25 AM    TRIG 105 02/11/2018 06:25 AM     LIVER PROFILE:No results for input(s): \"AST\", \"ALT\", \"LABALBU\" in the last 72 hours.        Imaging:     Conclusions      Summary   Normal left ventricular chamber size and systolic function.   Mild concentric left ventricular hypertrophy.   Left ventricular ejection fraction is visually estimated at 60-65%.      Signature      ----------------------------------------------------------------   Electronically signed by Leandro Vegas DO(Interpreting   physician) on 05/19/2021 03:30 PM   ----------------------------------------------------------------      ASSESSMENT and PLAN:    89-year-old female patient with past medical history of hypertension, prior tobacco use/COPD, prior CVA 2019 without residual, normal LV systolic function with negative submaximal stress echo 5/2021 presenting for follow-up evaluation.    1.  She is an excellent physical condition for her stated age and continues to live independently and manage on her own.  Blood pressure appears well-controlled.  Last echocardiogram from 2021 showed preserved LV systolic function with only mild LVH.  No anginal symptoms reported.  No volume retention.  At this time we will

## 2024-01-24 ENCOUNTER — OFFICE VISIT (OUTPATIENT)
Dept: PULMONOLOGY | Facility: CLINIC | Age: 89
End: 2024-01-24
Payer: MEDICARE

## 2024-01-24 VITALS
OXYGEN SATURATION: 97 % | WEIGHT: 180.4 LBS | HEART RATE: 64 BPM | DIASTOLIC BLOOD PRESSURE: 86 MMHG | BODY MASS INDEX: 28.99 KG/M2 | SYSTOLIC BLOOD PRESSURE: 142 MMHG | HEIGHT: 66 IN

## 2024-01-24 DIAGNOSIS — E66.3 OVERWEIGHT: Chronic | ICD-10-CM

## 2024-01-24 DIAGNOSIS — G47.34 NOCTURNAL HYPOXEMIA: Chronic | ICD-10-CM

## 2024-01-24 DIAGNOSIS — J44.9 STAGE 2 MODERATE COPD BY GOLD CLASSIFICATION: Primary | Chronic | ICD-10-CM

## 2024-01-24 RX ORDER — TELMISARTAN 80 MG/1
80 TABLET ORAL EVERY 24 HOURS
COMMUNITY

## 2024-01-24 RX ORDER — METOPROLOL TARTRATE 100 MG/1
100 TABLET ORAL 2 TIMES DAILY
COMMUNITY

## 2024-01-24 NOTE — PROGRESS NOTES
Chief Complaint  COPD    Subjective    History of Present Illness {CC  Problem List  Visit Diagnosis   Encounters  Notes  Medications  Labs  Result Review Imaging  Media: 23}    Angelia Munoz presents to [facility] for COPD.    History of Present Illness   The patient is doing quite well from pulmonary standpoint.  She complains of mild shortness of breath with exertion but this is not a major issue at present and she states if she stops and rest she does fine.  She is not interested in trying any inhaled medications at this time on a routine basis but does have an albuterol HFA inhaler to use if need be.  Her pulmonary functions today show slight decline from previous but not of significant degree and I reviewed these with her.  She would still be considered to have a moderate obstructive ventilatory defect.  She has had the COVID-19 vaccine including a booster in the form of the Moderna vaccine.  She had a flu shot this past fall and has had a Prevnar 13 and an unspecified pneumococcal vaccine in the past as well.  Prior to Admission medications    Medication Sig Start Date End Date Taking? Authorizing Provider   albuterol sulfate  (90 Base) MCG/ACT inhaler Inhale 2 puffs Every 4 (Four) Hours As Needed for Wheezing or Shortness of Air. 1/18/23  Yes Stephen Cope MD   allopurinol (ZYLOPRIM) 100 MG tablet Take 1 tablet by mouth Daily.   Yes ProviderMateus MD   amLODIPine (NORVASC) 5 MG tablet Take 1 tablet by mouth Daily. 7/9/19  Yes Mateus Coleman MD   aspirin 325 MG tablet Take 1 tablet by mouth Daily.   Yes Mateus Coleman MD   hydrochlorothiazide (MICROZIDE) 12.5 MG capsule Take 1 capsule by mouth Daily. 5/31/19  Yes Mateus Coleman MD   metoprolol tartrate (LOPRESSOR) 100 MG tablet Take 1 tablet by mouth 2 (Two) Times a Day.   Yes Mateus Coleman MD   nebivolol (BYSTOLIC) 10 MG tablet Take 0.5 tablets  "by mouth 2 (Two) Times a Day.   Yes Mateus Coleman MD   O2 (OXYGEN) Inhale 2 L/min Every Night.   Yes Mateus Coleman MD   olmesartan (BENICAR) 40 MG tablet Take 1 tablet by mouth Daily. 19  Yes Mateus Coleman MD   telmisartan (Micardis) 80 MG tablet Take 1 tablet by mouth Daily.  Patient not taking: Reported on 2024    Mateus Coleman MD       Social History     Socioeconomic History    Marital status:    Tobacco Use    Smoking status: Former     Packs/day: 0.50     Years: 10.00     Additional pack years: 0.00     Total pack years: 5.00     Types: Cigarettes     Quit date:      Years since quittin.0     Passive exposure: Past    Smokeless tobacco: Never    Tobacco comments:     quit 40 years ago   Vaping Use    Vaping Use: Never used   Substance and Sexual Activity    Alcohol use: Yes     Comment: occas    Drug use: No    Sexual activity: Defer       Objective   Vital Signs:   /86   Pulse 64   Ht 167.6 cm (65.98\")   Wt 81.8 kg (180 lb 6.4 oz)   SpO2 97% Comment: RA  BMI 29.13 kg/m²     Physical Exam  Vitals and nursing note reviewed.   Constitutional:       Comments: Her BMI is somewhat elevated.   HENT:      Head: Normocephalic.   Eyes:      Extraocular Movements: Extraocular movements intact.      Pupils: Pupils are equal, round, and reactive to light.   Cardiovascular:      Rate and Rhythm: Normal rate and regular rhythm.   Pulmonary:      Effort: Pulmonary effort is normal.      Breath sounds: Normal breath sounds.   Musculoskeletal:         General: Normal range of motion.   Skin:     General: Skin is warm and dry.   Neurological:      General: No focal deficit present.      Mental Status: She is alert and oriented to person, place, and time.   Psychiatric:         Mood and Affect: Mood normal.         Behavior: Behavior normal.        Result Review :    PFT Values          2023    13:00 2024    14:15   Pre Drug PFT Results   FVC 76 75 "   FEV1 70 64   FEF 25-75% 60    FEV1/FVC 69 63         Results for orders placed in visit on 24    Spirometry    Narrative  Spirometry    Performed by: Ashtyn Jones RRT  Authorized by: Stephen Cope MD  Pre Drug % Predicted  FVC: 75%  FEV1: 64%  FEV1/FVC: 63%    Interpretation  Spirometry  Spirometry shows moderate obstruction.  Overall comments: The patient's spirometry is consistent with a moderate obstructive ventilatory defect.  When current studies are compared to studies from January 10, 2023, she has had a slight decline in the FVC and also a decline in her FEV1 compared to previous but would still be considered to have a moderate obstructive ventilatory defect.      Results for orders placed in visit on 23    Spirometry Without Bronchodilator    Narrative  Spirometry Without Bronchodilator  Performed by: Ashtyn Jones RRT  Authorized by: Stephen Cope MD    Pre Drug % Predicted  FVC: 76%  FEV1: 70%  FEF 25-75%: 60%  FEV1/FVC: 69%    Interpretation  Spirometry  Spirometry shows moderate obstruction.  Overall comments: The patient's spirometry is consistent with a moderate obstructive ventilatory defect.  When current studies compared to studies performed on 2022, there has been a slight drop in her FVC and slight improvement in her FEV1 compared to previous.      Results for orders placed in visit on 22    Pulmonary Function Test    Narrative  Pulmonary Function Test  Performed by: Ashtyn Jones RRT  Authorized by: Stephen Cope MD    Pre Drug % Predicted  FVC: 74%  FEV1: 63%  FEF 25-75%: 37%  FEV1/FVC: 62.16%  T%  RV: 156%  DLCO: 70%  D/VAsb: 96%    Interpretation  Spirometry  Spirometry shows moderate obstruction.  Lung Volume Measurements  Measurements show elevated residual volume consistent with gas trapping.  Diffusion Capacity  The patient's diffusion capacity is mildly reduced.  Diffusion capacity is normal  when corrected for alveolar volume.  Overall comments: The patient's spirometry is consistent with a moderate obstructive ventilatory defect.  Lung volumes reveal hyperinflation and a decrease in vital capacity secondary to obstruction.  There is a mild diffusion impairment which when corrected for alveolar volume is normalized.  When current studies are compared to studies done here in the office on June 26, 2019, she has had slight improvement in her FVC compared to previous and essentially no change in her FEV1 compared to previous.                 My interpretation of labs:   CBC & Differential (08/02/2019 20:10)     Assessment and Plan {CC Problem List  Visit Diagnosis  ROS  Review (Popup)  Health Maintenance  Quality  BestPractice  Medications  SmartSets  SnapShot Encounters  Media : 23}    Diagnoses and all orders for this visit:    1. Stage 2 moderate COPD by GOLD classification (Primary)  Assessment & Plan:  She has not had to use any inhalers recently but can utilize albuterol HFA if need be.        Orders:  -     Spirometry  -     Spirometry; Future    2. Nocturnal hypoxemia  Assessment & Plan:  She may continue her nocturnal oxygen therapy.      3. Overweight  Assessment & Plan:  Patient's (Body mass index is 29.13 kg/m².) indicates that they are overweight with health conditions that include hypertension . Weight is unchanged.  Diet and exercise are encouraged and she will follow-up with her primary care physician regarding her elevated BMI otherwise.            Stephen Cope MD  1/24/2024  14:49 CST    Follow Up   Return in about 1 year (around 1/24/2025) for FVL.    Patient was given instructions and counseling regarding her condition or for health maintenance advice. Please see specific information pulled into the AVS if appropriate.

## 2024-01-24 NOTE — ASSESSMENT & PLAN NOTE
She may continue her nocturnal oxygen therapy.   [FreeTextEntry1] : Mr. Markell Echeverria is a 61 year old male with a chief complaint of right knee and right hip pain. He has undergone a right hip replacement, however he is still having pain. He has also had a right knee surgery with no relief of his pain. He has also undergone right knee steroid injections with no relief. He is currently under the care of Dr. Leigh who prescribes him Endocet, gabapentin and naproxen. He notes the combination of the medications takes almost 100% of his pain away for the majority of the day. He notes his doctor had wanted him to undergo injections in the lower back which the patient did not agree with, as he does not have lower back pain. \par \par At this time, I have agreed to continue his medication management in the form of Endocet  mg, Gabapentin, and naproxen. We will refer the patient to the neuropsychologist to determine if he is a candidate for chronic narcotic medications vs. an intrathecal pain pump. Overall there is at least a 30-50% reduction in pain with the prescribed analgesics. The patient denies any adverse side effects due to the medication (sleeping disturbance, constipation, sleepiness, hallucinations and/or urination problems). \par \par In regard to his hip pain, patient is a candidate for a right GTB injection. Patient has decrease range of motion and pain in the greater trochanteric bursa. Will schedule a right greater trochanteric bursa injection with fluoroscopy guidance to better visualize the joint. If ongoing relief of greater than 30% for 4 months can be repeated in 4-6 months VS PRP or PDA or Stem Cells.\par \par In regard to his knee pain, patient is a candidate for a right knee diagnostic genicular injection.   I am planning a right superior medial genicular, superior-lateral genicular and inferior medial geniculate nerve injection under fluoroscopy guidance to diagnosis knee pain, knee osteoarthritis and pain from knee replacement to decide on further treatment and possibly RFA.\par \par  Risk, benefits, pros and cons of procedure were explained to the patient using models and diagrams and their questions were answered. \par \par I, Rosalie Ralph, attest that this documentation has been prepared under the direction and in the presence of Provider Michel Vasquez, DO\par The documentation recorded by the scribe, in my presence, accurately reflects the service I personally performed, and the decisions made by me with my edits as appropriate.\par \par Best Regards, \par [email or web address] [email or web address]. \par Diplomat, American Board of Anesthesiology \par Diplomat, American Board of Pain Medicine \par Diplomat, American Board of Pain Management

## 2024-01-24 NOTE — ASSESSMENT & PLAN NOTE
Patient's (Body mass index is 29.13 kg/m².) indicates that they are overweight with health conditions that include hypertension . Weight is unchanged.  Diet and exercise are encouraged and she will follow-up with her primary care physician regarding her elevated BMI otherwise.

## 2024-01-24 NOTE — PATIENT INSTRUCTIONS
The patient is doing very well clinically.  Her flow volume loop shows a slight but not significant decline from previous and again she is doing well clinically.  I will see her back in 1 year with a flow-volume loop.

## 2024-01-24 NOTE — PROCEDURES
Spirometry    Performed by: Ashtyn Jones, RRT  Authorized by: Stephen Cope MD     Pre Drug % Predicted    FVC: 75%   FEV1: 64%   FEV1/FVC: 63%    Interpretation   Spirometry   Spirometry shows moderate obstruction.   Overall comments: The patient's spirometry is consistent with a moderate obstructive ventilatory defect.  When current studies are compared to studies from January 10, 2023, she has had a slight decline in the FVC and also a decline in her FEV1 compared to previous but would still be considered to have a moderate obstructive ventilatory defect.

## 2024-06-12 ENCOUNTER — TELEPHONE (OUTPATIENT)
Dept: NEUROLOGY | Facility: CLINIC | Age: 89
End: 2024-06-12
Payer: MEDICARE

## 2024-06-12 NOTE — TELEPHONE ENCOUNTER
Spoke with patient about rescheduling appointment 07/26/24, patient stated that is fine but would have to call back due to her driving at the time of the call

## 2024-07-08 ENCOUNTER — OFFICE VISIT (OUTPATIENT)
Dept: CARDIOLOGY CLINIC | Age: 89
End: 2024-07-08
Payer: MEDICARE

## 2024-07-08 VITALS
OXYGEN SATURATION: 98 % | BODY MASS INDEX: 26.43 KG/M2 | HEIGHT: 69 IN | SYSTOLIC BLOOD PRESSURE: 124 MMHG | HEART RATE: 58 BPM | DIASTOLIC BLOOD PRESSURE: 72 MMHG

## 2024-07-08 DIAGNOSIS — I35.1 MILD AORTIC INSUFFICIENCY: ICD-10-CM

## 2024-07-08 DIAGNOSIS — I63.9 STROKE WITH CEREBRAL ISCHEMIA (HCC): ICD-10-CM

## 2024-07-08 DIAGNOSIS — I10 ESSENTIAL HYPERTENSION: Primary | ICD-10-CM

## 2024-07-08 DIAGNOSIS — I47.10 PSVT (PAROXYSMAL SUPRAVENTRICULAR TACHYCARDIA) (HCC): ICD-10-CM

## 2024-07-08 PROCEDURE — G8419 CALC BMI OUT NRM PARAM NOF/U: HCPCS | Performed by: NURSE PRACTITIONER

## 2024-07-08 PROCEDURE — 1090F PRES/ABSN URINE INCON ASSESS: CPT | Performed by: NURSE PRACTITIONER

## 2024-07-08 PROCEDURE — 93000 ELECTROCARDIOGRAM COMPLETE: CPT | Performed by: NURSE PRACTITIONER

## 2024-07-08 PROCEDURE — 1036F TOBACCO NON-USER: CPT | Performed by: NURSE PRACTITIONER

## 2024-07-08 PROCEDURE — G8427 DOCREV CUR MEDS BY ELIG CLIN: HCPCS | Performed by: NURSE PRACTITIONER

## 2024-07-08 PROCEDURE — 1123F ACP DISCUSS/DSCN MKR DOCD: CPT | Performed by: NURSE PRACTITIONER

## 2024-07-08 PROCEDURE — 99214 OFFICE O/P EST MOD 30 MIN: CPT | Performed by: NURSE PRACTITIONER

## 2024-07-08 NOTE — PATIENT INSTRUCTIONS
pressure, or a strange feeling in the chest.  Sweating.  Shortness of breath.  Pain, pressure, or a strange feeling in the back, neck, jaw, or upper belly or in one or both shoulders or arms.  Lightheadedness or sudden weakness.  A fast or irregular heartbeat.  After you call 911, the  may tell you to chew 1 adult-strength or 2 to 4 low-dose aspirin. Wait for an ambulance. Do not try to drive yourself.  Watch closely for changes in your health, and be sure to contact your doctor if you have any problems.  Where can you learn more?  Go to [email or web address] and sign in to your [employer] account. Enter F075 in the Search Health Information box to learn more about \"A Healthy Heart: Care Instructions.\"     If you do not have an account, please click on the \"Sign Up Now\" link.  Current as of: December 16, 2019               Content Version: 12.5  © 9589-8086 [employer].   Care instructions adapted under license by [employer]. If you have questions about a medical condition or this instruction, always ask your healthcare professional. [employer] disclaims any warranty or liability for your use of this information.

## 2024-07-08 NOTE — PROGRESS NOTES
[facility]  [address], [address], [place]  [address]  [phone] office  [phone] fax      OFFICE VISIT:  2024    Nena Conley - : 3/26/1933  Reason For Visit:  Nena is a 89 y.o. female who is here for 1 Year Follow Up and Hypertension    History:   Diagnosis Orders   1. Essential hypertension  EKG 12 lead      2. PSVT (paroxysmal supraventricular tachycardia) (HCC)        3. Mild aortic insufficiency        4. Stroke with cerebral ischemia (HCC)          The patient presents today for cardiology follow up.  She is a well preserved 89 year old with the following history:  1.  Hypertension on medications, normal LV systolic function (echo 2021) with mild LVH, negative submaximal stress echo 2021.  2.  COPD with history of prior tobacco use.  3.  Reported SVT with prior CVA without residual 2019, meningioma resection .    The patient continues to live alone in her own home.  She does all the housework and her sons do the yard work.  She still enjoys family and going out for meals.  The patient denies symptoms to suggest myocardial ischemia, heart failure or arrhythmia.  BP is well controlled on current regimen at 124/72.  The patient's PCP monitors cholesterol.      Subjective  Nena denies exertional chest pain, shortness of breath, orthopnea, paroxysmal nocturnal dyspnea, syncope, presyncope, sensed arrhythmia, edema and fatigue.  The patient denies numbness or weakness to suggest cerebrovascular accident or transient ischemic attack.      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

## 2024-07-15 RX ORDER — AMLODIPINE BESYLATE 5 MG/1
TABLET ORAL
Qty: 90 TABLET | Refills: 3 | Status: SHIPPED | OUTPATIENT
Start: 2024-07-15

## 2024-08-27 DIAGNOSIS — I10 HYPERTENSION, UNSPECIFIED TYPE: Primary | ICD-10-CM

## 2024-08-27 RX ORDER — OLMESARTAN MEDOXOMIL 40 MG/1
TABLET ORAL
Qty: 90 TABLET | Refills: 1 | Status: SHIPPED | OUTPATIENT
Start: 2024-08-27

## 2025-01-24 ENCOUNTER — OFFICE VISIT (OUTPATIENT)
Dept: CARDIOLOGY CLINIC | Age: 89
End: 2025-01-24
Payer: MEDICARE

## 2025-01-24 VITALS
BODY MASS INDEX: 26.51 KG/M2 | WEIGHT: 179 LBS | HEIGHT: 69 IN | OXYGEN SATURATION: 96 % | SYSTOLIC BLOOD PRESSURE: 116 MMHG | DIASTOLIC BLOOD PRESSURE: 68 MMHG | HEART RATE: 62 BPM

## 2025-01-24 DIAGNOSIS — I10 HYPERTENSION, UNSPECIFIED TYPE: Primary | ICD-10-CM

## 2025-01-24 DIAGNOSIS — I10 ESSENTIAL HYPERTENSION: ICD-10-CM

## 2025-01-24 PROCEDURE — 1090F PRES/ABSN URINE INCON ASSESS: CPT | Performed by: INTERNAL MEDICINE

## 2025-01-24 PROCEDURE — G8419 CALC BMI OUT NRM PARAM NOF/U: HCPCS | Performed by: INTERNAL MEDICINE

## 2025-01-24 PROCEDURE — 1159F MED LIST DOCD IN RCRD: CPT | Performed by: INTERNAL MEDICINE

## 2025-01-24 PROCEDURE — 99214 OFFICE O/P EST MOD 30 MIN: CPT | Performed by: INTERNAL MEDICINE

## 2025-01-24 PROCEDURE — 1036F TOBACCO NON-USER: CPT | Performed by: INTERNAL MEDICINE

## 2025-01-24 PROCEDURE — 1123F ACP DISCUSS/DSCN MKR DOCD: CPT | Performed by: INTERNAL MEDICINE

## 2025-01-24 PROCEDURE — G8427 DOCREV CUR MEDS BY ELIG CLIN: HCPCS | Performed by: INTERNAL MEDICINE

## 2025-01-24 ASSESSMENT — ENCOUNTER SYMPTOMS
WHEEZING: 0
VOMITING: 0
DIARRHEA: 0
CONSTIPATION: 0
COUGH: 0
ABDOMINAL DISTENTION: 0
EYE DISCHARGE: 0
BACK PAIN: 0
SHORTNESS OF BREATH: 0
BLOOD IN STOOL: 0

## 2025-01-24 NOTE — PROGRESS NOTES
[facility]  Cardiology Office Note  [address], [place]  [address]  Phone: [phone]  Fax: [phone]                            Date:  1/24/2025  Patient: Nena Conley  Age:  89 y.o., 3/26/1933    Referral: No ref. provider found      PROBLEM LIST:    Patient Active Problem List    Diagnosis Date Noted    Grade I diastolic dysfunction 05/13/2020     Priority: Low    Leg fatigue 04/19/2018     Priority: Low    History of CVA (cerebrovascular accident) 04/03/2018     Priority: Low    PSVT (paroxysmal supraventricular tachycardia) ([facility]) 04/03/2018     Priority: Low    Acute cystitis without hematuria 02/11/2018     Priority: Low    COPD (chronic obstructive pulmonary disease) ([facility]) 02/11/2018     Priority: Low    Stroke with cerebral ischemia ([facility]) 02/10/2018     Priority: Low    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  Hypertension on medications, normal LV systolic function (echo 5/19/2021) with mild LVH, negative submaximal stress echo 5/17/2021.  2.  COPD with history of prior tobacco use.  3.  Reported SVT with prior CVA without residual 2019, meningioma resection 2009.    PRESENTATION: Nena Conley is a 89 y.o. year old female presents for follow-up evaluation.  She has been doing fairly well with no significant issues over the last year.  No chest pain or shortness of breath.  Still lives alone and manages on

## 2025-01-27 ENCOUNTER — OFFICE VISIT (OUTPATIENT)
Dept: PULMONOLOGY | Facility: CLINIC | Age: OVER 89
End: 2025-01-27
Payer: MEDICARE

## 2025-01-27 VITALS
DIASTOLIC BLOOD PRESSURE: 86 MMHG | SYSTOLIC BLOOD PRESSURE: 128 MMHG | BODY MASS INDEX: 29.06 KG/M2 | HEIGHT: 66 IN | HEART RATE: 57 BPM | OXYGEN SATURATION: 96 % | WEIGHT: 180.8 LBS

## 2025-01-27 DIAGNOSIS — E66.3 OVERWEIGHT: Chronic | ICD-10-CM

## 2025-01-27 DIAGNOSIS — J44.9 STAGE 2 MODERATE COPD BY GOLD CLASSIFICATION: Primary | Chronic | ICD-10-CM

## 2025-01-27 DIAGNOSIS — Z87.891 FORMER SMOKER: Chronic | ICD-10-CM

## 2025-01-27 DIAGNOSIS — G47.34 NOCTURNAL HYPOXEMIA: Chronic | ICD-10-CM

## 2025-01-27 PROCEDURE — 1160F RVW MEDS BY RX/DR IN RCRD: CPT | Performed by: INTERNAL MEDICINE

## 2025-01-27 PROCEDURE — 99214 OFFICE O/P EST MOD 30 MIN: CPT | Performed by: INTERNAL MEDICINE

## 2025-01-27 PROCEDURE — 1159F MED LIST DOCD IN RCRD: CPT | Performed by: INTERNAL MEDICINE

## 2025-01-27 NOTE — PROGRESS NOTES
Chief Complaint  COPD    Subjective    History of Present Illness {CC  Problem List  Visit Diagnosis   Encounters  Notes  Medications  Labs  Result Review Imaging  Media: 23}    Angelia Munoz presents to [facility] for COPD.    History of Present Illness   The patient states she has occasional shortness of breath but overall is doing well since last visit.  Her pulmonary function do show some decline in her spirometry although she would still be considered to have a moderate obstructive ventilatory defect.  I discussed the possible of adding maintenance therapy to her current albuterol HFA regimen and she states she is doing well just continue his current regimen.  I did advise her if she had increasing issues with shortness of breath or frequent exacerbations of her COPD that I would recommend we consider maintenance therapy.  She has had the COVID-19 vaccine in the past including a standard booster in the form of the Moderna vaccine.  She had the flu shot in October and has had a Prevnar 13 and Pneumovax in the past as well.Angelia Munoz  reports that she quit smoking about 36 years ago. Her smoking use included cigarettes. She started smoking about 46 years ago. She has a 5 pack-year smoking history. She has been exposed to tobacco smoke. She has never used smokeless tobacco.     Current Outpatient Medications:     albuterol sulfate  (90 Base) MCG/ACT inhaler, Inhale 2 puffs Every 4 (Four) Hours As Needed for Wheezing or Shortness of Air., Disp: 18 g, Rfl: 11    allopurinol (ZYLOPRIM) 100 MG tablet, Take 1 tablet by mouth Daily., Disp: , Rfl:     amLODIPine (NORVASC) 5 MG tablet, Take 1 tablet by mouth Daily., Disp: , Rfl:     aspirin 325 MG tablet, Take 1 tablet by mouth Daily., Disp: , Rfl:     hydrochlorothiazide (MICROZIDE) 12.5 MG capsule, Take 1 capsule by mouth Daily., Disp: , Rfl: 1    metoprolol tartrate (LOPRESSOR) 100 MG tablet, Take  "1 tablet by mouth 2 (Two) Times a Day., Disp: , Rfl:     nebivolol (BYSTOLIC) 10 MG tablet, Take 0.5 tablets by mouth 2 (Two) Times a Day., Disp: , Rfl:     O2 (OXYGEN), Inhale 2 L/min Every Night., Disp: , Rfl:     olmesartan (BENICAR) 40 MG tablet, Take 1 tablet by mouth Daily., Disp: , Rfl: 4    telmisartan (Micardis) 80 MG tablet, Take 1 tablet by mouth Daily. (Patient not taking: Reported on 2025), Disp: , Rfl:     Social History     Socioeconomic History    Marital status:    Tobacco Use    Smoking status: Former     Current packs/day: 0.00     Average packs/day: 0.5 packs/day for 10.0 years (5.0 ttl pk-yrs)     Types: Cigarettes     Start date:      Quit date:      Years since quittin.0     Passive exposure: Past    Smokeless tobacco: Never    Tobacco comments:     quit 40 years ago   Vaping Use    Vaping status: Never Used   Substance and Sexual Activity    Alcohol use: Yes     Comment: occas    Drug use: No    Sexual activity: Defer       Objective   Vital Signs:   /86   Pulse 57   Ht 167.6 cm (65.98\")   Wt 82 kg (180 lb 12.8 oz)   SpO2 96% Comment: RA  BMI 29.20 kg/m²     Physical Exam  Vitals and nursing note reviewed.   Constitutional:       Comments: She is mildly bradycardic with a pulse of 57.  She is on beta-blocker therapy.  Her BMI is elevated.   HENT:      Head: Normocephalic.   Eyes:      Extraocular Movements: Extraocular movements intact.      Pupils: Pupils are equal, round, and reactive to light.   Cardiovascular:      Rate and Rhythm: Regular rhythm. Bradycardia present.      Comments: Again she was mildly bradycardic with a pulse of 57.  Pulmonary:      Effort: Pulmonary effort is normal.      Comments: Lung fields are clear with reasonable air movement bilaterally and no adventitious sounds are heard.  Musculoskeletal:         General: Normal range of motion.   Skin:     General: Skin is warm and dry.   Neurological:      General: No focal deficit " present.      Mental Status: She is alert and oriented to person, place, and time.   Psychiatric:         Mood and Affect: Mood normal.         Behavior: Behavior normal.        Result Review :    PFT Values          1/24/2024    14:15 1/27/2025    13:00   Pre Drug PFT Results   FVC 75 63   FEV1 64 56   FEV1/FVC 63 66         Results for orders placed in visit on 01/27/25    Spirometry    Narrative  Spirometry    Performed by: Ashtyn Jones RRT  Authorized by: Stephen Cope MD  Pre Drug % Predicted  FVC: 63%  FEV1: 56%  FEV1/FVC: 66%    Interpretation  Spirometry  Spirometry shows moderate obstruction.  Overall comments: The patient's spirometry is consistent with a moderate obstructive ventilatory defect.  When current studies are compared to studies from January 24, 2024, there has been a decline in both her FVC and FEV1 compared to previous.      Results for orders placed in visit on 01/24/24    Spirometry    Narrative  Spirometry    Performed by: Ashtyn Jones RRT  Authorized by: Stephen Cope MD  Pre Drug % Predicted  FVC: 75%  FEV1: 64%  FEV1/FVC: 63%    Interpretation  Spirometry  Spirometry shows moderate obstruction.  Overall comments: The patient's spirometry is consistent with a moderate obstructive ventilatory defect.  When current studies are compared to studies from January 10, 2023, she has had a slight decline in the FVC and also a decline in her FEV1 compared to previous but would still be considered to have a moderate obstructive ventilatory defect.      Results for orders placed in visit on 01/18/23    Spirometry Without Bronchodilator    Narrative  Spirometry Without Bronchodilator  Performed by: Ashtyn Jones RRT  Authorized by: Stephen Cope MD    Pre Drug % Predicted  FVC: 76%  FEV1: 70%  FEF 25-75%: 60%  FEV1/FVC: 69%    Interpretation  Spirometry  Spirometry shows moderate obstruction.  Overall comments: The patient's spirometry is  consistent with a moderate obstructive ventilatory defect.  When current studies compared to studies performed on January 19, 2022, there has been a slight drop in her FVC and slight improvement in her FEV1 compared to previous.                 My interpretation of imaging:    XR CHEST 1 VW (02/10/2018 21:42 EST)         Assessment and Plan {CC Problem List  Visit Diagnosis  ROS  Review (Popup)  Health Maintenance  Quality  BestPractice  Medications  SmartSets  SnapShot Encounters  Media : 23}    Diagnoses and all orders for this visit:    1. Stage 2 moderate COPD by GOLD classification (Primary)  Assessment & Plan:  Her PFTs show some decline from previous but she would still be considered to have stage II COPD.  She will continue her albuterol HFA as needed.    Orders:  -     Spirometry  -     Spirometry; Future    2. Nocturnal hypoxemia  Assessment & Plan:  She will continue her nocturnal oxygen therapy.      3. Former smoker  Assessment & Plan:  Angelia Munoz  reports that she quit smoking about 36 years ago. Her smoking use included cigarettes. She started smoking about 46 years ago. She has a 5 pack-year smoking history. She has been exposed to tobacco smoke. She has never used smokeless tobacco.           4. Overweight  Assessment & Plan:  Patient's (Body mass index is 29.2 kg/m².) indicates that they are overweight with health conditions that include hypertension . Weight is unchanged.  Diet and exercise are encouraged and she will follow-up with her primary care physician regarding her elevated BMI otherwise.            Stephen Cope MD  1/27/2025  13:18 CST    Follow Up   Return in about 1 year (around 1/27/2026) for FVL.    Patient was given instructions and counseling regarding her condition or for health maintenance advice. Please see specific information pulled into the AVS if appropriate.

## 2025-01-27 NOTE — ASSESSMENT & PLAN NOTE
Patient's (Body mass index is 29.2 kg/m².) indicates that they are overweight with health conditions that include hypertension . Weight is unchanged.  Diet and exercise are encouraged and she will follow-up with her primary care physician regarding her elevated BMI otherwise.

## 2025-01-27 NOTE — ASSESSMENT & PLAN NOTE
Her PFTs show some decline from previous but she would still be considered to have stage II COPD.  She will continue her albuterol HFA as needed.

## 2025-01-27 NOTE — ASSESSMENT & PLAN NOTE
Angelia Munoz  reports that she quit smoking about 36 years ago. Her smoking use included cigarettes. She started smoking about 46 years ago. She has a 5 pack-year smoking history. She has been exposed to tobacco smoke. She has never used smokeless tobacco.

## 2025-01-27 NOTE — PATIENT INSTRUCTIONS
The patient's pulmonary function show slight decline from previous but she is doing well clinically.  She occasionally has some issues with shortness of breath but she states her albuterol inhaler is doing well for her.  If she had increasing symptoms in the future we can add maintenance therapy but for now she will continue her current regimen and I will see her back in 1 year with a flow-volume loop.

## 2025-02-26 DIAGNOSIS — I10 HYPERTENSION, UNSPECIFIED TYPE: ICD-10-CM

## 2025-02-26 RX ORDER — OLMESARTAN MEDOXOMIL 40 MG/1
TABLET ORAL
Qty: 90 TABLET | Refills: 1 | Status: SHIPPED | OUTPATIENT
Start: 2025-02-26

## 2025-07-28 ENCOUNTER — OFFICE VISIT (OUTPATIENT)
Dept: CARDIOLOGY CLINIC | Age: 89
End: 2025-07-28
Payer: MEDICARE

## 2025-07-28 VITALS
HEART RATE: 56 BPM | WEIGHT: 179 LBS | DIASTOLIC BLOOD PRESSURE: 84 MMHG | HEIGHT: 69 IN | BODY MASS INDEX: 26.51 KG/M2 | SYSTOLIC BLOOD PRESSURE: 128 MMHG

## 2025-07-28 DIAGNOSIS — I10 ESSENTIAL HYPERTENSION: Primary | ICD-10-CM

## 2025-07-28 DIAGNOSIS — I35.1 MILD AORTIC INSUFFICIENCY: ICD-10-CM

## 2025-07-28 DIAGNOSIS — I47.10 PSVT (PAROXYSMAL SUPRAVENTRICULAR TACHYCARDIA): ICD-10-CM

## 2025-07-28 PROCEDURE — 1036F TOBACCO NON-USER: CPT | Performed by: NURSE PRACTITIONER

## 2025-07-28 PROCEDURE — 93000 ELECTROCARDIOGRAM COMPLETE: CPT | Performed by: NURSE PRACTITIONER

## 2025-07-28 PROCEDURE — G8427 DOCREV CUR MEDS BY ELIG CLIN: HCPCS | Performed by: NURSE PRACTITIONER

## 2025-07-28 PROCEDURE — G8419 CALC BMI OUT NRM PARAM NOF/U: HCPCS | Performed by: NURSE PRACTITIONER

## 2025-07-28 PROCEDURE — 1160F RVW MEDS BY RX/DR IN RCRD: CPT | Performed by: NURSE PRACTITIONER

## 2025-07-28 PROCEDURE — 1090F PRES/ABSN URINE INCON ASSESS: CPT | Performed by: NURSE PRACTITIONER

## 2025-07-28 PROCEDURE — 1159F MED LIST DOCD IN RCRD: CPT | Performed by: NURSE PRACTITIONER

## 2025-07-28 PROCEDURE — 1123F ACP DISCUSS/DSCN MKR DOCD: CPT | Performed by: NURSE PRACTITIONER

## 2025-07-28 PROCEDURE — 99213 OFFICE O/P EST LOW 20 MIN: CPT | Performed by: NURSE PRACTITIONER

## 2025-08-29 DIAGNOSIS — I10 HYPERTENSION, UNSPECIFIED TYPE: ICD-10-CM

## 2025-08-29 RX ORDER — OLMESARTAN MEDOXOMIL 40 MG/1
40 TABLET ORAL DAILY
Qty: 90 TABLET | Refills: 1 | Status: SHIPPED | OUTPATIENT
Start: 2025-08-29

## (undated) DEVICE — GLV SURG BIOGEL M LTX PF 7 1/2

## (undated) DEVICE — Device: Brand: IQ SYSTEM

## (undated) DEVICE — SPONGE,NEURO,0.5"X3",XR,STRL,LF,10/PK: Brand: MEDLINE

## (undated) DEVICE — SUT ETHLN 6/0 P1 18IN 697G

## (undated) DEVICE — DISPOSABLE BIPOLAR CABLE 12FT. (3.6M): Brand: KIRWAN

## (undated) DEVICE — SOL IRR BSS 15ML STRL

## (undated) DEVICE — SUT PROLN 6/0 P1 18IN 8697G

## (undated) DEVICE — SUT MNCRYL 4/0 P3 18IN UD MCP494G

## (undated) DEVICE — PK ENT HD AND NK 30

## (undated) DEVICE — TOOTHBRSH XSFT STD A/ WHT

## (undated) DEVICE — ELECTRD NDL EDGE/INSUL/PFTE.787MM 2.84IN

## (undated) DEVICE — PK TURNOVER RM ADV

## (undated) DEVICE — SPNG GZ WOVN 4X4IN 12PLY 10/BX STRL

## (undated) DEVICE — SKIN AFFIX SURG ADHESIVE 72/CS 0.55ML: Brand: MEDLINE

## (undated) DEVICE — STERILE WIRE CUTTER (WCS144): Brand: CENTURION